# Patient Record
Sex: MALE | Race: WHITE | NOT HISPANIC OR LATINO | Employment: FULL TIME | URBAN - METROPOLITAN AREA
[De-identification: names, ages, dates, MRNs, and addresses within clinical notes are randomized per-mention and may not be internally consistent; named-entity substitution may affect disease eponyms.]

---

## 2019-11-11 PROCEDURE — 99285 EMERGENCY DEPT VISIT HI MDM: CPT

## 2019-11-12 ENCOUNTER — HOSPITAL ENCOUNTER (INPATIENT)
Facility: CLINIC | Age: 39
LOS: 1 days | Discharge: HOME | DRG: 390 | End: 2019-11-13
Attending: EMERGENCY MEDICINE | Admitting: INTERNAL MEDICINE
Payer: COMMERCIAL

## 2019-11-12 ENCOUNTER — APPOINTMENT (OUTPATIENT)
Dept: CT IMAGING | Facility: CLINIC | Age: 39
DRG: 390 | End: 2019-11-12
Attending: EMERGENCY MEDICINE
Payer: COMMERCIAL

## 2019-11-12 DIAGNOSIS — K56.609 SMALL BOWEL OBSTRUCTION (H): ICD-10-CM

## 2019-11-12 LAB
ALBUMIN SERPL-MCNC: 4.1 G/DL (ref 3.4–5)
ALP SERPL-CCNC: 60 U/L (ref 40–150)
ALT SERPL W P-5'-P-CCNC: 63 U/L (ref 0–70)
ANION GAP SERPL CALCULATED.3IONS-SCNC: 2 MMOL/L (ref 3–14)
AST SERPL W P-5'-P-CCNC: 29 U/L (ref 0–45)
BASOPHILS # BLD AUTO: 0.1 10E9/L (ref 0–0.2)
BASOPHILS NFR BLD AUTO: 0.6 %
BILIRUB SERPL-MCNC: 0.4 MG/DL (ref 0.2–1.3)
BUN SERPL-MCNC: 13 MG/DL (ref 7–30)
CALCIUM SERPL-MCNC: 9 MG/DL (ref 8.5–10.1)
CHLORIDE SERPL-SCNC: 105 MMOL/L (ref 94–109)
CO2 SERPL-SCNC: 31 MMOL/L (ref 20–32)
CREAT SERPL-MCNC: 1 MG/DL (ref 0.66–1.25)
DIFFERENTIAL METHOD BLD: ABNORMAL
EOSINOPHIL # BLD AUTO: 0.4 10E9/L (ref 0–0.7)
EOSINOPHIL NFR BLD AUTO: 3.3 %
ERYTHROCYTE [DISTWIDTH] IN BLOOD BY AUTOMATED COUNT: 13.3 % (ref 10–15)
GFR SERPL CREATININE-BSD FRML MDRD: >90 ML/MIN/{1.73_M2}
GLUCOSE SERPL-MCNC: 115 MG/DL (ref 70–99)
HCT VFR BLD AUTO: 43.2 % (ref 40–53)
HGB BLD-MCNC: 15.4 G/DL (ref 13.3–17.7)
IMM GRANULOCYTES # BLD: 0 10E9/L (ref 0–0.4)
IMM GRANULOCYTES NFR BLD: 0.2 %
LIPASE SERPL-CCNC: 135 U/L (ref 73–393)
LYMPHOCYTES # BLD AUTO: 3.2 10E9/L (ref 0.8–5.3)
LYMPHOCYTES NFR BLD AUTO: 26.5 %
MCH RBC QN AUTO: 29.4 PG (ref 26.5–33)
MCHC RBC AUTO-ENTMCNC: 35.6 G/DL (ref 31.5–36.5)
MCV RBC AUTO: 83 FL (ref 78–100)
MONOCYTES # BLD AUTO: 0.7 10E9/L (ref 0–1.3)
MONOCYTES NFR BLD AUTO: 6 %
NEUTROPHILS # BLD AUTO: 7.7 10E9/L (ref 1.6–8.3)
NEUTROPHILS NFR BLD AUTO: 63.4 %
NRBC # BLD AUTO: 0 10*3/UL
NRBC BLD AUTO-RTO: 0 /100
PLATELET # BLD AUTO: 234 10E9/L (ref 150–450)
POTASSIUM SERPL-SCNC: 3.7 MMOL/L (ref 3.4–5.3)
PROT SERPL-MCNC: 7.5 G/DL (ref 6.8–8.8)
RBC # BLD AUTO: 5.23 10E12/L (ref 4.4–5.9)
SODIUM SERPL-SCNC: 138 MMOL/L (ref 133–144)
WBC # BLD AUTO: 12.2 10E9/L (ref 4–11)

## 2019-11-12 PROCEDURE — 25000132 ZZH RX MED GY IP 250 OP 250 PS 637: Performed by: INTERNAL MEDICINE

## 2019-11-12 PROCEDURE — 83690 ASSAY OF LIPASE: CPT | Performed by: EMERGENCY MEDICINE

## 2019-11-12 PROCEDURE — 12000000 ZZH R&B MED SURG/OB

## 2019-11-12 PROCEDURE — 99207 ZZC CDG-MDM COMPONENT: MEETS MODERATE - UP CODED: CPT | Performed by: INTERNAL MEDICINE

## 2019-11-12 PROCEDURE — 25800030 ZZH RX IP 258 OP 636: Performed by: EMERGENCY MEDICINE

## 2019-11-12 PROCEDURE — 80053 COMPREHEN METABOLIC PANEL: CPT | Performed by: EMERGENCY MEDICINE

## 2019-11-12 PROCEDURE — 25000125 ZZHC RX 250: Performed by: EMERGENCY MEDICINE

## 2019-11-12 PROCEDURE — 96376 TX/PRO/DX INJ SAME DRUG ADON: CPT

## 2019-11-12 PROCEDURE — 99223 1ST HOSP IP/OBS HIGH 75: CPT | Mod: AI | Performed by: INTERNAL MEDICINE

## 2019-11-12 PROCEDURE — 25000128 H RX IP 250 OP 636: Performed by: EMERGENCY MEDICINE

## 2019-11-12 PROCEDURE — 25800025 ZZH RX 258: Performed by: INTERNAL MEDICINE

## 2019-11-12 PROCEDURE — 96375 TX/PRO/DX INJ NEW DRUG ADDON: CPT

## 2019-11-12 PROCEDURE — 96374 THER/PROPH/DIAG INJ IV PUSH: CPT | Mod: 59

## 2019-11-12 PROCEDURE — 25000128 H RX IP 250 OP 636: Performed by: INTERNAL MEDICINE

## 2019-11-12 PROCEDURE — 96361 HYDRATE IV INFUSION ADD-ON: CPT

## 2019-11-12 PROCEDURE — 74177 CT ABD & PELVIS W/CONTRAST: CPT

## 2019-11-12 PROCEDURE — 99204 OFFICE O/P NEW MOD 45 MIN: CPT | Performed by: SURGERY

## 2019-11-12 PROCEDURE — 85025 COMPLETE CBC W/AUTO DIFF WBC: CPT | Performed by: EMERGENCY MEDICINE

## 2019-11-12 RX ORDER — DIPHENHYDRAMINE HYDROCHLORIDE 50 MG/ML
25 INJECTION INTRAMUSCULAR; INTRAVENOUS EVERY 6 HOURS PRN
Status: DISCONTINUED | OUTPATIENT
Start: 2019-11-12 | End: 2019-11-13 | Stop reason: HOSPADM

## 2019-11-12 RX ORDER — DIPHENHYDRAMINE HYDROCHLORIDE 50 MG/ML
25 INJECTION INTRAMUSCULAR; INTRAVENOUS ONCE
Status: COMPLETED | OUTPATIENT
Start: 2019-11-12 | End: 2019-11-12

## 2019-11-12 RX ORDER — POTASSIUM CHLORIDE 1.5 G/1.58G
20-40 POWDER, FOR SOLUTION ORAL
Status: DISCONTINUED | OUTPATIENT
Start: 2019-11-12 | End: 2019-11-13 | Stop reason: HOSPADM

## 2019-11-12 RX ORDER — PROCHLORPERAZINE MALEATE 5 MG
10 TABLET ORAL EVERY 6 HOURS PRN
Status: DISCONTINUED | OUTPATIENT
Start: 2019-11-12 | End: 2019-11-13 | Stop reason: HOSPADM

## 2019-11-12 RX ORDER — ONDANSETRON 2 MG/ML
4 INJECTION INTRAMUSCULAR; INTRAVENOUS ONCE
Status: COMPLETED | OUTPATIENT
Start: 2019-11-12 | End: 2019-11-12

## 2019-11-12 RX ORDER — ACETAMINOPHEN 325 MG/1
650 TABLET ORAL EVERY 4 HOURS PRN
Status: DISCONTINUED | OUTPATIENT
Start: 2019-11-12 | End: 2019-11-13 | Stop reason: HOSPADM

## 2019-11-12 RX ORDER — DEXTROSE MONOHYDRATE, SODIUM CHLORIDE, AND POTASSIUM CHLORIDE 50; 1.49; 9 G/1000ML; G/1000ML; G/1000ML
INJECTION, SOLUTION INTRAVENOUS CONTINUOUS
Status: DISCONTINUED | OUTPATIENT
Start: 2019-11-12 | End: 2019-11-13 | Stop reason: HOSPADM

## 2019-11-12 RX ORDER — PROCHLORPERAZINE 25 MG
25 SUPPOSITORY, RECTAL RECTAL EVERY 12 HOURS PRN
Status: DISCONTINUED | OUTPATIENT
Start: 2019-11-12 | End: 2019-11-13 | Stop reason: HOSPADM

## 2019-11-12 RX ORDER — NALOXONE HYDROCHLORIDE 0.4 MG/ML
.1-.4 INJECTION, SOLUTION INTRAMUSCULAR; INTRAVENOUS; SUBCUTANEOUS
Status: DISCONTINUED | OUTPATIENT
Start: 2019-11-12 | End: 2019-11-13 | Stop reason: HOSPADM

## 2019-11-12 RX ORDER — IOPAMIDOL 755 MG/ML
121 INJECTION, SOLUTION INTRAVASCULAR ONCE
Status: COMPLETED | OUTPATIENT
Start: 2019-11-12 | End: 2019-11-12

## 2019-11-12 RX ORDER — HYDROMORPHONE HYDROCHLORIDE 1 MG/ML
0.5 INJECTION, SOLUTION INTRAMUSCULAR; INTRAVENOUS; SUBCUTANEOUS ONCE
Status: COMPLETED | OUTPATIENT
Start: 2019-11-12 | End: 2019-11-12

## 2019-11-12 RX ORDER — MAGNESIUM SULFATE HEPTAHYDRATE 40 MG/ML
4 INJECTION, SOLUTION INTRAVENOUS EVERY 4 HOURS PRN
Status: DISCONTINUED | OUTPATIENT
Start: 2019-11-12 | End: 2019-11-13 | Stop reason: HOSPADM

## 2019-11-12 RX ORDER — SODIUM CHLORIDE 9 MG/ML
1000 INJECTION, SOLUTION INTRAVENOUS CONTINUOUS
Status: DISCONTINUED | OUTPATIENT
Start: 2019-11-12 | End: 2019-11-12

## 2019-11-12 RX ORDER — HYDROXYZINE HYDROCHLORIDE 25 MG/1
25 TABLET, FILM COATED ORAL 3 TIMES DAILY PRN
Status: DISCONTINUED | OUTPATIENT
Start: 2019-11-12 | End: 2019-11-13 | Stop reason: HOSPADM

## 2019-11-12 RX ORDER — AZELASTINE 1 MG/ML
1-2 SPRAY, METERED NASAL 2 TIMES DAILY PRN
COMMUNITY

## 2019-11-12 RX ORDER — HYDROMORPHONE HYDROCHLORIDE 1 MG/ML
.3-.5 INJECTION, SOLUTION INTRAMUSCULAR; INTRAVENOUS; SUBCUTANEOUS
Status: DISCONTINUED | OUTPATIENT
Start: 2019-11-12 | End: 2019-11-13 | Stop reason: HOSPADM

## 2019-11-12 RX ORDER — HYDROMORPHONE HYDROCHLORIDE 1 MG/ML
1 INJECTION, SOLUTION INTRAMUSCULAR; INTRAVENOUS; SUBCUTANEOUS ONCE
Status: COMPLETED | OUTPATIENT
Start: 2019-11-12 | End: 2019-11-12

## 2019-11-12 RX ORDER — ONDANSETRON 4 MG/1
4 TABLET, ORALLY DISINTEGRATING ORAL EVERY 6 HOURS PRN
Status: DISCONTINUED | OUTPATIENT
Start: 2019-11-12 | End: 2019-11-13 | Stop reason: HOSPADM

## 2019-11-12 RX ORDER — ESZOPICLONE 3 MG/1
3 TABLET, FILM COATED ORAL AT BEDTIME
COMMUNITY
End: 2020-02-11

## 2019-11-12 RX ORDER — DIPHENHYDRAMINE HCL 25 MG
25 CAPSULE ORAL EVERY 6 HOURS PRN
Status: DISCONTINUED | OUTPATIENT
Start: 2019-11-12 | End: 2019-11-13 | Stop reason: HOSPADM

## 2019-11-12 RX ORDER — AMOXICILLIN 250 MG
1 CAPSULE ORAL 2 TIMES DAILY
Status: DISCONTINUED | OUTPATIENT
Start: 2019-11-12 | End: 2019-11-13 | Stop reason: HOSPADM

## 2019-11-12 RX ORDER — ALBUTEROL SULFATE 90 UG/1
2 AEROSOL, METERED RESPIRATORY (INHALATION) EVERY 6 HOURS PRN
COMMUNITY

## 2019-11-12 RX ORDER — ONDANSETRON 2 MG/ML
4 INJECTION INTRAMUSCULAR; INTRAVENOUS EVERY 6 HOURS PRN
Status: DISCONTINUED | OUTPATIENT
Start: 2019-11-12 | End: 2019-11-13 | Stop reason: HOSPADM

## 2019-11-12 RX ORDER — POTASSIUM CHLORIDE 29.8 MG/ML
20 INJECTION INTRAVENOUS
Status: DISCONTINUED | OUTPATIENT
Start: 2019-11-12 | End: 2019-11-13 | Stop reason: HOSPADM

## 2019-11-12 RX ORDER — POTASSIUM CHLORIDE 7.45 MG/ML
10 INJECTION INTRAVENOUS
Status: DISCONTINUED | OUTPATIENT
Start: 2019-11-12 | End: 2019-11-13 | Stop reason: HOSPADM

## 2019-11-12 RX ORDER — ESZOPICLONE 3 MG/1
3 TABLET, FILM COATED ORAL
Status: DISCONTINUED | OUTPATIENT
Start: 2019-11-12 | End: 2019-11-13 | Stop reason: HOSPADM

## 2019-11-12 RX ORDER — ROPINIROLE 1 MG/1
1 TABLET, FILM COATED ORAL AT BEDTIME
COMMUNITY

## 2019-11-12 RX ORDER — POTASSIUM CHLORIDE 1500 MG/1
20-40 TABLET, EXTENDED RELEASE ORAL
Status: DISCONTINUED | OUTPATIENT
Start: 2019-11-12 | End: 2019-11-13 | Stop reason: HOSPADM

## 2019-11-12 RX ORDER — POTASSIUM CL/LIDO/0.9 % NACL 10MEQ/0.1L
10 INTRAVENOUS SOLUTION, PIGGYBACK (ML) INTRAVENOUS
Status: DISCONTINUED | OUTPATIENT
Start: 2019-11-12 | End: 2019-11-13 | Stop reason: HOSPADM

## 2019-11-12 RX ORDER — LIDOCAINE 40 MG/G
CREAM TOPICAL
Status: DISCONTINUED | OUTPATIENT
Start: 2019-11-12 | End: 2019-11-13 | Stop reason: HOSPADM

## 2019-11-12 RX ADMIN — HYDROMORPHONE HYDROCHLORIDE 1 MG: 1 INJECTION, SOLUTION INTRAMUSCULAR; INTRAVENOUS; SUBCUTANEOUS at 00:21

## 2019-11-12 RX ADMIN — POTASSIUM CHLORIDE, DEXTROSE MONOHYDRATE AND SODIUM CHLORIDE: 150; 5; 900 INJECTION, SOLUTION INTRAVENOUS at 14:00

## 2019-11-12 RX ADMIN — SENNOSIDES AND DOCUSATE SODIUM 1 TABLET: 8.6; 5 TABLET ORAL at 20:59

## 2019-11-12 RX ADMIN — SENNOSIDES AND DOCUSATE SODIUM 1 TABLET: 8.6; 5 TABLET ORAL at 11:42

## 2019-11-12 RX ADMIN — ONDANSETRON 4 MG: 2 INJECTION INTRAMUSCULAR; INTRAVENOUS at 00:23

## 2019-11-12 RX ADMIN — HYDROMORPHONE HYDROCHLORIDE 0.5 MG: 1 INJECTION, SOLUTION INTRAMUSCULAR; INTRAVENOUS; SUBCUTANEOUS at 10:46

## 2019-11-12 RX ADMIN — SODIUM CHLORIDE 77 ML: 9 INJECTION, SOLUTION INTRAVENOUS at 01:23

## 2019-11-12 RX ADMIN — IOPAMIDOL 121 ML: 755 INJECTION, SOLUTION INTRAVENOUS at 01:23

## 2019-11-12 RX ADMIN — ONDANSETRON 4 MG: 2 INJECTION INTRAMUSCULAR; INTRAVENOUS at 10:52

## 2019-11-12 RX ADMIN — HYDROMORPHONE HYDROCHLORIDE 0.5 MG: 1 INJECTION, SOLUTION INTRAMUSCULAR; INTRAVENOUS; SUBCUTANEOUS at 02:30

## 2019-11-12 RX ADMIN — PROCHLORPERAZINE EDISYLATE 10 MG: 5 INJECTION INTRAMUSCULAR; INTRAVENOUS at 08:10

## 2019-11-12 RX ADMIN — DIPHENHYDRAMINE HYDROCHLORIDE 25 MG: 50 INJECTION, SOLUTION INTRAMUSCULAR; INTRAVENOUS at 01:12

## 2019-11-12 RX ADMIN — HYDROMORPHONE HYDROCHLORIDE 0.5 MG: 1 INJECTION, SOLUTION INTRAMUSCULAR; INTRAVENOUS; SUBCUTANEOUS at 04:35

## 2019-11-12 RX ADMIN — Medication: at 08:02

## 2019-11-12 RX ADMIN — HYDROMORPHONE HYDROCHLORIDE 0.5 MG: 1 INJECTION, SOLUTION INTRAMUSCULAR; INTRAVENOUS; SUBCUTANEOUS at 06:34

## 2019-11-12 RX ADMIN — DIPHENHYDRAMINE HYDROCHLORIDE 25 MG: 50 INJECTION, SOLUTION INTRAMUSCULAR; INTRAVENOUS at 02:31

## 2019-11-12 RX ADMIN — HYDROMORPHONE HYDROCHLORIDE 0.5 MG: 1 INJECTION, SOLUTION INTRAMUSCULAR; INTRAVENOUS; SUBCUTANEOUS at 08:35

## 2019-11-12 RX ADMIN — SODIUM CHLORIDE 1000 ML: 9 INJECTION, SOLUTION INTRAVENOUS at 00:21

## 2019-11-12 RX ADMIN — POTASSIUM CHLORIDE, DEXTROSE MONOHYDRATE AND SODIUM CHLORIDE: 150; 5; 900 INJECTION, SOLUTION INTRAVENOUS at 22:07

## 2019-11-12 RX ADMIN — ONDANSETRON 4 MG: 2 INJECTION INTRAMUSCULAR; INTRAVENOUS at 04:34

## 2019-11-12 RX ADMIN — POTASSIUM CHLORIDE, DEXTROSE MONOHYDRATE AND SODIUM CHLORIDE: 150; 5; 900 INJECTION, SOLUTION INTRAVENOUS at 04:43

## 2019-11-12 RX ADMIN — SODIUM CHLORIDE 1000 ML: 9 INJECTION, SOLUTION INTRAVENOUS at 01:14

## 2019-11-12 ASSESSMENT — ACTIVITIES OF DAILY LIVING (ADL)
RETIRED_COMMUNICATION: 0-->UNDERSTANDS/COMMUNICATES WITHOUT DIFFICULTY
TOILETING: 0-->INDEPENDENT
ADLS_ACUITY_SCORE: 12
COGNITION: 0 - NO COGNITION ISSUES REPORTED
FALL_HISTORY_WITHIN_LAST_SIX_MONTHS: NO
ADLS_ACUITY_SCORE: 12
ADLS_ACUITY_SCORE: 12
TRANSFERRING: 0-->INDEPENDENT
ADLS_ACUITY_SCORE: 12
DRESS: 0-->INDEPENDENT
RETIRED_EATING: 0-->INDEPENDENT
BATHING: 0-->INDEPENDENT
AMBULATION: 0-->INDEPENDENT
SWALLOWING: 0-->SWALLOWS FOODS/LIQUIDS WITHOUT DIFFICULTY
ADLS_ACUITY_SCORE: 13

## 2019-11-12 ASSESSMENT — ENCOUNTER SYMPTOMS: ABDOMINAL PAIN: 1

## 2019-11-12 ASSESSMENT — MIFFLIN-ST. JEOR: SCORE: 2040.76

## 2019-11-12 NOTE — PROGRESS NOTES
Admission    Patient arrives to room 635-1 via cart from ED.  Care plan note: Done    Inpatient nursing criteria listed below were met:    PCD's Documented: Yes  Skin issues/needs documented :Yes  Isolation education started/completed NA  Patient allergies verified with patient: Yes  Verified completion of [place] Risk Assessment Tool:  Yes  Verified completion of Guardianship screening tool: Yes  Fall Prevention: Care plan updated, Education given and documented Yes  Care Plan initiated: Yes  Home medications documented in belongings flowsheet: Yes  Patient belongings documented in belongings flowsheet: Yes  Reminder note (belongings/ medications) placed in discharge instructions:Yes  Admission profile/ required documentation complete: Yes  Bedside Report Letter given and explained to patient Yes

## 2019-11-12 NOTE — PROGRESS NOTES
RECEIVING UNIT ED HANDOFF REVIEW    ED Nurse Handoff Report was reviewed by: Kathryn Cobb on November 12, 2019 at 2:56 AM

## 2019-11-12 NOTE — CONSULTS
General Surgery Consultation    Cash Cunningham MRN#: [id number]   Age: 38 year old YOB: 1980     The surgical service was consulted by Dr. Ferrari to evaluate and/or treat this patient for SBO.    Date of Admission:          11/12/2019       Chief Complaint:     Chief Complaint   Patient presents with     Abdominal Pain          History of Present Illness:   This patient is a 38 year old male who presented to the [facility] ER with lower abdominal pain and bloating after dinner yesterday.  He denies fever, chills, nausea, vomiting, change in BM or urination.  He has had a  hx of SBO since his surgery, last one about 2 years ago.  The patient's abdominal surgeries include SB resection for Meckels.  The history is obtained from the patient and chart. The patient is NPO.     Of note, patient has a rectal fistula, awaiting surgery in January.         Past Medical History:   Depression/Anxiety  ADHD  Planta Fascitis  Hx of renal calculi  Atypical nevus  Dysfxn of pelvic floor  Hx of SBO  Restless leg syndrome  Rectal fistula         Past Surgical History:   Tonsillectomy   Arthroscopic knee operation   SB resection due to Meckels 2010 at [facility]  Liposuction         Medications:     Prior to Admission medications    Medication Sig Start Date End Date Taking? Authorizing Provider   albuterol (PROAIR HFA/PROVENTIL HFA/VENTOLIN HFA) 108 (90 Base) MCG/ACT inhaler Inhale 2 puffs into the lungs every 6 hours as needed for shortness of breath / dyspnea or wheezing   Yes Unknown, Entered By History   azelastine (ASTELIN) 0.1 % nasal spray Spray 1-2 sprays into both nostrils 2 times daily   Yes Unknown, Entered By History   eszopiclone (LUNESTA) 3 MG tablet Take 3 mg by mouth At Bedtime   Yes Unknown, Entered By History   ketotifen (ZADITOR/REFRESH ANTI-ITCH) 0.025 % ophthalmic solution Place 1 drop into both eyes 2 times daily   Yes Unknown, Entered By History   rOPINIRole (REQUIP) 1 MG tablet Take 1 mg  "by mouth At Bedtime   Yes Unknown, Entered By History          Current Medications:           senna-docusate  1 tablet Oral BID     sodium chloride (PF)  3 mL Intracatheter Q8H     acetaminophen, diphenhydrAMINE **OR** diphenhydrAMINE, eucerin, HYDROmorphone, hydrOXYzine, lidocaine 4%, lidocaine (buffered or not buffered), magnesium sulfate, melatonin, naloxone, ondansetron **OR** ondansetron, potassium chloride, potassium chloride with lidocaine, potassium chloride, potassium chloride, potassium chloride, prochlorperazine **OR** prochlorperazine **OR** prochlorperazine, sodium chloride (PF)       Allergies:     Allergies   Allergen Reactions     Cat Hair Extract Itching     eye lids may swell     Fructose GI Disturbance     Lactose GI Disturbance     Stomach cramps; bloating; and gas     Morphine Itching     Trazodone Other (See Comments)     Muscle spasms          Social History:     Social History     Tobacco Use     Smoking status: Not on file   Substance Use Topics     Alcohol use: Not on file          Family History:   Hyperlipidemia   Hypertension   Melanoma   Basal cell carcinoma   Squamous cell carcinoma   Stroke          Review of Systems:   All other systems negative other than noted in the HPI.         Physical Exam:   Blood pressure 131/74, pulse 59, temperature 98  F (36.7  C), temperature source Oral, resp. rate 18, height 1.803 m (5' 11\"), weight 108.9 kg (240 lb), SpO2 97 %.  No intake/output data recorded.  General - This is a well developed, well nourished male in no apparent distress.  HEENT - Normocephalic. Atraumatic. Moist mucous membranes. Pupils equal.  No scleral icterus.  Neck - Supple without masses or lymphadenopathy.  Lungs - Clear to ascultation bilaterally without crackles or wheezing.    Heart - Regular rate & rhythm without murmur.  Abdomen - Soft, mild ttp mid abdomen, non-distended, -bowel sounds, no masses or organomegaly.  Extremities - Moves all extremities. No " edema.  Neurologic - Nonfocal.  Skin - Warm and dry.          Data:   Labs:  Recent Labs   Lab Test 11/12/19 0014   WBC 12.2*   HGB 15.4   HCT 43.2        Recent Labs   Lab Test 11/12/19 0014   POTASSIUM 3.7   CHLORIDE 105   CO2 31   BUN 13   CR 1.00     Recent Labs   Lab Test 11/12/19 0014   BILITOTAL 0.4   ALT 63   AST 29   ALKPHOS 60   LIPASE 135   CT scan of the abdomen: There are a few mildly dilated mid small bowel loops with a transition  at an anastomosis in the upper pelvis at the level of the mid ileum. There is small bowel feces in this segment extending up to the anastomosis. Beyond this the small bowel is decompressed.     Small left inguinal hernia containing fat. Small hiatal hernia. (aware)         Assessment:   SBO         Plan:   - NPO, IVF, bowel rest  - Encourage ambulation and limit narcotics  - Hope for resolution with conservative tx  - Medical management per hospitalist, appreciate your help    Thank you very much for this consult.     I have discussed the history, physical, and plan with Dr. Santiago and who has independently interviewed and examined the patient and agrees with the plan as stated.     Naomie Freed PA-C  [facility]  [phone]

## 2019-11-12 NOTE — PLAN OF CARE
DATE & TIME: 11/12/19 3403-9907                    Cognitive Concerns/ Orientation : A&Ox4  BEHAVIOR & AGGRESSION TOOL COLOR: Green  CIWA SCORE: N/A   ABNL VS/O2: VSS on RA  MOBILITY: SBA  PAIN MANAGMENT: Abdominal pain. PRN Dilaudid given x2 for abdom pain.   DIET: NPO except meds/ice chips   BOWEL/BLADDER: Continent  ABNL LAB/BG: None  DRAIN/DEVICES: PIV infusing at 125 ml/hr; fluids come from pharmacy  TELEMETRY RHYTHM: N/A  SKIN: Intact  TESTS/PROCEDURES: None  D/C DAY/GOALS/PLACE: Pending progress  OTHER IMPORTANT INFO: Surgery consulted today, conservative tx. Pt started on senna one tab twice daily. Zofran and compazine given for intermittent nausea with relief.

## 2019-11-12 NOTE — ED NOTES
"[facility]  ED Nurse Handoff Report    ED Chief complaint: Abdominal Pain      ED Diagnosis:   Final diagnoses:   Small bowel obstruction (H)       Code Status: Full Code    Allergies:   Allergies   Allergen Reactions     Morphine        Activity level - Baseline/Home:  Independent  Activity Level - Current:   Independent    Patient's Preferred language: eng   Needed?: No    Isolation: No  Infection: Not Applicable  Bariatric?: No    Vital Signs:   Vitals:    11/12/19 0050 11/12/19 0100 11/12/19 0145 11/12/19 0215   BP: (!) 141/87 129/68  (!) 148/87   Pulse:  67 76    Resp:       Temp:       TempSrc:       SpO2: 96% 90%  97%   Weight:       Height:           Cardiac Rhythm: ,        Pain level: 0-10 Pain Scale: 2    Is this patient confused?: No   Does this patient have a guardian?  No         If yes, is there guardianship documents in the Epic \"Code/ACP\" activity?  N/A         Guardian Notified?  N/A  [place] - Suicide Severity Rating Scale Completed?  Yes  If yes, what color did the patient score?  White    Patient Report: Initial Complaint: abd pn  Focused Assessment: same  Tests Performed: labs, scan  Abnormal Results:   Labs Ordered and Resulted from Time of ED Arrival Up to the Time of Departure from the ED   CBC WITH PLATELETS DIFFERENTIAL - Abnormal; Notable for the following components:       Result Value    WBC 12.2 (*)     All other components within normal limits   COMPREHENSIVE METABOLIC PANEL - Abnormal; Notable for the following components:    Anion Gap 2 (*)     Glucose 115 (*)     All other components within normal limits   LIPASE   PULSE OXIMETRY NURSING   PERIPHERAL IV CATHETER       Treatments provided: below    Family Comments: na    OBS brochure/video discussed/provided to patient/family: Yes              Name of person given brochure if not patient: .              Relationship to patient: .    ED Medications:   Medications   0.9% sodium chloride BOLUS (0 mLs " Intravenous Stopped 11/12/19 0114)     Followed by   sodium chloride 0.9% infusion (has no administration in time range)   HYDROmorphone (PF) (DILAUDID) injection 1 mg (1 mg Intravenous Given 11/12/19 0021)   ondansetron (ZOFRAN) injection 4 mg (4 mg Intravenous Given 11/12/19 0023)   0.9% sodium chloride BOLUS (1,000 mLs Intravenous New Bag 11/12/19 0114)   diphenhydrAMINE (BENADRYL) injection 25 mg (25 mg Intravenous Given 11/12/19 0112)   iopamidol (ISOVUE-370) solution 121 mL (121 mLs Intravenous Given 11/12/19 0123)   Saline flush (77 mLs Intravenous Given 11/12/19 0123)       Drips infusing?:  Yes    For the majority of the shift this patient was Green.   Interventions performed were .    Severe Sepsis OR Septic Shock Diagnosis Present: No    To be done/followed up on inpatient unit:  na    ED NURSE PHONE NUMBER: [id number]

## 2019-11-12 NOTE — PLAN OF CARE
DATE & TIME: 11/12/19 2078-1503    Cognitive Concerns/ Orientation : A & O x4   BEHAVIOR & AGGRESSION TOOL COLOR: Green  CIWA SCORE: N/A   ABNL VS/O2: VSS on RA  MOBILITY: SBA; drowsiness and dizziness reported by pt, bed alarm on overnight.   PAIN MANAGMENT: Abdominal pain. PRN Dilaudid given x1  DIET: NPO  BOWEL/BLADDER: Continent  ABNL LAB/BG: None  DRAIN/DEVICES: PIV infusing at 100 ml/hr; fluids come from pharmacy  TELEMETRY RHYTHM: N/A  SKIN: Intact  TESTS/PROCEDURES: None  D/C DAY/GOALS/PLACE: Pending progress  OTHER IMPORTANT INFO: Surgery consult in place.

## 2019-11-12 NOTE — ED PROVIDER NOTES
History     Chief Complaint:  Abdominal pain     HPI  Cash Cunningham is a 38 year old male with a history of small bowell obstructions (per the patient) who presents with abdominal pain. The patient just moved here from [place], [place] one year ago and last had a small bowell obstruction roughly 3 years ago. After dinner tonight, the patient noticed abdominal pain just inferior to his umbilicus which he states reminds him of small bowell obstructions he has had in the past. Here, he states the pain doesn't reside and he is stuck in one position unable to moved due to discomfort. He reports feeling fine all day until this. Of note: the patient reports abdominal surgery in 2010.     Allergies:  Morphine  Trazodone     Medications:    Lunesta   Requip   Senokot   Naprosyn   Flonase   Astelin   Ventolin   Cymbalta     Past Medical History:    Depression   ADHD  Fasciitis plantar   Dysfunction pelvic floor  Kidney stone stone   Nevus atypical     Past Surgical History:    Tonsillectomy   Arthroscopic knee operation   Small intestine excision   Tonsillectomy   Colon surgery   Bariatric surgery     Family History:    Hyperlipidemia   Hypertension   Melanoma   Basal cell carcinoma   Squamous cell carcinoma   Stroke     Social History:  Marital Status:  Unknown   Smoker:   Former   Smokeless:   Never   Alcohol:   No   Drugs:   Unknown     Review of Systems   Gastrointestinal: Positive for abdominal pain.   All other systems reviewed and are negative.    Physical Exam     Patient Vitals for the past 24 hrs:   BP Temp Temp src Pulse Resp SpO2 Height Weight   11/12/19 0215 (!) 148/87 -- -- -- -- 97 % -- --   11/12/19 0145 -- -- -- 76 -- -- -- --   11/12/19 0100 129/68 -- -- 67 -- 90 % -- --   11/12/19 0050 (!) 141/87 -- -- -- -- 96 % -- --   11/12/19 0045 -- -- -- 84 -- 95 % -- --   11/12/19 0040 -- -- -- -- -- 92 % -- --   11/12/19 0030 138/85 -- -- 71 -- -- -- --   11/12/19 0020 (!) 149/90 -- -- -- -- 98 % -- --  "  11/12/19 0015 -- -- -- -- -- 98 % -- --   11/12/19 0010 (!) 154/122 -- -- -- -- 99 % -- --   11/12/19 0004 (!) 154/122 98.2  F (36.8  C) Oral 91 18 98 % 1.803 m (5' 11\") 108.9 kg (240 lb)     Physical Exam  Eye:  Pupils are equal, round, and reactive.  Extraocular movements intact.    ENT:  No rhinorrhea.  Moist mucus membranes.  Normal tongue and tonsil.    Cardiac:  Regular rate and rhythm.  No murmurs, gallops, or rubs.    Pulmonary:  Clear to auscultation bilaterally.  No wheezes, rales, or rhonchi.    Abdomen:  Generalized diffuse tenderness to palpation without rebound or guarding.     Musculoskeletal:  Normal movement of all extremities without evidence for deficit.    Skin:  Warm and dry without rashes.    Neurologic:  Non-focal exam without asymmetric weakness or numbness.     Psychiatric:  Normal affect with appropriate interaction with examiner.    Emergency Department Course   Imaging:  Radiographic findings were communicated with the patient who voiced understanding of the findings.    CT Abdomen/Pelvis with IV contrast:   1. Mild mechanical small bowel obstruction apparently related to an  anastomosis at the level of the mid ileum.  2. No other acute findings. as per radiology.    Laboratory:  CBC: WBC: 12.2, HGB: 15.4, PLT: 234  CMP: Glucose 115, anion gap 2, o/w WNL (Creatinine: 1.00)  Lipase: 135    Interventions:  0114: NS 1L IV Bolus   0021: NS 1L IV Bolus   0021: Dilaudid 1 mg IV  0023: Zofran 4 mg IV  0112: Benadryl 25 mg IV  0231: Benadryl 25 mg IV    Emergency Department Course:  0010 I performed an exam of the patient as documented above.   0145 I rechecked the patient and discussed the results of their workup thus far.   0231 I consulted with Dr. Ferrari of the hospitalist services. They are in agreement to accept the patient for admission.    Findings and plan explained to the Patient who consents to admission. Discussed the patient with Dr. Ferrari, who will admit the patient to a medical bed " for further monitoring, evaluation, and treatment.    Impression & Plan    Medical Decision Making:  Cash Cunningham is a 38 year old male who presents for concerns of having diffuse abdominal pain that is very similar to his prior small bowel obstructions.  This is confirmed on a CT.  The rest of his labs are reassuring and he feels improved after the above interventions.  The patient will be admitted under inpatient status to Dr. Ferrari.  There is no indication for an NG tube or antibiotics at this time.    Diagnosis:    ICD-10-CM    1. Small bowel obstruction (H) K56.609      Disposition:  Admitted to medical bed.  Scribe Disclosure: Samir DEL ROSARIO, am serving as a scribe on 11/12/2019 at 12:10 AM to personally document services performed by Trierweiler, Chad A, MD based on my observations and the provider's statements to me.      Trierweiler, Chad A, MD  11/12/19 5249

## 2019-11-12 NOTE — H&P
[facility]    History and Physical - Hospitalist Service       Date of Admission:  11/12/2019    Assessment & Plan   Cash Cunningham is a 38 year old male admitted on 11/12/2019. He presents with abdominal pain and bloating consistent with prior episodes of small bowel obstruction and is found to have CT findings consistent with small bowel obstruction at level of anastomosis from prior Meckel's diverticulum resection.    Anastomotic small bowel obstruction: Patient with a history of recurrent small bowel obstructions at site of anastomosis from prior Meckel's diverticulum resection  -N.p.o. for bowel rest  -General surgery consulted  -Dilaudid available PRN for pain control.  Minimize narcotics as able as patient has significant itching with both synthetic opiates and morphine.  -Benadryl, hydroxyzine available PRN for itching  -Eucerin as an emollient for itching  -Not currently with NG in place, though if patient develops nausea or vomiting, low threshold for placement.    Restless leg syndrome:  -When patient is able to advance diet and tolerate oral medications, resume prior to admission Requip.    Anxiety with depression:  -Prior to admission Cymbalta currently on hold given n.p.o. status       Diet: NPO for Medical/Clinical Reasons Except for: Meds    DVT Prophylaxis: Pneumatic Compression Devices  [place] Catheter: not present  Code Status: Full code.  Confirmed with patient on admission    Disposition Plan   Expected discharge: 2 - 3 days, recommended to prior living arrangement once Bowel function has returned and tolerating oral intake.  Entered: Miguel Ferrari MD 11/12/2019, 2:49 AM     The patient's care was discussed with the Patient and Dr. Trierweiler in the emergency department.    Miguel Ferrari MD  [facility]    ______________________________________________________________________    Chief Complaint   Abdominal pain    History is obtained from patient, chart  review, review of outside records including historical MR enterography through [facility] system, discussion with Dr. Trierweiler in the emergency department.    History of Present Illness   Cash Cunningham is a 38 year old male who presents to the emergency department with abdominal discomfort consistent with his prior small bowel obstructions.  Patient underwent Meckel's diverticulum resection with reanastomosis and mid bowel approximately 2010 through [facility] in [place].  Since that time, patient has essentially had somewhat regular episodes of small bowel obstruction from 2011 through 2017 where he will get typical abdominal discomfort and bloating sensation.  Episodes will usually be limited to 24 to 48 hours prior to resolving, and he has not been hospitalized or evaluated for all of these episodes.  Since 2017, he has not had significant symptoms or episodes up until tonight.  Note that patient also has a history of pelvic floor dysfunction and negative MR enterography in July 2017 following a hospitalization for small bowel obstruction at that time.  Patient reports that he typically does not have significant nausea or vomiting with these bowel obstruction episodes, and his primary complaint is abdominal discomfort.  He reports he has never required an NG in the past during bowel obstructions.    Reports that he ate dinner this evening, and after dinner, noted typical pain in his lower abdomen.  With this discomfort, he felt the need to defecate, and had a small bowel movement without relief of his symptoms.  Pain was associated with typical bloating, and patient presented to the emergency department for evaluation.  A CT was performed which demonstrate findings consistent with a small bowel obstruction at the mid small bowel anastomosis, consistent with prior imaging.  Patient received IV Dilaudid in the emergency department for pain control, and subsequent he had itching.  Patient has known itching  associated with morphine in the past, has never found anything to control this historically.  No rash, no anaphylaxis to narcotics.  Did receive Benadryl as well in the emergency department.    Admitted for pain control, IV fluids.  Consulted general surgery.  Patient has not established care with a surgical service since moving from [place] approximately 2 years ago.    Review of Systems    The 10 point Review of Systems is negative other than noted in the HPI or here.  Patient has a perianal fistula for which he has an appointment with colorectal surgery  No fevers or chills  No nausea or vomiting.    Past Medical History    I have reviewed this patient's medical history and updated it with pertinent information if needed.   Anxiety and depression  Meckel's diverticulum  ADHD  Pelvic floor dysfunction  History of renal stone  Plantar fasciitis    Past Surgical History   I have reviewed this patient's surgical history and updated it with pertinent information if needed.  Meckel's diverticulum resection in 2006  Tonsillectomy    Social History   I have reviewed this patient's social history and updated it with pertinent information if needed.  Social History     Tobacco Use     Smoking status: Nonsmoke   Substance Use Topics     Alcohol use: No     Drug use: No       Family History   I have reviewed this patient's family history and updated it with pertinent information if needed.   Father with hypertension, hyperlipidemia, superficial melanoma  Maternal grandmother with lung cancer and stroke  Mother with anxiety and depression, migraine headaches  Sister with migraine headaches    Prior to Admission Medications   Requip, Lunesta, Cymbalta     Allergies   Allergies   Allergen Reactions     Morphine        Physical Exam   Vital Signs: Temp: 98.2  F (36.8  C) Temp src: Oral BP: (!) 148/87 Pulse: 76   Resp: 18 SpO2: 97 % O2 Device: None (Room air)    Weight: 240 lbs 0 oz    General Appearance: Well-appearing obese  38-year-old male  Eyes: No scleral icterus or injection  HEENT: Normocephalic  Respiratory: Breath sounds are clear bilaterally with no wheezes or crackles.  Cardiovascular: Regular rate and, no murmur  GI: No palpable mass.  Abdomen is obese, slightly distended/bloated.  Bowel sounds are absent.  Patient is most tender in the right side of his abdomen, slightly more lower.  Mild tenderness elsewhere on palpation  Lymph/Hematologic: No lower extremity edema  Genitourinary: Not examined  Skin: No rashes.  Patient is itching at his arms and shoulders following Dilaudid administration in the emergency department, no lesions associated with this.  Musculoskeletal: muscular tone intact in all extremities  Neurologic: Alert, conversant.  Mental status grossly intact.  Psychiatric: Normal affect, very pleasant    Data   Data reviewed today: I reviewed all medications, new labs and imaging results over the last 24 hours. I personally reviewed the abdominal CT image(s) showing Proximal small bowel dilation.    Recent Labs   Lab 11/12/19  0014   WBC 12.2*   HGB 15.4   MCV 83         POTASSIUM 3.7   CHLORIDE 105   CO2 31   BUN 13   CR 1.00   ANIONGAP 2*   ANGELLA 9.0   *   ALBUMIN 4.1   PROTTOTAL 7.5   BILITOTAL 0.4   ALKPHOS 60   ALT 63   AST 29   LIPASE 135

## 2019-11-12 NOTE — PROGRESS NOTES
Update Note    Patient seen and examined.  Overall he is feeling improved.  Abdominal pain and N/V are already improving.  No fevers or chills    Plan:   - Awaiting surgery evaluation.  Will likely start diet once cleared with them  - Started scheduled stool softener/bowel stimulant   - Increased IVF rate     Adriel Sewell DO   Hospitalist

## 2019-11-12 NOTE — PHARMACY-ADMISSION MEDICATION HISTORY
Pharmacy Medication History  Admission medication history interview status for the 11/12/2019  admission is complete. See EPIC admission navigator for prior to admission medications     Medication history sources: Patient and Pharmacy (Radha)  Medication history source reliability: Good  Adherence assessment: Good    Significant changes made to the medication list:  none    Additional medication history information:   Patient reports using albuterol inhaler prn, per pharmacy records was picked up in 01/2019.    Medication reconciliation completed by provider prior to medication history? No    Time spent in this activity: 15 min       Prior to Admission medications    Medication Sig Last Dose Taking? Auth Provider   albuterol (PROAIR HFA/PROVENTIL HFA/VENTOLIN HFA) 108 (90 Base) MCG/ACT inhaler Inhale 2 puffs into the lungs every 6 hours as needed for shortness of breath / dyspnea or wheezing  at prn Yes Unknown, Entered By History   azelastine (ASTELIN) 0.1 % nasal spray Spray 1-2 sprays into both nostrils 2 times daily 11/11/2019 at prn Yes Unknown, Entered By History   eszopiclone (LUNESTA) 3 MG tablet Take 3 mg by mouth At Bedtime 11/11/2019 at Unknown time Yes Unknown, Entered By History   ketotifen (ZADITOR/REFRESH ANTI-ITCH) 0.025 % ophthalmic solution Place 1 drop into both eyes 2 times daily 11/11/2019 at Unknown time Yes Unknown, Entered By History   rOPINIRole (REQUIP) 1 MG tablet Take 1 mg by mouth At Bedtime 11/11/2019 at Unknown time Yes Unknown, Entered By History

## 2019-11-12 NOTE — ED TRIAGE NOTES
Patient states hx of intestinal blockages.  Has had surgery in 2010 to correct & had blockage since then also.  Pain started tonight.

## 2019-11-13 VITALS
TEMPERATURE: 98.1 F | WEIGHT: 240 LBS | BODY MASS INDEX: 33.6 KG/M2 | HEART RATE: 76 BPM | RESPIRATION RATE: 17 BRPM | DIASTOLIC BLOOD PRESSURE: 77 MMHG | OXYGEN SATURATION: 93 % | HEIGHT: 71 IN | SYSTOLIC BLOOD PRESSURE: 131 MMHG

## 2019-11-13 LAB
ALBUMIN SERPL-MCNC: 3.7 G/DL (ref 3.4–5)
ALP SERPL-CCNC: 61 U/L (ref 40–150)
ALT SERPL W P-5'-P-CCNC: 51 U/L (ref 0–70)
ANION GAP SERPL CALCULATED.3IONS-SCNC: 3 MMOL/L (ref 3–14)
AST SERPL W P-5'-P-CCNC: 23 U/L (ref 0–45)
BILIRUB SERPL-MCNC: 0.6 MG/DL (ref 0.2–1.3)
BUN SERPL-MCNC: 7 MG/DL (ref 7–30)
CALCIUM SERPL-MCNC: 8.4 MG/DL (ref 8.5–10.1)
CHLORIDE SERPL-SCNC: 109 MMOL/L (ref 94–109)
CO2 SERPL-SCNC: 26 MMOL/L (ref 20–32)
CREAT SERPL-MCNC: 0.91 MG/DL (ref 0.66–1.25)
GFR SERPL CREATININE-BSD FRML MDRD: >90 ML/MIN/{1.73_M2}
GLUCOSE SERPL-MCNC: 152 MG/DL (ref 70–99)
POTASSIUM SERPL-SCNC: 3.8 MMOL/L (ref 3.4–5.3)
PROT SERPL-MCNC: 6.8 G/DL (ref 6.8–8.8)
SODIUM SERPL-SCNC: 138 MMOL/L (ref 133–144)

## 2019-11-13 PROCEDURE — 36415 COLL VENOUS BLD VENIPUNCTURE: CPT | Performed by: INTERNAL MEDICINE

## 2019-11-13 PROCEDURE — 99231 SBSQ HOSP IP/OBS SF/LOW 25: CPT | Performed by: PHYSICIAN ASSISTANT

## 2019-11-13 PROCEDURE — 25000132 ZZH RX MED GY IP 250 OP 250 PS 637: Performed by: INTERNAL MEDICINE

## 2019-11-13 PROCEDURE — G0378 HOSPITAL OBSERVATION PER HR: HCPCS

## 2019-11-13 PROCEDURE — 80053 COMPREHEN METABOLIC PANEL: CPT | Performed by: INTERNAL MEDICINE

## 2019-11-13 PROCEDURE — 99217 ZZC OBSERVATION CARE DISCHARGE: CPT | Performed by: INTERNAL MEDICINE

## 2019-11-13 PROCEDURE — 25800025 ZZH RX 258: Performed by: INTERNAL MEDICINE

## 2019-11-13 RX ORDER — ONDANSETRON 4 MG/1
4 TABLET, FILM COATED ORAL EVERY 8 HOURS PRN
Qty: 30 TABLET | Refills: 0 | Status: SHIPPED | OUTPATIENT
Start: 2019-11-13 | End: 2020-02-11

## 2019-11-13 RX ORDER — ROPINIROLE 1 MG/1
1 TABLET, FILM COATED ORAL AT BEDTIME
Status: DISCONTINUED | OUTPATIENT
Start: 2019-11-13 | End: 2019-11-13 | Stop reason: HOSPADM

## 2019-11-13 RX ADMIN — POTASSIUM CHLORIDE, DEXTROSE MONOHYDRATE AND SODIUM CHLORIDE: 150; 5; 900 INJECTION, SOLUTION INTRAVENOUS at 06:02

## 2019-11-13 RX ADMIN — SENNOSIDES AND DOCUSATE SODIUM 1 TABLET: 8.6; 5 TABLET ORAL at 08:05

## 2019-11-13 ASSESSMENT — ACTIVITIES OF DAILY LIVING (ADL)
ADLS_ACUITY_SCORE: 12

## 2019-11-13 NOTE — PROGRESS NOTES
"General Surgery Progress Note    Assessment and Plan:  38 year old male with SBO, resolving  - Low fiber diet  - Small, frequent meals  - Care coordinator consult to establish PCP  - Home when ok with medicine, appreciate your help  - Call office if surgical needs arise in OP setting     Interval Hx:   Denies abdominal pain, n/v, tolerating diet, +Flatus/BM, UO adequate, ambulating.  Meds reviewed.  Discussed surgery in OP setting when he chooses for SB, left inguinal hernia. Discussed elective vs incarceration vs strangulation of inguinal hernia. Also discussed small hiatal hernia.     Exam:  Vitals: Blood pressure 130/77, pulse 69, temperature 97.5  F (36.4  C), temperature source Oral, resp. rate 18, height 1.803 m (5' 11\"), weight 108.9 kg (240 lb), SpO2 97 %.  Temperature Temp  Av.7  F (36.5  C)  Min: 97.5  F (36.4  C)  Max: 97.8  F (36.6  C)   I/O last 3 completed shifts:  In: 3368 [P.O.:560; I.V.:2808]  Out: -     Gen: Alert, awake and oriented  Abd: soft, nt, nd, +BS.     Data:    Recent Labs   Lab Test 19  0935 19  0014    138   POTASSIUM 3.8 3.7   CHLORIDE 109 105   CO2 26 31   BUN 7 13   CR 0.91 1.00     Naomie Freed PA-C  [facility]  [phone]  "

## 2019-11-13 NOTE — PLAN OF CARE
DATE & TIME: 11/12/19 pola                  Cognitive Concerns/ Orientation : A&Ox4  BEHAVIOR & AGGRESSION TOOL COLOR: Green, calm/coop  ABNL VS/O2: VSS on RA  MOBILITY: SBA  PAIN MANAGMENT: denies this shift  DIET: Cl liq, good po  BOWEL/BLADDER: Continent  ABNL LAB/BG: WBC-12.2  DRAIN/DEVICES: PIV infusing at 125 ml/hr  TELEMETRY RHYTHM: N/A  SKIN: Intact  TESTS/PROCEDURES: None  D/C DAY/GOALS/PLACE: Pending progress  OTHER IMPORTANT INFO: Surgery consulted today, conservative tx for now, IVF infusing, BS present/active, pt reports 2 BMs today, tolerated Cl lig well, no N/V, Surgery will f/u.

## 2019-11-13 NOTE — PLAN OF CARE
DATE & TIME: 11/13/19 3781-4611    Cognitive Concerns/ Orientation : A&O x4   BEHAVIOR & AGGRESSION TOOL COLOR: Green  CIWA SCORE: N/A   ABNL VS/O2: VSS on RA  MOBILITY: Independent  PAIN MANAGMENT: Denies pain this shift  DIET: Clear liquids  BOWEL/BLADDER: Continent  ABNL LAB/BG: None  DRAIN/DEVICES: PIV infusing; fluids come from pharmacy   TELEMETRY RHYTHM: N/A  SKIN: Intact  TESTS/PROCEDURES: None  D/C DAY/GOALS/PLACE: Pending progress, possibly today  OTHER IMPORTANT INFO: None

## 2019-11-13 NOTE — PLAN OF CARE
DATE & TIME: 11/13/19 7-3pm  Cognitive Concerns/ Orientation : A&O x4   BEHAVIOR & AGGRESSION TOOL COLOR: Green  CIWA SCORE: N/A   ABNL VS/O2: VSS on RA  MOBILITY: Independent  PAIN MANAGMENT: Denies pain this shift  DIET: Regular  BOWEL/BLADDER: Continent  ABNL LAB/BG: None  DRAIN/DEVICES: S.L.  TELEMETRY RHYTHM: N/A  SKIN: Intact  TESTS/PROCEDURES: None  D/C DAY/GOALS/PLACE: Pending progress, possibly today  OTHER IMPORTANT INFO: Patient tolerated full liquids, advanced to regular & tolerating.  Waiting for surgery to see for possible discharge.  Vss, on RA.  Denies pain

## 2019-11-13 NOTE — PROGRESS NOTES
MD Notification    Notified Person: MD    Notified Person Name:Dr Ferrari    Notification Date/Time:2230    Notification Interaction:paged    Purpose of Notification:Pt is no longer NPO, would like to take Lunesta at hs  Orders Received:awaitng for call back    Comments:order for Lunesta received

## 2019-11-13 NOTE — PROGRESS NOTES
Discharge    Patient discharged to home with wife.  Care plan note: patient alert/orient X4, up independently.  Tolerating regular diet, no complaints of pain.  Up ambulating hallways.  Discharging to home today.    Listed belongings gathered and returned to patient. Yes  Care Plan and Patient education resolved: Yes  Prescriptions if needed, hard copies sent with patient  Yes  Home and hospital acquired medications returned to patient: NA  Medication Bin checked and emptied on discharge Yes  Follow up appointment made for patient: No, patient to make appointment.

## 2019-11-13 NOTE — UTILIZATION REVIEW
"    Admission Status; Secondary Review Determination         Under the authority of the Utilization Management Committee, the utilization review process indicated a secondary review on the above patient.  The review outcome is based on review of the medical records, discussions with staff, and applying clinical experience noted on the date of the review.        ()      Inpatient Status Appropriate - This patient's medical care is consistent with medical management for inpatient care and reasonable inpatient medical practice.      (x) Observation Status Appropriate - This patient does not meet hospital inpatient criteria and is placed in observation status. If this patient's primary payer is Medicare and was admitted as an inpatient, Condition Code 44 should be used and patient status changed to \"observation\".   () Admission Status NOT Appropriate - This patient's medical care is not consistent with medical management for Inpatient or Observation Status.          RATIONALE FOR DETERMINATION     \"Cash Cunningham is a 38 year old male admitted on 11/12/2019. He presents with abdominal pain and bloating consistent with prior episodes of small bowel obstruction and is found to have CT findings consistent with small bowel obstruction at level of anastomosis from prior Meckel's diverticulum resection.\"    His SBO has resolved with conservative management and his diet has been advanced. I advise observation status as he may go home soon.    The severity of illness, intensity of service provided, expected LOS make it appropriate for hospital observation.        The information on this document is developed by the utilization review team in order for the business office to ensure compliance.  This only denotes the appropriateness of proper admission status and does not reflect the quality of care rendered.         The definitions of Inpatient Status and Observation Status used in making the determination above are those " provided in the CMS Coverage Manual, Chapter 1 and Chapter 6, section 70.4.      Sincerely,     MARLENE GARCIA MD    Physician Advisor  Utilization Review/ Case Management  [facility].

## 2019-11-13 NOTE — PROVIDER NOTIFICATION
Brief update:    Pt no longer NPO, requests home lunesta    Ordered    Miguel Ferrari MD  11:16 PM

## 2019-11-13 NOTE — DISCHARGE SUMMARY
[facility]  Hospitalist Discharge Summary       Date of Admission:  11/12/2019  Date of Discharge:  11/13/2019  4:03 PM  Discharging Provider: Adriel Sewell DO      Discharge Diagnoses   1. Anastomotic small bowel obstruction   2. H/o RLS  3. Anxiety with depression     Follow-ups Needed After Discharge   Follow-up Appointments     Follow-up and recommended labs and tests       Follow up with Dr. Santiago/[facility] as needed ( #   [phone]). We are located at [address] [place] [address].         Follow-up and recommended labs and tests       Follow up with primary care provider, [facility], within 2-4 weeks,   for hospital follow- up. No follow up labs or test are needed.  Follow up with general surgery as planned             Unresulted Labs Ordered in the Past 30 Days of this Admission     No orders found for last 31 day(s).        Discharge Disposition   Discharged to home  Condition at discharge: Stable    Hospital Course   Treated medically.  Seen by surgery with no surgical recommendations.  Had improvement of his symptoms and was able to advance to low fiber diet without difficulty.        Consultations This Hospital Stay   SURGERY GENERAL IP CONSULT  CARE COORDINATOR IP CONSULT    Code Status   Full Code    Time Spent on this Encounter   I, Adriel Sewell DO, personally saw the patient today and spent less than or equal to 30 minutes discharging this patient.       Adriel Sewell DO  [facility]  ______________________________________________________________________    Physical Exam   Vital Signs: Temp: 98.1  F (36.7  C) Temp src: Oral BP: 131/77 Pulse: 76   Resp: 17 SpO2: 93 % O2 Device: None (Room air)    Weight: 240 lbs 0 oz  General Appearance: Resting comfortably.  NAD  Respiratory: Clear to auscultation.  No respiratory distress  Cardiovascular: RRR.  No murmurs  GI: Bowel sounds present.  Non-tender  Skin: No  rashes.  No cyanosis  Other: No edema.  No calf tenderness         Primary Care Physician   [facility]    Discharge Orders      Follow-up and recommended labs and tests     Follow up with Dr. Santiago/[facility] as needed ( # [phone]). We are located at [address] [place] [address].     Reason for your hospital stay    Small bowel obstruction     Follow-up and recommended labs and tests     Follow up with primary care provider, [facility], within 2-4 weeks, for hospital follow- up. No follow up labs or test are needed.  Follow up with general surgery as planned     Activity    Your activity upon discharge: activity as tolerated     Diet    Follow this diet upon discharge: Orders Placed This Encounter      Regular Diet Adult       Significant Results and Procedures   Most Recent 3 CBC's:  Recent Labs   Lab Test 11/12/19  0014   WBC 12.2*   HGB 15.4   MCV 83        Most Recent 3 BMP's:  Recent Labs   Lab Test 11/13/19  0935 11/12/19  0014    138   POTASSIUM 3.8 3.7   CHLORIDE 109 105   CO2 26 31   BUN 7 13   CR 0.91 1.00   ANIONGAP 3 2*   ANGELLA 8.4* 9.0   * 115*   ,   Results for orders placed or performed during the hospital encounter of 11/12/19   CT Abdomen Pelvis w Contrast    Narrative    CT ABDOMEN PELVIS W CONTRAST  11/12/2019 1:24 AM     HISTORY: Lower abdominal pain - history of small bowel obstruction.    TECHNIQUE: Volumetric acquisition through abdomen and pelvis with IV  contrast. 121 mL Isovue-370. Radiation dose for this scan was reduced  using automated exposure control, adjustment of the mA and/or kV  according to patient size, or iterative reconstruction technique.    COMPARISON: None.    FINDINGS: Possible mild fatty infiltration of the liver. The liver,  gallbladder, spleen, pancreas, adrenal glands and kidneys demonstrate  no worrisome findings.    Visualized lung bases are clear. Small esophageal hiatal hernia. There  are a few small  nonspecific lymph nodes in the lower mediastinum  adjacent to the hiatal hernia. For example, one of these demonstrates  a short axis dimension of 0.8 cm.    There are a few mildly dilated mid small bowel loops with a transition  at an anastomosis in the upper pelvis at the level of the mid ileum.  There is small bowel feces in this segment extending up to the  anastomosis. Beyond this the small bowel is decompressed.    Small left inguinal hernia containing fat. Negative appendix. Pelvic  structures otherwise negative.      Impression    IMPRESSION:  1. Mild mechanical small bowel obstruction apparently related to an  anastomosis at the level of the mid ileum.    2. No other acute findings.    CHRISS AWAD MD       Discharge Medications   Current Discharge Medication List      START taking these medications    Details   ondansetron (ZOFRAN) 4 MG tablet Take 1 tablet (4 mg) by mouth every 8 hours as needed for nausea  Qty: 30 tablet, Refills: 0    Comments: Future refills by PCP Dr. Darren Baugh with phone number [phone].  Associated Diagnoses: Small bowel obstruction (H)         CONTINUE these medications which have NOT CHANGED    Details   albuterol (PROAIR HFA/PROVENTIL HFA/VENTOLIN HFA) 108 (90 Base) MCG/ACT inhaler Inhale 2 puffs into the lungs every 6 hours as needed for shortness of breath / dyspnea or wheezing    Comments: Pharmacy may dispense brand covered by insurance (Proair, or proventil or ventolin or generic albuterol inhaler)      azelastine (ASTELIN) 0.1 % nasal spray Spray 1-2 sprays into both nostrils 2 times daily      eszopiclone (LUNESTA) 3 MG tablet Take 3 mg by mouth At Bedtime      ketotifen (ZADITOR/REFRESH ANTI-ITCH) 0.025 % ophthalmic solution Place 1 drop into both eyes 2 times daily      rOPINIRole (REQUIP) 1 MG tablet Take 1 mg by mouth At Bedtime           Allergies   Allergies   Allergen Reactions     Cat Hair Extract Itching     eye lids may swell     Fructose GI  Disturbance     Lactose GI Disturbance     Stomach cramps; bloating; and gas     Morphine Itching     Trazodone Other (See Comments)     Muscle spasms

## 2019-12-18 ENCOUNTER — HOSPITAL ENCOUNTER (EMERGENCY)
Facility: CLINIC | Age: 39
Discharge: HOME | End: 2019-12-18
Attending: EMERGENCY MEDICINE | Admitting: EMERGENCY MEDICINE
Payer: COMMERCIAL

## 2019-12-18 ENCOUNTER — APPOINTMENT (OUTPATIENT)
Dept: CT IMAGING | Facility: CLINIC | Age: 39
End: 2019-12-18
Attending: EMERGENCY MEDICINE
Payer: COMMERCIAL

## 2019-12-18 ENCOUNTER — NURSE TRIAGE (OUTPATIENT)
Dept: NURSING | Facility: CLINIC | Age: 39
End: 2019-12-18

## 2019-12-18 VITALS
TEMPERATURE: 98.1 F | HEIGHT: 71 IN | HEART RATE: 74 BPM | OXYGEN SATURATION: 99 % | BODY MASS INDEX: 33.88 KG/M2 | DIASTOLIC BLOOD PRESSURE: 75 MMHG | RESPIRATION RATE: 18 BRPM | SYSTOLIC BLOOD PRESSURE: 112 MMHG | WEIGHT: 242 LBS

## 2019-12-18 DIAGNOSIS — K60.30 ANAL FISTULA: ICD-10-CM

## 2019-12-18 DIAGNOSIS — K21.00 GASTROESOPHAGEAL REFLUX DISEASE WITH ESOPHAGITIS: ICD-10-CM

## 2019-12-18 DIAGNOSIS — K61.0 PERIANAL ABSCESS: ICD-10-CM

## 2019-12-18 LAB
ALBUMIN UR-MCNC: NEGATIVE MG/DL
ANION GAP SERPL CALCULATED.3IONS-SCNC: 6 MMOL/L (ref 3–14)
APPEARANCE UR: CLEAR
BASOPHILS # BLD AUTO: 0.1 10E9/L (ref 0–0.2)
BASOPHILS NFR BLD AUTO: 0.6 %
BILIRUB UR QL STRIP: NEGATIVE
BUN SERPL-MCNC: 16 MG/DL (ref 7–30)
CALCIUM SERPL-MCNC: 8.4 MG/DL (ref 8.5–10.1)
CHLORIDE SERPL-SCNC: 111 MMOL/L (ref 94–109)
CO2 SERPL-SCNC: 25 MMOL/L (ref 20–32)
COLOR UR AUTO: YELLOW
CREAT SERPL-MCNC: 0.85 MG/DL (ref 0.66–1.25)
DIFFERENTIAL METHOD BLD: ABNORMAL
EOSINOPHIL # BLD AUTO: 0.3 10E9/L (ref 0–0.7)
EOSINOPHIL NFR BLD AUTO: 3.4 %
ERYTHROCYTE [DISTWIDTH] IN BLOOD BY AUTOMATED COUNT: 13.4 % (ref 10–15)
GFR SERPL CREATININE-BSD FRML MDRD: >90 ML/MIN/{1.73_M2}
GLUCOSE SERPL-MCNC: 89 MG/DL (ref 70–99)
GLUCOSE UR STRIP-MCNC: NEGATIVE MG/DL
HCT VFR BLD AUTO: 38 % (ref 40–53)
HGB BLD-MCNC: 13.4 G/DL (ref 13.3–17.7)
HGB UR QL STRIP: ABNORMAL
IMM GRANULOCYTES # BLD: 0 10E9/L (ref 0–0.4)
IMM GRANULOCYTES NFR BLD: 0.3 %
KETONES UR STRIP-MCNC: NEGATIVE MG/DL
LEUKOCYTE ESTERASE UR QL STRIP: NEGATIVE
LYMPHOCYTES # BLD AUTO: 2.5 10E9/L (ref 0.8–5.3)
LYMPHOCYTES NFR BLD AUTO: 32 %
MCH RBC QN AUTO: 29.1 PG (ref 26.5–33)
MCHC RBC AUTO-ENTMCNC: 35.3 G/DL (ref 31.5–36.5)
MCV RBC AUTO: 83 FL (ref 78–100)
MONOCYTES # BLD AUTO: 0.6 10E9/L (ref 0–1.3)
MONOCYTES NFR BLD AUTO: 7.9 %
MUCOUS THREADS #/AREA URNS LPF: PRESENT /LPF
NEUTROPHILS # BLD AUTO: 4.4 10E9/L (ref 1.6–8.3)
NEUTROPHILS NFR BLD AUTO: 55.8 %
NITRATE UR QL: NEGATIVE
NRBC # BLD AUTO: 0 10*3/UL
NRBC BLD AUTO-RTO: 0 /100
PH UR STRIP: 6.5 PH (ref 5–7)
PLATELET # BLD AUTO: 185 10E9/L (ref 150–450)
POTASSIUM SERPL-SCNC: 3.9 MMOL/L (ref 3.4–5.3)
RBC # BLD AUTO: 4.6 10E12/L (ref 4.4–5.9)
RBC #/AREA URNS AUTO: 2 /HPF (ref 0–2)
SODIUM SERPL-SCNC: 142 MMOL/L (ref 133–144)
SOURCE: ABNORMAL
SP GR UR STRIP: 1.02 (ref 1–1.03)
UROBILINOGEN UR STRIP-MCNC: NORMAL MG/DL (ref 0–2)
WBC # BLD AUTO: 7.9 10E9/L (ref 4–11)
WBC #/AREA URNS AUTO: <1 /HPF (ref 0–5)

## 2019-12-18 PROCEDURE — 96361 HYDRATE IV INFUSION ADD-ON: CPT

## 2019-12-18 PROCEDURE — 96375 TX/PRO/DX INJ NEW DRUG ADDON: CPT

## 2019-12-18 PROCEDURE — 25000128 H RX IP 250 OP 636: Performed by: EMERGENCY MEDICINE

## 2019-12-18 PROCEDURE — 81001 URINALYSIS AUTO W/SCOPE: CPT | Performed by: EMERGENCY MEDICINE

## 2019-12-18 PROCEDURE — 74177 CT ABD & PELVIS W/CONTRAST: CPT

## 2019-12-18 PROCEDURE — 25000128 H RX IP 250 OP 636

## 2019-12-18 PROCEDURE — 25000125 ZZHC RX 250: Performed by: EMERGENCY MEDICINE

## 2019-12-18 PROCEDURE — 96376 TX/PRO/DX INJ SAME DRUG ADON: CPT

## 2019-12-18 PROCEDURE — 25800030 ZZH RX IP 258 OP 636: Performed by: EMERGENCY MEDICINE

## 2019-12-18 PROCEDURE — 85025 COMPLETE CBC W/AUTO DIFF WBC: CPT | Performed by: EMERGENCY MEDICINE

## 2019-12-18 PROCEDURE — 96374 THER/PROPH/DIAG INJ IV PUSH: CPT | Mod: 59

## 2019-12-18 PROCEDURE — 99285 EMERGENCY DEPT VISIT HI MDM: CPT | Mod: 25

## 2019-12-18 PROCEDURE — 80048 BASIC METABOLIC PNL TOTAL CA: CPT | Performed by: EMERGENCY MEDICINE

## 2019-12-18 RX ORDER — HYDROCODONE BITARTRATE AND ACETAMINOPHEN 5; 325 MG/1; MG/1
1 TABLET ORAL EVERY 6 HOURS PRN
Qty: 10 TABLET | Refills: 0 | Status: SHIPPED | OUTPATIENT
Start: 2019-12-18 | End: 2020-02-11

## 2019-12-18 RX ORDER — ONDANSETRON 2 MG/ML
INJECTION INTRAMUSCULAR; INTRAVENOUS
Status: COMPLETED
Start: 2019-12-18 | End: 2019-12-18

## 2019-12-18 RX ORDER — HYDROMORPHONE HYDROCHLORIDE 1 MG/ML
0.5 INJECTION, SOLUTION INTRAMUSCULAR; INTRAVENOUS; SUBCUTANEOUS ONCE
Status: COMPLETED | OUTPATIENT
Start: 2019-12-18 | End: 2019-12-18

## 2019-12-18 RX ORDER — IOPAMIDOL 755 MG/ML
122 INJECTION, SOLUTION INTRAVASCULAR ONCE
Status: COMPLETED | OUTPATIENT
Start: 2019-12-18 | End: 2019-12-18

## 2019-12-18 RX ORDER — LIDOCAINE HYDROCHLORIDE 20 MG/ML
10 JELLY TOPICAL ONCE
Status: DISCONTINUED | OUTPATIENT
Start: 2019-12-18 | End: 2019-12-18 | Stop reason: HOSPADM

## 2019-12-18 RX ORDER — HYDROMORPHONE HYDROCHLORIDE 1 MG/ML
0.5 INJECTION, SOLUTION INTRAMUSCULAR; INTRAVENOUS; SUBCUTANEOUS
Status: COMPLETED | OUTPATIENT
Start: 2019-12-18 | End: 2019-12-18

## 2019-12-18 RX ORDER — SODIUM CHLORIDE 9 MG/ML
1000 INJECTION, SOLUTION INTRAVENOUS CONTINUOUS
Status: DISCONTINUED | OUTPATIENT
Start: 2019-12-18 | End: 2019-12-18 | Stop reason: CLARIF

## 2019-12-18 RX ORDER — ONDANSETRON 2 MG/ML
4 INJECTION INTRAMUSCULAR; INTRAVENOUS EVERY 30 MIN PRN
Status: DISCONTINUED | OUTPATIENT
Start: 2019-12-18 | End: 2019-12-18 | Stop reason: HOSPADM

## 2019-12-18 RX ORDER — HYDROMORPHONE HYDROCHLORIDE 1 MG/ML
INJECTION, SOLUTION INTRAMUSCULAR; INTRAVENOUS; SUBCUTANEOUS
Status: COMPLETED
Start: 2019-12-18 | End: 2019-12-18

## 2019-12-18 RX ORDER — HYDROMORPHONE HYDROCHLORIDE 1 MG/ML
1 INJECTION, SOLUTION INTRAMUSCULAR; INTRAVENOUS; SUBCUTANEOUS ONCE
Status: COMPLETED | OUTPATIENT
Start: 2019-12-18 | End: 2019-12-18

## 2019-12-18 RX ADMIN — ONDANSETRON 4 MG: 2 INJECTION INTRAMUSCULAR; INTRAVENOUS at 04:11

## 2019-12-18 RX ADMIN — SODIUM CHLORIDE 77 ML: 9 INJECTION, SOLUTION INTRAVENOUS at 05:41

## 2019-12-18 RX ADMIN — HYDROMORPHONE HYDROCHLORIDE 1 MG: 1 INJECTION, SOLUTION INTRAMUSCULAR; INTRAVENOUS; SUBCUTANEOUS at 05:29

## 2019-12-18 RX ADMIN — HYDROMORPHONE HYDROCHLORIDE 0.5 MG: 1 INJECTION, SOLUTION INTRAMUSCULAR; INTRAVENOUS; SUBCUTANEOUS at 07:09

## 2019-12-18 RX ADMIN — IOPAMIDOL 122 ML: 755 INJECTION, SOLUTION INTRAVENOUS at 05:38

## 2019-12-18 RX ADMIN — HYDROMORPHONE HYDROCHLORIDE 0.5 MG: 1 INJECTION, SOLUTION INTRAMUSCULAR; INTRAVENOUS; SUBCUTANEOUS at 04:11

## 2019-12-18 RX ADMIN — HYDROMORPHONE HYDROCHLORIDE 0.5 MG: 1 INJECTION, SOLUTION INTRAMUSCULAR; INTRAVENOUS; SUBCUTANEOUS at 04:45

## 2019-12-18 RX ADMIN — SODIUM CHLORIDE 1000 ML: 9 INJECTION, SOLUTION INTRAVENOUS at 04:16

## 2019-12-18 ASSESSMENT — MIFFLIN-ST. JEOR: SCORE: 2044.83

## 2019-12-18 NOTE — ED PROVIDER NOTES
"  History     Chief Complaint:  Rectal Problem      HPI   Cash Cunningham is a 39 year old male who presents to the emergency department for evaluation of a rectal problem.  Patient reports extreme rectal pain starting three days ago that has progressively worsened. The pain is exacerbated with bowel movements, coughing, and palpation. He also has complaints of nausea, diaphoresis, and white drainage from his fistula starting yesterday. Patient reports he has a fistula but feels he has more than one. He is scheduled to have his known fistula operating on at [facility]. He denies urinary symptoms. Of note the patient was seen here last month for small bowel obstruction.     Allergies:  Morphine  Trazodone    Medications:    albuterol   eszopiclone   ondansetron   Ropinirole    Past Medical History:    Depression  Anxiety  ADD  Kidney stones    Past Surgical History:    Tonsillectomy  Arthroscopic knee operation  Small intestine excision  Colon surgery - removal of diverticulitum  Bariatric surgery    Family History:    Hyperlipidemia - father  HTN - father  Melanoma - father  Anxiety - mother  Arthritis - mother  Depression - mother  Migraines - mother, sister    Social History:  Smoking status: never smoker  Alcohol use: yes  Drug use: no  The patient presents to the emergency department by himself.  PCP: [facility]  Marital Status:       Review of Systems   All other systems reviewed and are negative.    Physical Exam     Patient Vitals for the past 24 hrs:   BP Temp Temp src Pulse Heart Rate Resp SpO2 Height Weight   12/18/19 0600 -- -- -- -- -- -- 98 % -- --   12/18/19 0551 116/57 -- -- 74 -- -- 98 % -- --   12/18/19 0416 116/73 -- -- 66 66 23 97 % -- --   12/18/19 0315 (!) 142/96 98.1  F (36.7  C) Oral 83 -- 25 97 % 1.803 m (5' 11\") 109.8 kg (242 lb)       Physical Exam  General: Resting on the bed.  laying on his side.  Appears uncomfortable   Head: No obvious trauma to head.  Ears, Nose, Throat:  " External ears normal.  Nose normal.    Eyes:  Conjunctivae clear.  Pupils are equal, round, and reactive.   Neck: Normal range of motion.  Neck supple.   CV: Regular rate and rhythm.  No murmurs.      Respiratory: Effort normal and breath sounds normal.  No wheezing or crackles.   Gastrointestinal: Soft.  No distension. There is no tenderness.  There is no rigidity, no rebound and no guarding.   Skin: Skin is warm and dry.  No rash noted.   Rectal: Area of fluctuance and tenderness appreciable along the anterior anus.  Tenderness with rectal examination.  Unable to identify the fistula tract.  Emergency Department Course   Imaging:  Radiographic findings were communicated with the patient who voiced understanding of the findings.  CT Abdomen pelvis with contrast:   IMPRESSION:   1. No acute cause of abdominal pain identified.    2. A few small lymph nodes adjacent to a small esophageal hiatal hernia. Esophagus above this could be slightly thick-walled. This may relate to reflux. Consider endoscopy for further evaluation.    3. Slight fatty liver. as per radiology.    ADDENDUM: There is a tiny rim-enhancing fluid collection along the anterior aspect of the anus compatible with a perianal abscess. This measures approximately 1.2 x 0.7 x 1.4 cm.    Laboratory:  CBC: WBC: 7.9, HGB: 13.4, PLT: 185  BMP: Glucose 89, chloride 111 (H), calcium 8.4 (L), o/w WNL (Creatinine: 0.85)  UA: blood trace, mucous present, o/w Negative    Interventions:  0411 Dilaudid 0.5 mg IV  0411 Zofran 4 mg IV  0416 NS 1000 mL IV  0445 Dilaudid 0.5 mg IV  0529 Dilaudid 1 mg IV  0709 Dilaudid 0.5 mg IV    Emergency Department Course:  Nursing notes and vitals reviewed. (262) I performed an exam of the patient as documented above.     IV inserted. Medicine administered as documented above. Blood drawn. This was sent to the lab for further testing, results above.     The patient was sent for an abdomen/pelvis CT scan while in the emergency  department. Results described above.    624 I consulted with Dr. Teixeira of radiology and discussed the patient.    629 I rechecked the patient and discussed the results of his workup thus far.     638 I consulted with Dr. Montanez of colorectal surgery and discussed the patient.    7:08 AM the patient was signed out to my colleague Dr. Anthony who will follow up with the patient. Pending colorectal exam.       Impression & Plan    Medical Decision Makin-year-old male with a known fistula presents with anal pain.  Vital signs are reassuring.  Broad differential was pursued including but not limited to hemorrhoid, perianal abscess, perirectal abscess, fistula, obstruction, colitis, diverticulitis, etc.  Overall patient appears quite uncomfortable.  It is very difficult external examination of the rectum.  CBC shows no leukocytosis or anemia.  BMP shows no acute electrolyte, metabolic, renal dysfunction.  UA is unremarkable without evidence of infection or blood to suggest nephrolithiasis.  CT scan was obtained showing evidence of a small perianal abscess.  I am concerned that patient also has a known fistula and is due to have this repaired.  I discussed the case with colorectal surgery given the fistula and the new perianal abscess.  They recommend colorectal assessment and likely operative intervention.  Patient was given pain medications in the ER.  I went over patient's results and CT scans with him.  He is agreeable to this plan.  He was also made aware that he needs to have his hiatal hernia and esophagitis examined with GI.  He was given a copy of his results in order to help facilitate outpatient management.  Patient will be signed out to my partner at 7 AM Dr. Anthony pending colorectal plan assessment.  Patient remained stable under my care.    Diagnosis:    ICD-10-CM    1. Perianal abscess K61.0    2. Anal fistula K60.3    3. Gastroesophageal reflux disease with esophagitis K21.0  GASTROENTEROLOGY ADULT REF PROCEDURE ONLY       Disposition:  Pending  Juan Jimenez  12/18/2019    EMERGENCY DEPARTMENT  Scribe Disclosure:  I, Juan Jimenez, am serving as a scribe at 4:42 AM on 12/18/2019 to document services personally performed by Brittany Diane MD based on my observations and the provider's statements to me.        Brittany Diane MD  12/18/19 0718

## 2019-12-18 NOTE — ED NOTES
"[facility]  ED Nurse Handoff Report    ED Chief complaint: Rectal Problem      ED Diagnosis:   Final diagnoses:   None       Code Status: Full Code    Allergies:   Allergies   Allergen Reactions     Cat Hair Extract Itching     eye lids may swell     Fructose GI Disturbance     Lactose GI Disturbance     Stomach cramps; bloating; and gas     Morphine Itching     Trazodone Other (See Comments)     Muscle spasms       Activity level - Baseline/Home:  Independent  Activity Level - Current:   Independent    Patient's Preferred language: english   Needed?: No    Isolation: No  Infection: Not Applicable  Bariatric?: No    Vital Signs:   Vitals:    12/18/19 0315 12/18/19 0416 12/18/19 0551 12/18/19 0600   BP: (!) 142/96 116/73 116/57    Pulse: 83 66 74    Resp: 25 23     Temp: 98.1  F (36.7  C)      TempSrc: Oral      SpO2: 97% 97% 98% 98%   Weight: 109.8 kg (242 lb)      Height: 1.803 m (5' 11\")          Cardiac Rhythm: ,        Pain level: 0-10 Pain Scale: 10    Is this patient confused?: No   Does this patient have a guardian?  No         If yes, is there guardianship documents in the Epic \"Code/ACP\" activity?  N/A         Guardian Notified?  N/A  [place] - Suicide Severity Rating Scale Completed?  Yes  If yes, what color did the patient score?  White    Patient Report: Initial Complaint: rectal pain  Focused Assessment: severe rectal pain- purulent and bloody drainage from painful rectal site. 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   Tests Performed:   Results for orders placed or performed during the hospital encounter of 12/18/19   Basic metabolic panel     Status: Abnormal   Result Value Ref Range    Sodium 142 133 - 144 mmol/L    Potassium 3.9 3.4 - 5.3 mmol/L    Chloride 111 (H) 94 - 109 mmol/L    Carbon Dioxide 25 20 - 32 mmol/L    Anion Gap 6 3 - 14 mmol/L    Glucose 89 70 - 99 mg/dL    Urea Nitrogen 16 7 - 30 mg/dL    Creatinine " 0.85 0.66 - 1.25 mg/dL    GFR Estimate >90 >60 mL/min/[1.73_m2]    GFR Estimate If Black >90 >60 mL/min/[1.73_m2]    Calcium 8.4 (L) 8.5 - 10.1 mg/dL   CBC with platelets differential     Status: Abnormal   Result Value Ref Range    WBC 7.9 4.0 - 11.0 10e9/L    RBC Count 4.60 4.4 - 5.9 10e12/L    Hemoglobin 13.4 13.3 - 17.7 g/dL    Hematocrit 38.0 (L) 40.0 - 53.0 %    MCV 83 78 - 100 fl    MCH 29.1 26.5 - 33.0 pg    MCHC 35.3 31.5 - 36.5 g/dL    RDW 13.4 10.0 - 15.0 %    Platelet Count 185 150 - 450 10e9/L    Diff Method Automated Method     % Neutrophils 55.8 %    % Lymphocytes 32.0 %    % Monocytes 7.9 %    % Eosinophils 3.4 %    % Basophils 0.6 %    % Immature Granulocytes 0.3 %    Nucleated RBCs 0 0 /100    Absolute Neutrophil 4.4 1.6 - 8.3 10e9/L    Absolute Lymphocytes 2.5 0.8 - 5.3 10e9/L    Absolute Monocytes 0.6 0.0 - 1.3 10e9/L    Absolute Eosinophils 0.3 0.0 - 0.7 10e9/L    Absolute Basophils 0.1 0.0 - 0.2 10e9/L    Abs Immature Granulocytes 0.0 0 - 0.4 10e9/L    Absolute Nucleated RBC 0.0    UA with Microscopic     Status: Abnormal   Result Value Ref Range    Color Urine Yellow     Appearance Urine Clear     Glucose Urine Negative NEG^Negative mg/dL    Bilirubin Urine Negative NEG^Negative    Ketones Urine Negative NEG^Negative mg/dL    Specific Gravity Urine 1.017 1.003 - 1.035    Blood Urine Trace (A) NEG^Negative    pH Urine 6.5 5.0 - 7.0 pH    Protein Albumin Urine Negative NEG^Negative mg/dL    Urobilinogen mg/dL Normal 0.0 - 2.0 mg/dL    Nitrite Urine Negative NEG^Negative    Leukocyte Esterase Urine Negative NEG^Negative    Source Midstream Urine     WBC Urine <1 0 - 5 /HPF    RBC Urine 2 0 - 2 /HPF    Mucous Urine Present (A) NEG^Negative /LPF     Abnormal Results: rectal abscess   Treatments provided: see MAR    Family Comments:     OBS brochure/video discussed/provided to patient/family: N/A              Name of person given brochure if not patient:               Relationship to patient:     ED  Medications:   Medications   0.9% sodium chloride BOLUS (0 mLs Intravenous Stopped 12/18/19 0531)     Followed by   sodium chloride 0.9% infusion (has no administration in time range)   ondansetron (ZOFRAN) injection 4 mg (4 mg Intravenous Given 12/18/19 0411)   lidocaine (XYLOCAINE) 2 % external gel 10 mL (has no administration in time range)   HYDROmorphone (PF) (DILAUDID) injection 0.5 mg (0.5 mg Intravenous Given 12/18/19 0411)   HYDROmorphone (PF) (DILAUDID) injection 0.5 mg (0.5 mg Intravenous Given 12/18/19 0445)   iopamidol (ISOVUE-370) solution 122 mL (122 mLs Intravenous Given 12/18/19 0538)   Saline Flush (77 mLs Intravenous Given 12/18/19 0541)   HYDROmorphone (PF) (DILAUDID) injection 1 mg (1 mg Intravenous Given 12/18/19 0529)       Drips infusing?:  No    For the majority of the shift this patient was Green.   Interventions performed were .    Severe Sepsis OR Septic Shock Diagnosis Present: No    To be done/followed up on inpatient unit:  none at this time     ED NURSE PHONE NUMBER: [phone]

## 2019-12-18 NOTE — ED AVS SNAPSHOT
Emergency Department  [address] [place] [address]  Phone:  [phone]  Fax:  [phone]                                    Cash Cunningham   MRN: [id number]    Department:   Emergency Department   Date of Visit:  12/18/2019           After Visit Summary Signature Page    I have received my discharge instructions, and my questions have been answered. I have discussed any challenges I see with this plan with the nurse or doctor.    ..........................................................................................................................................  Patient/Patient Representative Signature      ..........................................................................................................................................  Patient Representative Print Name and Relationship to Patient    ..................................................               ................................................  Date                                   Time    ..........................................................................................................................................  Reviewed by Signature/Title    ...................................................              ..............................................  Date                                               Time          22EPIC Rev 08/18

## 2019-12-18 NOTE — ED TRIAGE NOTES
Patient reports extreme rectal pain X 2 days. Pt has fistula- reports he feels he has more than one.pt reports chills and a general ill feeling. Patient seen here last month for small bowel obstruction- unsure if this is related.

## 2019-12-19 NOTE — TELEPHONE ENCOUNTER
S: Calling about fever.  B: Cash gave writer permission to speak with his wife about his health care. Went to the ED around 0300 today. CT scan was obtained showing evidence of a small perianal abscess. While in the ED had perianal abscess lanced, drained, and packed.  On 12/19 having out patient surgery at the [facility] to get the anal fistula removed. Calling tonight with fever, nausea, and migraine.  Fever started around 7 pm, 102.1 taken with forehead thermometer. Red drainage coming though packing on rectal abscess area.  Taking fluids no solids does not want to have a BM due to the pain it would cause.    A: Per care guidelines can care for fever at home.  R: Reviewed care advice and when to come to ED if fever goes to high.  Encouraged to call back any time.  Alexsandra Rodriguez RN, [facility]      Additional Information    Negative: Shock suspected (e.g., cold/pale/clammy skin, too weak to stand, low BP, rapid pulse)    Negative: Difficult to awaken or acting confused (e.g., disoriented, slurred speech)    Negative: [1] Difficulty breathing AND [2] bluish lips, tongue or face    Negative: New onset rash with multiple purple (or blood-colored) spots or dots    Negative: Sounds like a life-threatening emergency to the triager    Negative: [1] Headache AND [2] stiff neck (can't touch chin to chest)    Negative: Difficulty breathing    Negative: IV drug abuse    Negative: [1] Drinking very little AND [2] dehydration suspected (e.g., no urine > 12 hours, very dry mouth, very lightheaded)    Negative: Patient sounds very sick or weak to the triager  (Exception: mild weakness and hasn't taken fever medicine)    Negative: Fever > 104 F (40 C)    Negative: [1] Fever > 101 F (38.3 C) AND [2] age > 60    Negative: [1] Fever > 100.0 F (37.8 C) AND [2] bedridden (e.g., nursing home patient, CVA, chronic illness, recovering from surgery)    Negative: [1] Fever > 100.0 F (37.8 C) AND [2] indwelling urinary  catheter (e.g., [place], [place])    Negative: [1] Fever > 100.0 F (37.8 C) AND [2] has port (portacath), central line, or PICC line    Negative: [1] Fever > 100.0 F (37.8 C) AND [2] diabetes mellitus or weak immune system (e.g., HIV positive, cancer chemo, splenectomy, chronic steroids)    Negative: [1] Fever > 100.0 F (37.8 C) AND [2] surgery in the last month    Negative: Transplant patient (e.g., kidney, liver, lung, heart)    Negative: Fever present > 3 days (72 hours)    Negative: [1] Fever > 100.0 F (37.8 C) AND [2] foreign travel to a developing country in the last month    Negative: [1] Intermittent fever > 100.0 F (37.8 C) AND [2] lasts > 3 weeks    [1] Fever AND [2] no signs of serious infection or localizing symptoms (all other triage questions negative)    Protocols used: FEVER-A-AH

## 2019-12-19 NOTE — CONSULTS
[facility]  Colon and Rectal Surgery Consult Note  Name: Cash Cunningham    MRN: [id number]  YOB: 1980    Age: 39 year old  Date of admission: 12/18/2019  Primary care provider: Lupis Muir     Requesting Physician:  Dr. Diane   Reason for consult:  Abscess and fistula           History of Present Illness:   Cash Cunningham is a 39 year old male, seen at the request of Dr. Diane, who presents with worsening rectal pain for the past 3 days. He states he has had rectal drainage intermittently for about a year and recently saw a surgeon who diagnosed him with a fistula. He is scheduled for fistula surgery 1/6/20 in [place], [place]. He has never had an abscess before. 3 days ago he was on vacation and began having rectal pain that has only gotten worse. He notices some drainage, but not more than his typical drainage. The drainage is thick and white. He denies any bleeding. Bowel movements have become increasingly difficult. Typically he has 4-5 soft and formed bowel movements daily, but over the past three days this number has decreased and they have become extremely painful. He denies fevers but reports chills.            Past Medical History:   History reviewed. No pertinent past medical history.          Past Surgical History:   History reviewed. No pertinent surgical history.            Social History:     Social History     Tobacco Use     Smoking status: Never Smoker     Smokeless tobacco: Never Used   Substance Use Topics     Alcohol use: Yes             Family History:   History reviewed. No pertinent family history.          Allergies:     Allergies   Allergen Reactions     Cat Hair Extract Itching     eye lids may swell     Fructose GI Disturbance     Lactose GI Disturbance     Stomach cramps; bloating; and gas     Morphine Itching     Trazodone Other (See Comments)     Muscle spasms             Medications:             Review of Systems:   A comprehensive greater than 10  "system review of systems was carried out.  Pertinent positives and negatives are noted above.  Otherwise negative for contributory info.            Physical Exam:     Blood pressure 112/75, pulse 74, temperature 98.1  F (36.7  C), temperature source Oral, resp. rate 18, height 1.803 m (5' 11\"), weight 109.8 kg (242 lb), SpO2 99 %.  No intake or output data in the 24 hours ending 12/18/19 1923  Exam:  General - Awake alert and oriented, appears stated age  Pulm - Non-labored breathing with normal respiratory effort  CVS - reg rate and rhythm, no peripheral edema  Abd - soft, non tender non distended..  No guarding, rigidity or peritoneal signs.   Rectal exam was performed. External anal skin exam revealed a very small, superficial area of fluctuance and tenderness in the right anterior position. DREW with normal resting sphincter tone and pain with palpation anteriorly. Otherwise normal rectal mucosa, no palpable masses.   Neuro - CN II-XII grossly intact  Musculoskeletal - extremities with no clubbing, cyanosis or edema; able to ambulate  Psych - responsive, alert, cooperative; oriented x3; appropriate mood and affect  External/skin - inspection reveals no rashes, lesions or ulcers, normal coloring    Procedure:     The are was cleaned and 5 ml 1% lidocaine with epinephrine was injected into the surrounding tissue. Once anesthetic was achieved an 11 blade scalpel was used to make a cruciate incision. Approximately 5 ml of purulent drainage was expelled from the incision. The skin flaps were then trimmed. The wound was probed with a scissors and did seem to track to the rectum. Wound was packed with a corner of gauze and covered with gauze. Hemostasis was achieved with pressure. The patient tolerated the procedure well.          Data Reviewed:     Recent Results (from the past 24 hour(s))   CT Abdomen Pelvis w Contrast    Addendum: 12/18/2019    ADDENDUM: There is a tiny rim-enhancing fluid collection along the " anterior aspect of the anus compatible with a perianal abscess. This measures approximately 1.2 x 0.7 x 1.4 cm.    This additional finding was discussed with ER physician at 6:20 AM.      Narrative    EXAM: CT ABDOMEN PELVIS W CONTRAST  LOCATION: [facility]  DATE/TIME: 12/18/2019 5:26 AM    INDICATION: Abdominal pain, acute, generalized.    COMPARISON: 11/12/2019.    TECHNIQUE: CT scan of the abdomen and pelvis was performed following injection of IV contrast. Multiplanar reformats were obtained. Dose reduction techniques were used.    CONTRAST: 122 mL Isovue-370.    FINDINGS:   LOWER CHEST: Normal.    HEPATOBILIARY: Slight fatty infiltration of the liver. Otherwise negative.    PANCREAS: Normal.    SPLEEN: Normal.    ADRENAL GLANDS: Normal.    KIDNEYS/BLADDER: Normal.    BOWEL: Postoperative changes with anastomosis in the mid small bowel. No bowel obstruction or wall thickening. Small esophageal hiatal hernia. There are again a few small lymph nodes in the lower mediastinum adjacent to the hiatal hernia, stable.   Esophagus just above this could be slightly thick-walled.    LYMPH NODES: No pathologic abdominal or pelvic lymph nodes.    VASCULATURE: Unremarkable.    PELVIC ORGANS: Normal.    OTHER: Postoperative changes anterior abdominal wall. No ascites or free air. Small fat-containing left inguinal hernia.    MUSCULOSKELETAL: Normal.      Impression    IMPRESSION:   1. No acute cause of abdominal pain identified.    2. A few small lymph nodes adjacent to a small esophageal hiatal hernia. Esophagus above this could be slightly thick-walled. This may relate to reflux. Consider endoscopy for further evaluation.    3. Slight fatty liver.           Recent Labs   Lab 12/18/19  0416   WBC 7.9   HGB 13.4   HCT 38.0*   MCV 83             Lab Results   Component Value Date     12/18/2019     11/13/2019     11/12/2019    Lab Results   Component Value Date    CHLORIDE 111 12/18/2019     CHLORIDE 109 11/13/2019    CHLORIDE 105 11/12/2019    Lab Results   Component Value Date    BUN 16 12/18/2019    BUN 7 11/13/2019    BUN 13 11/12/2019      Lab Results   Component Value Date    POTASSIUM 3.9 12/18/2019    POTASSIUM 3.8 11/13/2019    POTASSIUM 3.7 11/12/2019    Lab Results   Component Value Date    CO2 25 12/18/2019    CO2 26 11/13/2019    CO2 31 11/12/2019    Lab Results   Component Value Date    CR 0.85 12/18/2019    CR 0.91 11/13/2019    CR 1.00 11/12/2019            Assessment and Plan:   Cash Cunningham is a 39 year old male who presented with worsening rectal pain. Patient reports known fistula. I performed an I&D of small superficial perianal abscess in the ED. AVSS. No leukocytosis. Patient should follow up with prior surgeon in [place] for definitive treatment of his fistula.     Plan:  1. I&D performed by myself in the ED  2. Recommend small Rx for Oxycodone as well as alternating Tylenol and Ibuprofen  3. No need for antibiotics  4. Discussed wound cares with the patient  5. Warm baths will help with pain and comfort as well as heal wound by promoting any further drainage.   6. Recommend a stool softener to prevent constipation, especially when taking narcotic pain medication  7. Labs:   - Reviewed: Yes  - Ordered: none   8. Imaging:   - Dr. Goldman and myself have personally viewed: CT abd/pelvis  - Ordered:  none  9. This plan has been discussed with Dr. Goldman    Patient specific identified risk factors considered as part of today s evaluation include: BMI>30 (Blood thinners, BMI>30, Smoker, Diabetic etc. etc.)      Additional history obtained from patient and chart notes.  Time spent on consultation: 35 minutes, greater than 50% spent on counseling and/or coordination of care.    Yelitza Chapman PA-C  [facility]  [phone]

## 2019-12-30 ENCOUNTER — RECORDS - HEALTHEAST (OUTPATIENT)
Dept: ADMINISTRATIVE | Facility: OTHER | Age: 39
End: 2019-12-30

## 2019-12-30 ENCOUNTER — HOSPITAL ENCOUNTER (EMERGENCY)
Facility: CLINIC | Age: 39
Discharge: HOME | End: 2019-12-31
Attending: EMERGENCY MEDICINE | Admitting: EMERGENCY MEDICINE
Payer: COMMERCIAL

## 2019-12-30 DIAGNOSIS — S06.0X9A CONCUSSION WITH LOSS OF CONSCIOUSNESS, INITIAL ENCOUNTER: ICD-10-CM

## 2019-12-30 PROCEDURE — 99284 EMERGENCY DEPT VISIT MOD MDM: CPT | Mod: 25

## 2019-12-30 ASSESSMENT — MIFFLIN-ST. JEOR: SCORE: 2051.63

## 2019-12-30 NOTE — ED AVS SNAPSHOT
Emergency Department  [address] [place] [address]  Phone:  [phone]  Fax:  [phone]                                    Cash Cunningham   MRN: [id number]    Department:   Emergency Department   Date of Visit:  12/30/2019           After Visit Summary Signature Page    I have received my discharge instructions, and my questions have been answered. I have discussed any challenges I see with this plan with the nurse or doctor.    ..........................................................................................................................................  Patient/Patient Representative Signature      ..........................................................................................................................................  Patient Representative Print Name and Relationship to Patient    ..................................................               ................................................  Date                                   Time    ..........................................................................................................................................  Reviewed by Signature/Title    ...................................................              ..............................................  Date                                               Time          22EPIC Rev 08/18

## 2019-12-31 ENCOUNTER — APPOINTMENT (OUTPATIENT)
Dept: CT IMAGING | Facility: CLINIC | Age: 39
End: 2019-12-31
Attending: EMERGENCY MEDICINE
Payer: COMMERCIAL

## 2019-12-31 ENCOUNTER — RECORDS - HEALTHEAST (OUTPATIENT)
Dept: ADMINISTRATIVE | Facility: OTHER | Age: 39
End: 2019-12-31

## 2019-12-31 ENCOUNTER — HOSPITAL ENCOUNTER (OUTPATIENT)
Facility: CLINIC | Age: 39
Discharge: HOME | End: 2019-12-31
Attending: SPECIALIST | Admitting: SPECIALIST
Payer: COMMERCIAL

## 2019-12-31 VITALS
HEIGHT: 72 IN | DIASTOLIC BLOOD PRESSURE: 74 MMHG | SYSTOLIC BLOOD PRESSURE: 106 MMHG | HEART RATE: 61 BPM | WEIGHT: 240 LBS | OXYGEN SATURATION: 96 % | RESPIRATION RATE: 11 BRPM | BODY MASS INDEX: 32.51 KG/M2

## 2019-12-31 VITALS
DIASTOLIC BLOOD PRESSURE: 67 MMHG | WEIGHT: 240 LBS | HEART RATE: 60 BPM | RESPIRATION RATE: 18 BRPM | OXYGEN SATURATION: 99 % | HEIGHT: 72 IN | TEMPERATURE: 97.7 F | SYSTOLIC BLOOD PRESSURE: 132 MMHG | BODY MASS INDEX: 32.51 KG/M2

## 2019-12-31 DIAGNOSIS — K21.00 GASTROESOPHAGEAL REFLUX DISEASE WITH ESOPHAGITIS: Primary | ICD-10-CM

## 2019-12-31 LAB — UPPER GI ENDOSCOPY: NORMAL

## 2019-12-31 PROCEDURE — 72125 CT NECK SPINE W/O DYE: CPT

## 2019-12-31 PROCEDURE — G0500 MOD SEDAT ENDO SERVICE >5YRS: HCPCS | Performed by: SPECIALIST

## 2019-12-31 PROCEDURE — 70450 CT HEAD/BRAIN W/O DYE: CPT

## 2019-12-31 PROCEDURE — 88305 TISSUE EXAM BY PATHOLOGIST: CPT | Performed by: SPECIALIST

## 2019-12-31 PROCEDURE — 88305 TISSUE EXAM BY PATHOLOGIST: CPT | Mod: 26 | Performed by: SPECIALIST

## 2019-12-31 PROCEDURE — 25000125 ZZHC RX 250: Performed by: SPECIALIST

## 2019-12-31 PROCEDURE — 88312 SPECIAL STAINS GROUP 1: CPT | Mod: 26 | Performed by: SPECIALIST

## 2019-12-31 PROCEDURE — 25000128 H RX IP 250 OP 636: Performed by: SPECIALIST

## 2019-12-31 PROCEDURE — 88312 SPECIAL STAINS GROUP 1: CPT | Performed by: SPECIALIST

## 2019-12-31 PROCEDURE — 43239 EGD BIOPSY SINGLE/MULTIPLE: CPT | Performed by: SPECIALIST

## 2019-12-31 RX ORDER — NALOXONE HYDROCHLORIDE 0.4 MG/ML
.1-.4 INJECTION, SOLUTION INTRAMUSCULAR; INTRAVENOUS; SUBCUTANEOUS
Status: DISCONTINUED | OUTPATIENT
Start: 2019-12-31 | End: 2019-12-31 | Stop reason: HOSPADM

## 2019-12-31 RX ORDER — FLUMAZENIL 0.1 MG/ML
0.2 INJECTION, SOLUTION INTRAVENOUS
Status: DISCONTINUED | OUTPATIENT
Start: 2019-12-31 | End: 2019-12-31 | Stop reason: HOSPADM

## 2019-12-31 RX ORDER — LIDOCAINE 40 MG/G
CREAM TOPICAL
Status: DISCONTINUED | OUTPATIENT
Start: 2019-12-31 | End: 2019-12-31 | Stop reason: HOSPADM

## 2019-12-31 RX ORDER — OMEPRAZOLE 40 MG/1
40 CAPSULE, DELAYED RELEASE ORAL DAILY
Qty: 60 CAPSULE | Refills: 0 | Status: SHIPPED | OUTPATIENT
Start: 2019-12-31 | End: 2020-07-18

## 2019-12-31 RX ORDER — ONDANSETRON 2 MG/ML
4 INJECTION INTRAMUSCULAR; INTRAVENOUS EVERY 6 HOURS PRN
Status: DISCONTINUED | OUTPATIENT
Start: 2019-12-31 | End: 2019-12-31 | Stop reason: HOSPADM

## 2019-12-31 RX ORDER — ONDANSETRON 4 MG/1
4 TABLET, ORALLY DISINTEGRATING ORAL EVERY 6 HOURS PRN
Status: DISCONTINUED | OUTPATIENT
Start: 2019-12-31 | End: 2019-12-31 | Stop reason: HOSPADM

## 2019-12-31 RX ORDER — FENTANYL CITRATE 50 UG/ML
INJECTION, SOLUTION INTRAMUSCULAR; INTRAVENOUS PRN
Status: DISCONTINUED | OUTPATIENT
Start: 2019-12-31 | End: 2019-12-31 | Stop reason: HOSPADM

## 2019-12-31 RX ORDER — ONDANSETRON 2 MG/ML
4 INJECTION INTRAMUSCULAR; INTRAVENOUS
Status: DISCONTINUED | OUTPATIENT
Start: 2019-12-31 | End: 2019-12-31 | Stop reason: HOSPADM

## 2019-12-31 ASSESSMENT — ENCOUNTER SYMPTOMS
AGITATION: 1
DIZZINESS: 1
NECK PAIN: 1

## 2019-12-31 ASSESSMENT — MIFFLIN-ST. JEOR: SCORE: 2051.76

## 2019-12-31 NOTE — H&P
Pre-Endoscopy History and Physical     Cash Cunningham MRN# [id number]   YOB: 1980 Age: 39 year old     Date of Procedure: 12/31/2019  Primary care provider: Lupis [place]  Type of Endoscopy: Gastroscopy with possible biopsy, possible dilation  Reason for Procedure: dysphagia, heartburn, abnormal CT scan  Type of Anesthesia Anticipated: Conscious Sedation    HPI:    Cash is a 39 year old male who will be undergoing the above procedure.      A history and physical has been performed. The patient's medications and allergies have been reviewed. The risks and benefits of the procedure and the sedation options and risks were discussed with the patient.  All questions were answered and informed consent was obtained.      He denies a personal or family history of anesthesia complications or bleeding disorders.     Patient Active Problem List   Diagnosis     Small bowel obstruction (H)        Past Medical History:   Diagnosis Date     Depressive disorder     early 20's     Hiatal hernia      Left groin hernia      Restless leg syndrome     uses Requip     Small bowel obstruction (H) 2019        Past Surgical History:   Procedure Laterality Date     COLONOSCOPY       REMOVE FISTULA ANAL      x2     SMALL BOWEL RESECTION  2010     TONSILLECTOMY & ADENOIDECTOMY         Social History     Tobacco Use     Smoking status: Never Smoker     Smokeless tobacco: Never Used   Substance Use Topics     Alcohol use: Yes     Comment: 1 drink a month       No family history on file.    Prior to Admission medications    Medication Sig Start Date End Date Taking? Authorizing Provider   eszopiclone (LUNESTA) 3 MG tablet Take 3 mg by mouth At Bedtime   Yes Unknown, Entered By History   ketotifen (ZADITOR/REFRESH ANTI-ITCH) 0.025 % ophthalmic solution Place 1 drop into both eyes 2 times daily   Yes Unknown, Entered By History   ondansetron (ZOFRAN) 4 MG tablet Take 1 tablet (4 mg) by mouth every 8 hours as needed for nausea  "11/13/19  Yes Adriel Sewell,    rOPINIRole (REQUIP) 1 MG tablet Take 1 mg by mouth At Bedtime   Yes Unknown, Entered By History   albuterol (PROAIR HFA/PROVENTIL HFA/VENTOLIN HFA) 108 (90 Base) MCG/ACT inhaler Inhale 2 puffs into the lungs every 6 hours as needed for shortness of breath / dyspnea or wheezing    Unknown, Entered By History   azelastine (ASTELIN) 0.1 % nasal spray Spray 1-2 sprays into both nostrils 2 times daily    Unknown, Entered By History   HYDROcodone-acetaminophen (NORCO) 5-325 MG tablet Take 1 tablet by mouth every 6 hours as needed 12/18/19 12/21/19  Cali Anthony MD       Allergies   Allergen Reactions     Cat Hair Extract Itching     eye lids may swell     Fructose GI Disturbance     Lactose GI Disturbance     Stomach cramps; bloating; and gas     Morphine Itching     Trazodone Other (See Comments)     Muscle spasms        REVIEW OF SYSTEMS:   5 point ROS negative except as noted above in HPI, including Gen., Resp., CV, GI &  system review.    PHYSICAL EXAM:   /81   Pulse 72   Resp 14   Ht 1.829 m (6' 0.01\")   Wt 108.9 kg (240 lb)   SpO2 99%   BMI 32.54 kg/m   Estimated body mass index is 32.54 kg/m  as calculated from the following:    Height as of this encounter: 1.829 m (6' 0.01\").    Weight as of this encounter: 108.9 kg (240 lb).   GENERAL APPEARANCE: alert, and oriented  MENTAL STATUS: alert  AIRWAY EXAM: Mallampatti Class II (visualization of the soft palate, fauces, and uvula)  RESP: lungs clear to auscultation - no rales, rhonchi or wheezes  CV: regular rates and rhythm  DIAGNOSTICS:    Not indicated    IMPRESSION   ASA Class 1 - Healthy patient, no medical problems    PLAN:   Plan for Gastroscopy with possible biopsy, possible dilation. We discussed the risks, benefits and alternatives and the patient wished to proceed.    The above has been forwarded to the consulting provider.      Signed Electronically by: Jeremy Villareal, " MD  December 31, 2019
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
## 2020-01-03 ENCOUNTER — AMBULATORY - HEALTHEAST (OUTPATIENT)
Dept: NEUROLOGY | Facility: CLINIC | Age: 40
End: 2020-01-03

## 2020-01-03 DIAGNOSIS — S06.0XAA CONCUSSION: ICD-10-CM

## 2020-01-03 LAB — COPATH REPORT: NORMAL

## 2020-02-10 ENCOUNTER — HEALTH MAINTENANCE LETTER (OUTPATIENT)
Age: 40
End: 2020-02-10

## 2020-02-11 ENCOUNTER — APPOINTMENT (OUTPATIENT)
Dept: CT IMAGING | Facility: CLINIC | Age: 40
DRG: 378 | End: 2020-02-11
Attending: EMERGENCY MEDICINE
Payer: COMMERCIAL

## 2020-02-11 ENCOUNTER — HOSPITAL ENCOUNTER (INPATIENT)
Facility: CLINIC | Age: 40
LOS: 1 days | Discharge: HOME | DRG: 378 | End: 2020-02-12
Attending: EMERGENCY MEDICINE | Admitting: HOSPITALIST
Payer: COMMERCIAL

## 2020-02-11 DIAGNOSIS — K56.609 SMALL BOWEL OBSTRUCTION (H): ICD-10-CM

## 2020-02-11 LAB
ABO + RH BLD: NORMAL
ABO + RH BLD: NORMAL
ALBUMIN SERPL-MCNC: 4.2 G/DL (ref 3.4–5)
ALBUMIN UR-MCNC: NEGATIVE MG/DL
ALP SERPL-CCNC: 72 U/L (ref 40–150)
ALT SERPL W P-5'-P-CCNC: 63 U/L (ref 0–70)
ANION GAP SERPL CALCULATED.3IONS-SCNC: 7 MMOL/L (ref 3–14)
APPEARANCE UR: CLEAR
AST SERPL W P-5'-P-CCNC: 29 U/L (ref 0–45)
BASOPHILS # BLD AUTO: 0.1 10E9/L (ref 0–0.2)
BASOPHILS NFR BLD AUTO: 0.6 %
BILIRUB SERPL-MCNC: 0.6 MG/DL (ref 0.2–1.3)
BILIRUB UR QL STRIP: NEGATIVE
BLD GP AB SCN SERPL QL: NORMAL
BLOOD BANK CMNT PATIENT-IMP: NORMAL
BUN SERPL-MCNC: 17 MG/DL (ref 7–30)
CALCIUM SERPL-MCNC: 9.1 MG/DL (ref 8.5–10.1)
CHLORIDE SERPL-SCNC: 114 MMOL/L (ref 94–109)
CO2 SERPL-SCNC: 20 MMOL/L (ref 20–32)
COLOR UR AUTO: NORMAL
CREAT SERPL-MCNC: 1.05 MG/DL (ref 0.66–1.25)
DIFFERENTIAL METHOD BLD: NORMAL
EOSINOPHIL # BLD AUTO: 0.4 10E9/L (ref 0–0.7)
EOSINOPHIL NFR BLD AUTO: 4.8 %
ERYTHROCYTE [DISTWIDTH] IN BLOOD BY AUTOMATED COUNT: 13.6 % (ref 10–15)
GFR SERPL CREATININE-BSD FRML MDRD: >90 ML/MIN/{1.73_M2}
GLUCOSE SERPL-MCNC: 119 MG/DL (ref 70–99)
GLUCOSE UR STRIP-MCNC: NEGATIVE MG/DL
HCT VFR BLD AUTO: 43.3 % (ref 40–53)
HGB BLD-MCNC: 14.3 G/DL (ref 13.3–17.7)
HGB BLD-MCNC: 15.4 G/DL (ref 13.3–17.7)
HGB UR QL STRIP: NEGATIVE
IMM GRANULOCYTES # BLD: 0 10E9/L (ref 0–0.4)
IMM GRANULOCYTES NFR BLD: 0.2 %
KETONES UR STRIP-MCNC: NEGATIVE MG/DL
LACTATE BLD-SCNC: 1.4 MMOL/L (ref 0.7–2)
LEUKOCYTE ESTERASE UR QL STRIP: NEGATIVE
LIPASE SERPL-CCNC: 180 U/L (ref 73–393)
LYMPHOCYTES # BLD AUTO: 3.1 10E9/L (ref 0.8–5.3)
LYMPHOCYTES NFR BLD AUTO: 34.5 %
MCH RBC QN AUTO: 29.1 PG (ref 26.5–33)
MCHC RBC AUTO-ENTMCNC: 35.6 G/DL (ref 31.5–36.5)
MCV RBC AUTO: 82 FL (ref 78–100)
MONOCYTES # BLD AUTO: 0.6 10E9/L (ref 0–1.3)
MONOCYTES NFR BLD AUTO: 6.3 %
NEUTROPHILS # BLD AUTO: 4.8 10E9/L (ref 1.6–8.3)
NEUTROPHILS NFR BLD AUTO: 53.6 %
NITRATE UR QL: NEGATIVE
NRBC # BLD AUTO: 0 10*3/UL
NRBC BLD AUTO-RTO: 0 /100
PH UR STRIP: 6.5 PH (ref 5–7)
PLATELET # BLD AUTO: 218 10E9/L (ref 150–450)
POTASSIUM SERPL-SCNC: 3.8 MMOL/L (ref 3.4–5.3)
PROT SERPL-MCNC: 7.4 G/DL (ref 6.8–8.8)
RBC # BLD AUTO: 5.29 10E12/L (ref 4.4–5.9)
RBC #/AREA URNS AUTO: 1 /HPF (ref 0–2)
SODIUM SERPL-SCNC: 141 MMOL/L (ref 133–144)
SOURCE: NORMAL
SP GR UR STRIP: 1.03 (ref 1–1.03)
SPECIMEN EXP DATE BLD: NORMAL
UROBILINOGEN UR STRIP-MCNC: NORMAL MG/DL (ref 0–2)
WBC # BLD AUTO: 9 10E9/L (ref 4–11)
WBC #/AREA URNS AUTO: 1 /HPF (ref 0–5)

## 2020-02-11 PROCEDURE — 96361 HYDRATE IV INFUSION ADD-ON: CPT

## 2020-02-11 PROCEDURE — 96365 THER/PROPH/DIAG IV INF INIT: CPT | Mod: 59

## 2020-02-11 PROCEDURE — 25000128 H RX IP 250 OP 636: Performed by: PHYSICIAN ASSISTANT

## 2020-02-11 PROCEDURE — 96366 THER/PROPH/DIAG IV INF ADDON: CPT

## 2020-02-11 PROCEDURE — C9113 INJ PANTOPRAZOLE SODIUM, VIA: HCPCS | Performed by: HOSPITALIST

## 2020-02-11 PROCEDURE — 96375 TX/PRO/DX INJ NEW DRUG ADDON: CPT

## 2020-02-11 PROCEDURE — 80053 COMPREHEN METABOLIC PANEL: CPT | Performed by: EMERGENCY MEDICINE

## 2020-02-11 PROCEDURE — 85025 COMPLETE CBC W/AUTO DIFF WBC: CPT | Performed by: EMERGENCY MEDICINE

## 2020-02-11 PROCEDURE — 25000128 H RX IP 250 OP 636: Performed by: EMERGENCY MEDICINE

## 2020-02-11 PROCEDURE — 86901 BLOOD TYPING SEROLOGIC RH(D): CPT | Performed by: HOSPITALIST

## 2020-02-11 PROCEDURE — C9113 INJ PANTOPRAZOLE SODIUM, VIA: HCPCS | Performed by: EMERGENCY MEDICINE

## 2020-02-11 PROCEDURE — 83605 ASSAY OF LACTIC ACID: CPT | Performed by: EMERGENCY MEDICINE

## 2020-02-11 PROCEDURE — 12000000 ZZH R&B MED SURG/OB

## 2020-02-11 PROCEDURE — 36415 COLL VENOUS BLD VENIPUNCTURE: CPT | Performed by: HOSPITALIST

## 2020-02-11 PROCEDURE — 99222 1ST HOSP IP/OBS MODERATE 55: CPT | Performed by: SURGERY

## 2020-02-11 PROCEDURE — 25000128 H RX IP 250 OP 636: Performed by: HOSPITALIST

## 2020-02-11 PROCEDURE — 86850 RBC ANTIBODY SCREEN: CPT | Performed by: HOSPITALIST

## 2020-02-11 PROCEDURE — 99285 EMERGENCY DEPT VISIT HI MDM: CPT | Mod: 25

## 2020-02-11 PROCEDURE — 86900 BLOOD TYPING SEROLOGIC ABO: CPT | Performed by: HOSPITALIST

## 2020-02-11 PROCEDURE — 96376 TX/PRO/DX INJ SAME DRUG ADON: CPT

## 2020-02-11 PROCEDURE — 25800030 ZZH RX IP 258 OP 636: Performed by: HOSPITALIST

## 2020-02-11 PROCEDURE — 99223 1ST HOSP IP/OBS HIGH 75: CPT | Mod: AI | Performed by: HOSPITALIST

## 2020-02-11 PROCEDURE — 81001 URINALYSIS AUTO W/SCOPE: CPT | Performed by: EMERGENCY MEDICINE

## 2020-02-11 PROCEDURE — 25800030 ZZH RX IP 258 OP 636: Performed by: EMERGENCY MEDICINE

## 2020-02-11 PROCEDURE — 25000125 ZZHC RX 250: Performed by: EMERGENCY MEDICINE

## 2020-02-11 PROCEDURE — 85018 HEMOGLOBIN: CPT | Performed by: HOSPITALIST

## 2020-02-11 PROCEDURE — 74177 CT ABD & PELVIS W/CONTRAST: CPT

## 2020-02-11 PROCEDURE — 83690 ASSAY OF LIPASE: CPT | Performed by: EMERGENCY MEDICINE

## 2020-02-11 RX ORDER — HYDROMORPHONE HYDROCHLORIDE 1 MG/ML
.5-1 INJECTION, SOLUTION INTRAMUSCULAR; INTRAVENOUS; SUBCUTANEOUS
Status: DISCONTINUED | OUTPATIENT
Start: 2020-02-11 | End: 2020-02-12 | Stop reason: HOSPADM

## 2020-02-11 RX ORDER — HYDROMORPHONE HYDROCHLORIDE 1 MG/ML
.3-.5 INJECTION, SOLUTION INTRAMUSCULAR; INTRAVENOUS; SUBCUTANEOUS
Status: DISCONTINUED | OUTPATIENT
Start: 2020-02-11 | End: 2020-02-11

## 2020-02-11 RX ORDER — ONDANSETRON 2 MG/ML
4 INJECTION INTRAMUSCULAR; INTRAVENOUS EVERY 30 MIN PRN
Status: DISCONTINUED | OUTPATIENT
Start: 2020-02-11 | End: 2020-02-11

## 2020-02-11 RX ORDER — ONDANSETRON 4 MG/1
4 TABLET, ORALLY DISINTEGRATING ORAL EVERY 6 HOURS PRN
Status: DISCONTINUED | OUTPATIENT
Start: 2020-02-11 | End: 2020-02-12 | Stop reason: HOSPADM

## 2020-02-11 RX ORDER — PROCHLORPERAZINE MALEATE 5 MG
5 TABLET ORAL EVERY 6 HOURS PRN
Status: DISCONTINUED | OUTPATIENT
Start: 2020-02-11 | End: 2020-02-12 | Stop reason: HOSPADM

## 2020-02-11 RX ORDER — PROCHLORPERAZINE 25 MG
25 SUPPOSITORY, RECTAL RECTAL EVERY 12 HOURS PRN
Status: DISCONTINUED | OUTPATIENT
Start: 2020-02-11 | End: 2020-02-12 | Stop reason: HOSPADM

## 2020-02-11 RX ORDER — IOPAMIDOL 755 MG/ML
121 INJECTION, SOLUTION INTRAVASCULAR ONCE
Status: COMPLETED | OUTPATIENT
Start: 2020-02-11 | End: 2020-02-11

## 2020-02-11 RX ORDER — ONDANSETRON 2 MG/ML
4 INJECTION INTRAMUSCULAR; INTRAVENOUS EVERY 6 HOURS PRN
Status: DISCONTINUED | OUTPATIENT
Start: 2020-02-11 | End: 2020-02-12 | Stop reason: HOSPADM

## 2020-02-11 RX ORDER — NALOXONE HYDROCHLORIDE 0.4 MG/ML
.1-.4 INJECTION, SOLUTION INTRAMUSCULAR; INTRAVENOUS; SUBCUTANEOUS
Status: DISCONTINUED | OUTPATIENT
Start: 2020-02-11 | End: 2020-02-12 | Stop reason: HOSPADM

## 2020-02-11 RX ORDER — SODIUM CHLORIDE 9 MG/ML
INJECTION, SOLUTION INTRAVENOUS CONTINUOUS
Status: DISCONTINUED | OUTPATIENT
Start: 2020-02-11 | End: 2020-02-12

## 2020-02-11 RX ORDER — SODIUM CHLORIDE 9 MG/ML
1000 INJECTION, SOLUTION INTRAVENOUS CONTINUOUS
Status: DISCONTINUED | OUTPATIENT
Start: 2020-02-11 | End: 2020-02-11

## 2020-02-11 RX ORDER — HYDROMORPHONE HYDROCHLORIDE 1 MG/ML
0.5 INJECTION, SOLUTION INTRAMUSCULAR; INTRAVENOUS; SUBCUTANEOUS
Status: COMPLETED | OUTPATIENT
Start: 2020-02-11 | End: 2020-02-11

## 2020-02-11 RX ORDER — TOPIRAMATE 25 MG/1
50 TABLET, FILM COATED ORAL AT BEDTIME
COMMUNITY
End: 2020-07-18

## 2020-02-11 RX ORDER — LIDOCAINE 40 MG/G
CREAM TOPICAL
Status: DISCONTINUED | OUTPATIENT
Start: 2020-02-11 | End: 2020-02-12 | Stop reason: HOSPADM

## 2020-02-11 RX ORDER — ALBUTEROL SULFATE 90 UG/1
2 AEROSOL, METERED RESPIRATORY (INHALATION) EVERY 6 HOURS PRN
Status: DISCONTINUED | OUTPATIENT
Start: 2020-02-11 | End: 2020-02-12 | Stop reason: HOSPADM

## 2020-02-11 RX ORDER — DIPHENHYDRAMINE HCL 25 MG
25 CAPSULE ORAL EVERY 6 HOURS PRN
Status: DISCONTINUED | OUTPATIENT
Start: 2020-02-11 | End: 2020-02-12 | Stop reason: HOSPADM

## 2020-02-11 RX ORDER — DIPHENHYDRAMINE HYDROCHLORIDE 50 MG/ML
25 INJECTION INTRAMUSCULAR; INTRAVENOUS EVERY 6 HOURS PRN
Status: DISCONTINUED | OUTPATIENT
Start: 2020-02-11 | End: 2020-02-12 | Stop reason: HOSPADM

## 2020-02-11 RX ORDER — HYDROMORPHONE HYDROCHLORIDE 1 MG/ML
.5-1 INJECTION, SOLUTION INTRAMUSCULAR; INTRAVENOUS; SUBCUTANEOUS
Status: DISCONTINUED | OUTPATIENT
Start: 2020-02-11 | End: 2020-02-11

## 2020-02-11 RX ORDER — AZELASTINE 1 MG/ML
1-2 SPRAY, METERED NASAL 2 TIMES DAILY PRN
Status: DISCONTINUED | OUTPATIENT
Start: 2020-02-11 | End: 2020-02-12 | Stop reason: HOSPADM

## 2020-02-11 RX ADMIN — HYDROMORPHONE HYDROCHLORIDE 1 MG: 1 INJECTION, SOLUTION INTRAMUSCULAR; INTRAVENOUS; SUBCUTANEOUS at 10:26

## 2020-02-11 RX ADMIN — HYDROMORPHONE HYDROCHLORIDE 0.5 MG: 1 INJECTION, SOLUTION INTRAMUSCULAR; INTRAVENOUS; SUBCUTANEOUS at 14:00

## 2020-02-11 RX ADMIN — SODIUM CHLORIDE 77 ML: 9 INJECTION, SOLUTION INTRAVENOUS at 08:56

## 2020-02-11 RX ADMIN — PANTOPRAZOLE SODIUM 40 MG: 40 INJECTION, POWDER, FOR SOLUTION INTRAVENOUS at 20:27

## 2020-02-11 RX ADMIN — HYDROMORPHONE HYDROCHLORIDE 1 MG: 1 INJECTION, SOLUTION INTRAMUSCULAR; INTRAVENOUS; SUBCUTANEOUS at 12:18

## 2020-02-11 RX ADMIN — SODIUM CHLORIDE 80 MG: 9 INJECTION, SOLUTION INTRAVENOUS at 08:13

## 2020-02-11 RX ADMIN — DIPHENHYDRAMINE HYDROCHLORIDE 25 MG: 50 INJECTION, SOLUTION INTRAMUSCULAR; INTRAVENOUS at 16:31

## 2020-02-11 RX ADMIN — ONDANSETRON 4 MG: 2 INJECTION INTRAMUSCULAR; INTRAVENOUS at 17:08

## 2020-02-11 RX ADMIN — SODIUM CHLORIDE: 9 INJECTION, SOLUTION INTRAVENOUS at 13:57

## 2020-02-11 RX ADMIN — IOPAMIDOL 121 ML: 755 INJECTION, SOLUTION INTRAVENOUS at 08:55

## 2020-02-11 RX ADMIN — HYDROMORPHONE HYDROCHLORIDE 0.5 MG: 1 INJECTION, SOLUTION INTRAMUSCULAR; INTRAVENOUS; SUBCUTANEOUS at 17:28

## 2020-02-11 RX ADMIN — HYDROMORPHONE HYDROCHLORIDE 0.5 MG: 1 INJECTION, SOLUTION INTRAMUSCULAR; INTRAVENOUS; SUBCUTANEOUS at 07:21

## 2020-02-11 RX ADMIN — HYDROMORPHONE HYDROCHLORIDE 0.5 MG: 1 INJECTION, SOLUTION INTRAMUSCULAR; INTRAVENOUS; SUBCUTANEOUS at 07:47

## 2020-02-11 RX ADMIN — SODIUM CHLORIDE 8 MG/HR: 9 INJECTION, SOLUTION INTRAVENOUS at 09:06

## 2020-02-11 RX ADMIN — SODIUM CHLORIDE: 9 INJECTION, SOLUTION INTRAVENOUS at 21:14

## 2020-02-11 RX ADMIN — ONDANSETRON 4 MG: 2 INJECTION INTRAMUSCULAR; INTRAVENOUS at 07:21

## 2020-02-11 RX ADMIN — HYDROMORPHONE HYDROCHLORIDE 0.5 MG: 1 INJECTION, SOLUTION INTRAMUSCULAR; INTRAVENOUS; SUBCUTANEOUS at 16:00

## 2020-02-11 RX ADMIN — HYDROMORPHONE HYDROCHLORIDE 0.5 MG: 1 INJECTION, SOLUTION INTRAMUSCULAR; INTRAVENOUS; SUBCUTANEOUS at 08:18

## 2020-02-11 RX ADMIN — HYDROMORPHONE HYDROCHLORIDE 1 MG: 1 INJECTION, SOLUTION INTRAMUSCULAR; INTRAVENOUS; SUBCUTANEOUS at 20:21

## 2020-02-11 RX ADMIN — SODIUM CHLORIDE 1000 ML: 9 INJECTION, SOLUTION INTRAVENOUS at 12:18

## 2020-02-11 RX ADMIN — SODIUM CHLORIDE 1000 ML: 9 INJECTION, SOLUTION INTRAVENOUS at 07:22

## 2020-02-11 RX ADMIN — PROCHLORPERAZINE EDISYLATE 5 MG: 5 INJECTION INTRAMUSCULAR; INTRAVENOUS at 21:11

## 2020-02-11 ASSESSMENT — ENCOUNTER SYMPTOMS
BLOOD IN STOOL: 1
DIFFICULTY URINATING: 0
NAUSEA: 1
DYSURIA: 0
FEVER: 0
FREQUENCY: 0
VOMITING: 0
SHORTNESS OF BREATH: 1
CHILLS: 0
ABDOMINAL PAIN: 1

## 2020-02-11 ASSESSMENT — ACTIVITIES OF DAILY LIVING (ADL)
NUMBER_OF_TIMES_PATIENT_HAS_FALLEN_WITHIN_LAST_SIX_MONTHS: 2
DRESS: 0-->INDEPENDENT
RETIRED_EATING: 0-->INDEPENDENT
ADLS_ACUITY_SCORE: 12
FALL_HISTORY_WITHIN_LAST_SIX_MONTHS: YES
WHICH_OF_THE_ABOVE_FUNCTIONAL_RISKS_HAD_A_RECENT_ONSET_OR_CHANGE?: AMBULATION;TRANSFERRING
ADLS_ACUITY_SCORE: 12
COGNITION: 0 - NO COGNITION ISSUES REPORTED
TOILETING: 0-->INDEPENDENT
AMBULATION: 0-->INDEPENDENT
SWALLOWING: 0-->SWALLOWS FOODS/LIQUIDS WITHOUT DIFFICULTY
RETIRED_COMMUNICATION: 0-->UNDERSTANDS/COMMUNICATES WITHOUT DIFFICULTY
BATHING: 0-->INDEPENDENT
TRANSFERRING: 0-->INDEPENDENT

## 2020-02-11 ASSESSMENT — MIFFLIN-ST. JEOR: SCORE: 2035.76

## 2020-02-11 NOTE — ED NOTES
[facility]  ED Nurse Handoff Report    ED Chief complaint: Abdominal Pain      ED Diagnosis:   Final diagnoses:   None       Code Status: Full Code    Allergies:   Allergies   Allergen Reactions     Cat Hair Extract Itching     eye lids may swell     Fructose GI Disturbance     Lactose GI Disturbance     Stomach cramps; bloating; and gas     Morphine Itching     Trazodone Other (See Comments)     Muscle spasms       Patient Story: Pt presents to the ER with c/o mid abd pain with black stools that started 1 hr prior to arrival. Pt has a hx of SBO and state this is what it felt like.    Focused Assessment: Dilaudid given for abd pain, Zofran for nausea, IVF started and Protonix bolus and gtt started. Pt Is A&Ox4 and up independently.   Results for orders placed or performed during the hospital encounter of 02/11/20   CT Abdomen Pelvis w Contrast     Status: None (Preliminary result)    Narrative    CT ABDOMEN AND PELVIS WITH CONTRAST   2/11/2020 8:56 AM     HISTORY: Abdominal pain.    TECHNIQUE: 121 mL Isovue-370 IV were administered. After contrast  administration, volumetric helical sections were acquired from the  lung bases to the ischial tuberosities. Coronal images were also  reconstructed. Radiation dose for this scan was reduced using  automated exposure control, adjustment of the mA and/or kV according  to patient size, or iterative reconstruction technique.    COMPARISON: CT of the abdomen and pelvis performed 12/18/2019.     FINDINGS: There are several dilated loops of mid small bowel in the  lower abdomen anteriorly, with a transition point noted at small bowel  postoperative changes in the left lower quadrant (series 5 image 23;  series 3 image 164). Findings are consistent with a mid small bowel  obstruction. There is mild associated mesenteric fluid and edema.  Multiple decompressed loops of distal small bowel are noted in the  right lower quadrant. The colon is largely decompressed.      Trace amount of free fluid in the pelvis is likely related to the  bowel obstruction. No intra-abdominal fluid collections. No free  intraperitoneal air. There is diffuse fatty infiltration of the liver.  The liver, gallbladder, spleen, adrenal glands, pancreas, and kidneys  are otherwise unremarkable. No hydronephrosis. Small hiatal hernia.  The visualized lung bases are clear.      Impression    IMPRESSION:   1. There is a mid small bowel obstruction, with a transition point at  small bowel postoperative changes in the left lower quadrant.  2.  Diffuse fatty infiltration of the liver.    3. Trace amount of free fluid in the pelvis is nonspecific, but likely  related to the small bowel obstruction.   CBC with platelets differential     Status: None   Result Value Ref Range    WBC 9.0 4.0 - 11.0 10e9/L    RBC Count 5.29 4.4 - 5.9 10e12/L    Hemoglobin 15.4 13.3 - 17.7 g/dL    Hematocrit 43.3 40.0 - 53.0 %    MCV 82 78 - 100 fl    MCH 29.1 26.5 - 33.0 pg    MCHC 35.6 31.5 - 36.5 g/dL    RDW 13.6 10.0 - 15.0 %    Platelet Count 218 150 - 450 10e9/L    Diff Method Automated Method     % Neutrophils 53.6 %    % Lymphocytes 34.5 %    % Monocytes 6.3 %    % Eosinophils 4.8 %    % Basophils 0.6 %    % Immature Granulocytes 0.2 %    Nucleated RBCs 0 0 /100    Absolute Neutrophil 4.8 1.6 - 8.3 10e9/L    Absolute Lymphocytes 3.1 0.8 - 5.3 10e9/L    Absolute Monocytes 0.6 0.0 - 1.3 10e9/L    Absolute Eosinophils 0.4 0.0 - 0.7 10e9/L    Absolute Basophils 0.1 0.0 - 0.2 10e9/L    Abs Immature Granulocytes 0.0 0 - 0.4 10e9/L    Absolute Nucleated RBC 0.0    Lipase     Status: None   Result Value Ref Range    Lipase 180 73 - 393 U/L   Comprehensive metabolic panel     Status: Abnormal   Result Value Ref Range    Sodium 141 133 - 144 mmol/L    Potassium 3.8 3.4 - 5.3 mmol/L    Chloride 114 (H) 94 - 109 mmol/L    Carbon Dioxide 20 20 - 32 mmol/L    Anion Gap 7 3 - 14 mmol/L    Glucose 119 (H) 70 - 99 mg/dL    Urea Nitrogen 17 7  - 30 mg/dL    Creatinine 1.05 0.66 - 1.25 mg/dL    GFR Estimate >90 >60 mL/min/[1.73_m2]    GFR Estimate If Black >90 >60 mL/min/[1.73_m2]    Calcium 9.1 8.5 - 10.1 mg/dL    Bilirubin Total 0.6 0.2 - 1.3 mg/dL    Albumin 4.2 3.4 - 5.0 g/dL    Protein Total 7.4 6.8 - 8.8 g/dL    Alkaline Phosphatase 72 40 - 150 U/L    ALT 63 0 - 70 U/L    AST 29 0 - 45 U/L   UA with Microscopic     Status: None   Result Value Ref Range    Color Urine Light Yellow     Appearance Urine Clear     Glucose Urine Negative NEG^Negative mg/dL    Bilirubin Urine Negative NEG^Negative    Ketones Urine Negative NEG^Negative mg/dL    Specific Gravity Urine 1.033 1.003 - 1.035    Blood Urine Negative NEG^Negative    pH Urine 6.5 5.0 - 7.0 pH    Protein Albumin Urine Negative NEG^Negative mg/dL    Urobilinogen mg/dL Normal 0.0 - 2.0 mg/dL    Nitrite Urine Negative NEG^Negative    Leukocyte Esterase Urine Negative NEG^Negative    Source Midstream Urine     WBC Urine 1 0 - 5 /HPF    RBC Urine 1 0 - 2 /HPF   Lactic acid whole blood     Status: None   Result Value Ref Range    Lactic Acid 1.4 0.7 - 2.0 mmol/L         Treatments and/or interventions provided: Pain management with dilaudid.    Patient's response to treatments and/or interventions: Pain constant but increases with movement.     To be done/followed up on inpatient unit:  Monitor and GI consult     Does this patient have any cognitive concerns?: none    Activity level - Baseline/Home:  Independent  Activity Level - Current:   Independent    Patient's Preferred language: English   Needed?: No    Isolation: None  Infection: Not Applicable  Bariatric?: No    Vital Signs:   Vitals:    02/11/20 0748 02/11/20 0800 02/11/20 0815 02/11/20 0906   BP: 125/77 133/65  123/84   Pulse: 68 64  63   Resp:       Temp:       TempSrc:       SpO2: 98% 97% 97% 97%   Weight:       Height:           Cardiac Rhythm:     Was the PSS-3 completed:   Yes  What interventions are required if any?                Family Comments: alone  OBS brochure/video discussed/provided to patient/family: N/A              Name of person given brochure if not patient:               Relationship to patient:     For the majority of the shift this patient's behavior was Green.   Behavioral interventions performed were .    ED NURSE PHONE NUMBER: [id number]

## 2020-02-11 NOTE — ED PROVIDER NOTES
History     Chief Complaint:  Abdominal Pain    HPI   Cash Cunningham is a 39 year old male with a history of small bowel obstruction with associated surgeries who presents for evaluation of abdominal pain. This morning, the patient awoke to centralized abdominal pain, just above his umbilicus. He reports this is the same location as prior bowel obstructions and it feels similar to these pains. Given this similarity, the patient decided to present to the ED. While at home, he does report having a bowel movement at home which was black and pasty in character; he has no history of black stools and is not currently taking Pepto Bismol or iron supplements. Currently, the pain is at a 9-10/10 and does not radiate away from his central abdomen. He also reports increased dyspnea and nausea, but denies any vomiting, fevers, chills, or urinary symptoms. He also denies any chest pain or lower extremity swelling. He was last seen for a small bowel obstruction in November 2019, and reports undergoing an EGD in December 2019 on which three esophageal ulcers were found. Also in December, he reports having two anal fistula removed.     Allergies:  Morphine  Trazodone    Medications:    Albuterol inhaler  Prilosec  Requip     Past Medical History:    Hiatal hernia  Anxiety & Depression  Restless leg syndrome  Small bowel obstruction   Kidney stone     Past Surgical History:    EGD  Colonoscopy  Anal fistula removal x2  Small bowel resection  T&A  Small bowel obstruction surgery, diverticulum removal   Knee arthroscopy   Liposuction     Family History:    Hyperlipidemia   Hypertension  Anxiety   Arthritis  Migraines     Social History:  Marital Status:   [2]  Presents to the ED unaccompanied.   Former smoker. Quit 2018.   Positive for alcohol use. Comment: 1 drink/month.   Negative for drug use.      Review of Systems   Constitutional: Negative for chills and fever.   Respiratory: Positive for shortness of breath.   "  Cardiovascular: Negative for chest pain and leg swelling.   Gastrointestinal: Positive for abdominal pain, blood in stool (black stool) and nausea. Negative for vomiting.   Genitourinary: Negative for difficulty urinating, dysuria and frequency.   All other systems reviewed and are negative.    Physical Exam     Patient Vitals for the past 24 hrs:   BP Temp Temp src Pulse Heart Rate Resp SpO2 Height Weight   02/11/20 0906 123/84 -- -- 63 -- -- 97 % -- --   02/11/20 0815 -- -- -- -- -- -- 97 % -- --   02/11/20 0800 133/65 -- -- 64 -- -- 97 % -- --   02/11/20 0748 125/77 -- -- 68 -- -- 98 % -- --   02/11/20 0730 (!) 138/93 -- -- 84 -- -- 97 % -- --   02/11/20 0726 (!) 126/93 -- -- 82 -- -- 97 % -- --   02/11/20 0721 (!) 129/112 -- -- 79 -- -- -- -- --   02/11/20 0702 (!) 171/124 98.6  F (37  C) Temporal -- 96 18 100 % 1.803 m (5' 11\") 108.9 kg (240 lb)      Physical Exam  Constitutional: Well developed, nontox appearance  Head: Atraumatic.   Mouth/Throat: Oropharynx is clear and moist.   Neck:  no stridor  Eyes: no scleral icterus  Cardiovascular: RRR, 2+ bilat radial, DP pulses  Pulmonary/Chest: nml resp effort, Clear BS bilat  Abdominal: ND, +BS, soft, diffuse tenderness, no rebound or involuntary guarding   Recta: DREW w/ no stool in rectal vault  Ext: Warm, well perfused, no edema  Neurological: A&O, symmetric facies, moves ext x4  Skin: Skin is warm and dry.   Psychiatric: Behavior is normal. Thought content normal.   Nursing note and vitals reviewed.    Emergency Department Course   Imaging:  Radiographic findings were communicated with the patient who voiced understanding of the findings.  CT Abdomen/Pelvis with IV contrast:   1. There is a mid small bowel obstruction, with a transition point at  small bowel postoperative changes in the left lower quadrant.  2.  Diffuse fatty infiltration of the liver.    3. Trace amount of free fluid in the pelvis is nonspecific, but likely  related to the small bowel " obstruction, as per radiology.    Laboratory:  CBC: WBC: 9.0, HGB: 15.4, PLT: 218  CMP: Glucose 119 (H), Cl: 114 (H), o/w WNL (Creatinine: 1.05)  0721 Lactic acid: 1.4  Lipase: 180    UA with Microscopic: All WNL     Procedures:  None    Interventions:  0721 Zofran, 4 mg, IV injection    Dilaudid, 0.5 mg, IV injection   0722 NS 1L IV  0747 Dilaudid, 0.5 mg, IV injection   0813 Protonix, 80 mg, IV infusion   0818 Dilaudid, 0.5 mg, IV injection   0906 Protonix, 8 mg/hr, IV infusion   1026 Dilaudid, 0.5 mg, IV injection     Emergency Department Course:  Nursing notes and vitals reviewed.   0728: I performed an exam of the patient as documented above. I also performed a chaperoned rectal exam, as documented above however there was no stool in the rectal vault.      IV was inserted and blood was drawn for laboratory testing, results above. Medicine administered as documented above.   The patient was sent for a CT abdomen/pelvis while in the emergency department, results above.    The patient provided a urine sample here in the emergency department. This was sent for laboratory testing, findings above.     0949: I rechecked the patient and discussed the results of his workup thus far.     1005: I consulted with Dr. Chamorro of the hospitalist services. He is in agreement to accept the patient for admission.    Findings and plan explained to the Patient who consents to admission. Discussed the patient with Dr. Chamorro, who will admit the patient to a medical bed for further monitoring, evaluation, and treatment.    I personally reviewed the laboratory and imaging results with the Patient and answered all related questions prior to admission.    Impression & Plan      Medical Decision Makin year old male presenting w/ abdominal pain, black stool     DDx includes abdominal pain NOS, upper GI bleed, gastritis, gastric ulcer, esophageal ulcer, early obstruction, hepatitis, pancreatitis.  Doubt acute vascular catastrophe  given history of previous episodes, risk factors.  Labs significant for normal hemoglobin level, normal lactate, nml Cr.  Imaging sig for evidence of small bowel obstruction.  Interventions as noted above with moderate improvement in symptoms. Given bowel obstruction but no evidence of ischemia, pt admitted to hospitalist service.  I do not think emergent surgical consultation is indicated at this time.  Pt counseled on all results, disposition and diagnosis.  They are understanding and agreeable to plan. Patient admitted in stable condition.      Diagnosis:    ICD-10-CM    1. Small bowel obstruction (H) K56.609        Disposition:  Admitted to medicine under the care of Dr. Pedro Pablo Moreno Disclosure:  I, Chelsi Joanna, am serving as a scribe on 2/11/2020 at 7:28 AM to personally document services performed by Gume Fisher MD based on my observations and the provider's statements to me.      2/11/2020    EMERGENCY DEPARTMENT       Gume Fisher MD  02/12/20 0023

## 2020-02-11 NOTE — PHARMACY-ADMISSION MEDICATION HISTORY
Pharmacy Medication History  Admission medication history interview status for the 2/11/2020  admission is complete. See EPIC admission navigator for prior to admission medications     Medication history sources: Patient, Surescripts and Pharmacy ([facility] in [place])  Medication history source reliability: Good  Adherence assessment: Good      Medication reconciliation completed by provider prior to medication history? No    Time spent in this activity: 15 minutes      Prior to Admission medications    Medication Sig Last Dose Taking? Auth Provider   albuterol (PROAIR HFA/PROVENTIL HFA/VENTOLIN HFA) 108 (90 Base) MCG/ACT inhaler Inhale 2 puffs into the lungs every 6 hours as needed for shortness of breath / dyspnea or wheezing prn Yes Unknown, Entered By History   amitriptyline (ELAVIL) 25 MG tablet Take 25 mg by mouth At Bedtime 2/10/2020 at Unknown time Yes Unknown, Entered By History   azelastine (ASTELIN) 0.1 % nasal spray Spray 1-2 sprays into both nostrils 2 times daily as needed  prn Yes Unknown, Entered By History   ketotifen (ZADITOR/REFRESH ANTI-ITCH) 0.025 % ophthalmic solution Place 1 drop into both eyes 2 times daily as needed  prn Yes Unknown, Entered By History   MAGNESIUM PO Take 1 tablet by mouth daily  Yes Unknown, Entered By History   omeprazole (PRILOSEC) 40 MG DR capsule Take 1 capsule (40 mg) by mouth daily Take one hour before breakfast 2/10/2020 at Unknown time Yes Jeremy Villareal MD   rOPINIRole (REQUIP) 1 MG tablet Take 1 mg by mouth At Bedtime 2/10/2020 at Unknown time Yes Unknown, Entered By History   topiramate (TOPAMAX) 25 MG tablet Take 50 mg by mouth At Bedtime 2/10/2020 at Unknown time Yes Unknown, Entered By History   VITAMIN D PO Take 2 tablets by mouth daily 2/10/2020 at Unknown time Yes Unknown, Entered By History

## 2020-02-11 NOTE — CONSULTS
"[facility] General Surgery Consultation    Cash Cunningham MRN# [id number]   YOB: 1980 Age: 39 year old      Date of Admission:  2/11/2020  Date of Consult: 2/11/2020         Assessment and Plan:   Patient is a 39 year old male with small bowel obstruction    PLAN:  - Medical management per primary team  - NPO and IVF's  - Pain control and anti-emetics prn  - No indication for emergent surgical intervention  - Surgery to follow         Requesting Physician:              Chief Complaint:     Chief Complaint   Patient presents with     Abdominal Pain          History of Present Illness:   Cash Cunningham is a 39 year old male who was seen in consultation at the request of  who presented with abdominal pain.  The patient reports acute onset of abdominal pain starting this morning.  The pain is located in the periumbilical area and is sharp in nature.  He has had associated nausea but no vomiting.  No fevers or chills.  Patient's last bowel movement was this morning.  He reports he is no longer passing any flatus.  Patient does report eating cooked cabbage last night for dinner.  Past abdominal surgical history is significant for exploratory laparotomy with small bowel resection for Meckel's diverticulum with associated small bowel obstruction in 2010.  Since this time, the patient has had more than a dozen episodes of small bowel obstruction.  Possible repeat surgery for lysis of adhesions has been discussed and the patient continues to contemplate this.  The patient reports that his pain associated with bowel obstructions is in the same spot every time.  Based on CT scan, the pain/area of obstruction is located at his previous small bowel anastomosis.  The patient states his last bowel obstruction was in November 2019.          Physical Exam:   Blood pressure 116/69, pulse 62, temperature 98.6  F (37  C), temperature source Temporal, resp. rate 16, height 1.803 m (5' 11\"), " weight 108.9 kg (240 lb), SpO2 96 %.  240 lbs 0 oz  General: Vital signs reviewed, in no apparent distress  Eyes: Anicteric  HENT: Normocephalic, atraumatic, trachea midline   Respiratory: Breathing nonlabored  Cardiovascular: Regular rate and rhythm  GI: Abdomen soft, slightly distended, mildly tender with palpation in the periumbilical area  Musculoskeletal: No gross deformities  Neurologic: Grossly nonfocal exam  Psychiatric: Normal mood, affect and insight  Integumentary: Warm and dry         Past Medical History:     Past Medical History:   Diagnosis Date     Depressive disorder     early 20's     Hiatal hernia      Left groin hernia      Restless leg syndrome     uses Requip     Small bowel obstruction (H) 2019            Past Surgical History:     Past Surgical History:   Procedure Laterality Date     COLONOSCOPY       ESOPHAGOSCOPY, GASTROSCOPY, DUODENOSCOPY (EGD), COMBINED N/A 12/31/2019    Procedure: ESOPHAGOGASTRODUODENOSCOPY, WITH BIOPSY;  Surgeon: Jeremy Villareal MD;  Location: [facility]     REMOVE FISTULA ANAL      x2     SMALL BOWEL RESECTION  2010     TONSILLECTOMY & ADENOIDECTOMY              Current Medications:           sodium chloride (PF)  3 mL Intracatheter Q8H       albuterol, azelastine, HYDROmorphone, HYDROmorphone, ketotifen, lidocaine 4%, lidocaine (buffered or not buffered), melatonin, naloxone, ondansetron, ondansetron **OR** ondansetron, sodium chloride (PF)         Home Medications:     Prior to Admission medications    Medication Sig Last Dose Taking? Auth Provider   albuterol (PROAIR HFA/PROVENTIL HFA/VENTOLIN HFA) 108 (90 Base) MCG/ACT inhaler Inhale 2 puffs into the lungs every 6 hours as needed for shortness of breath / dyspnea or wheezing prn Yes Unknown, Entered By History   amitriptyline (ELAVIL) 25 MG tablet Take 25 mg by mouth At Bedtime 2/10/2020 at Unknown time Yes Unknown, Entered By History   azelastine (ASTELIN) 0.1 % nasal spray Spray 1-2 sprays into both nostrils 2 times  daily as needed  prn Yes Unknown, Entered By History   ketotifen (ZADITOR/REFRESH ANTI-ITCH) 0.025 % ophthalmic solution Place 1 drop into both eyes 2 times daily as needed  prn Yes Unknown, Entered By History   MAGNESIUM PO Take 1 tablet by mouth daily  Yes Unknown, Entered By History   omeprazole (PRILOSEC) 40 MG DR capsule Take 1 capsule (40 mg) by mouth daily Take one hour before breakfast 2/10/2020 at Unknown time Yes Jeremy Villareal MD   rOPINIRole (REQUIP) 1 MG tablet Take 1 mg by mouth At Bedtime 2/10/2020 at Unknown time Yes Unknown, Entered By History   topiramate (TOPAMAX) 25 MG tablet Take 50 mg by mouth At Bedtime 2/10/2020 at Unknown time Yes Unknown, Entered By History   VITAMIN D PO Take 2 tablets by mouth daily 2/10/2020 at Unknown time Yes Unknown, Entered By History            Allergies:     Allergies   Allergen Reactions     Cat Hair Extract Itching     eye lids may swell     Fructose GI Disturbance     Lactose GI Disturbance     Stomach cramps; bloating; and gas     Morphine Itching     Trazodone Other (See Comments)     Muscle spasms            Family History:   History reviewed. No pertinent family history.        Social History:   Cash Cunningham  reports that he has never smoked. He has never used smokeless tobacco. He reports current alcohol use. He reports that he does not use drugs.          Review of Systems:   Constitutional: No fevers or chills  Eyes: Blurred vision  HENT: Chronic headaches, reports rhinorrhea, No sore throat  Respiratory: Dry cough  Cardiovascular: Denies chest pain or palpitations  Gastrointestinal: Abdominal pain and nausea  Genitourinary: No hematuria or dysuria  Musculoskeletal: Denies arthralgias or myalgias  Neurologic: Numbness/tingling in hands and feet  Integumentary: No skin rashes         Labs/Imaging   All new lab and imaging data was reviewed.   Recent Labs   Lab 02/11/20  0709   WBC 9.0   HGB 15.4   HCT 43.3   MCV 82        Recent Labs   Lab  02/11/20  0709      POTASSIUM 3.8   CHLORIDE 114*   CO2 20   ANIONGAP 7   *   BUN 17   CR 1.05   GFRESTIMATED >90   GFRESTBLACK >90   ANGELLA 9.1   PROTTOTAL 7.4   ALBUMIN 4.2   BILITOTAL 0.6   ALKPHOS 72   AST 29   ALT 63     Recent Labs   Lab 02/11/20  0709   LACT 1.4       I have personally reviewed the imaging studies-   CT ABDOMEN AND PELVIS WITH CONTRAST   2/11/2020 8:56 AM      HISTORY: Abdominal pain.     TECHNIQUE: 121 mL Isovue-370 IV were administered. After contrast  administration, volumetric helical sections were acquired from the  lung bases to the ischial tuberosities. Coronal images were also  reconstructed. Radiation dose for this scan was reduced using  automated exposure control, adjustment of the mA and/or kV according  to patient size, or iterative reconstruction technique.     COMPARISON: CT of the abdomen and pelvis performed 12/18/2019.      FINDINGS: There are several dilated loops of mid small bowel in the  lower abdomen anteriorly, with a transition point noted at small bowel  postoperative changes in the left lower quadrant (series 5 image 23;  series 3 image 164). Findings are consistent with a mid small bowel  obstruction. There is mild associated mesenteric fluid and edema.  Multiple decompressed loops of distal small bowel are noted in the  right lower quadrant. The colon is largely decompressed.      Trace amount of free fluid in the pelvis is likely related to the  bowel obstruction. No intra-abdominal fluid collections. No free  intraperitoneal air. There is diffuse fatty infiltration of the liver.  The liver, gallbladder, spleen, adrenal glands, pancreas, and kidneys  are otherwise unremarkable. No hydronephrosis. Small hiatal hernia.  The visualized lung bases are clear.                                                                      IMPRESSION:   1. There is a mid small bowel obstruction, with a transition point at  small bowel postoperative changes in the left  lower quadrant.  2.  Diffuse fatty infiltration of the liver.    3. Trace amount of free fluid in the pelvis is nonspecific, but likely  related to the small bowel obstruction.         Rashmi Enriquez MD

## 2020-02-11 NOTE — PLAN OF CARE
RECEIVING UNIT ED HANDOFF REVIEW    ED Nurse Handoff Report was reviewed by: Jasmyn Perez RN on February 11, 2020 at 12:26 PM

## 2020-02-11 NOTE — CONSULTS
Consult received.  Chart reviewed.  H/O PUD with melena and partial SBO  NPO  I/V protonix.  Will see patient today.    [facility]

## 2020-02-11 NOTE — ED TRIAGE NOTES
Abdominal pain for 1 hour. Sharp pain with sudden onset, nausea  With no vomiting yet. Hx of obstruction
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
## 2020-02-11 NOTE — PROVIDER NOTIFICATION
Prescriber Notification Note    The pharmacist has communicated with this patient's provider regarding a concern or therapy recommendation.    Notified Person: MALDONADO Chamorro  Date/Time of Notification: 2/11/20 8765  Interaction: text page  Concern/Recommendation:protonix drip active from ED. Need it authorized by admitting provider.     Comments/Additional Details:changed to protonix IVP.

## 2020-02-12 VITALS
DIASTOLIC BLOOD PRESSURE: 63 MMHG | WEIGHT: 240 LBS | HEART RATE: 80 BPM | OXYGEN SATURATION: 95 % | RESPIRATION RATE: 16 BRPM | TEMPERATURE: 97.4 F | HEIGHT: 71 IN | BODY MASS INDEX: 33.6 KG/M2 | SYSTOLIC BLOOD PRESSURE: 112 MMHG

## 2020-02-12 LAB
ANION GAP SERPL CALCULATED.3IONS-SCNC: 6 MMOL/L (ref 3–14)
BUN SERPL-MCNC: 13 MG/DL (ref 7–30)
CALCIUM SERPL-MCNC: 8.1 MG/DL (ref 8.5–10.1)
CHLORIDE SERPL-SCNC: 113 MMOL/L (ref 94–109)
CO2 SERPL-SCNC: 20 MMOL/L (ref 20–32)
CREAT SERPL-MCNC: 1.11 MG/DL (ref 0.66–1.25)
ERYTHROCYTE [DISTWIDTH] IN BLOOD BY AUTOMATED COUNT: 13.4 % (ref 10–15)
GFR SERPL CREATININE-BSD FRML MDRD: 84 ML/MIN/{1.73_M2}
GLUCOSE SERPL-MCNC: 91 MG/DL (ref 70–99)
HCT VFR BLD AUTO: 38.8 % (ref 40–53)
HGB BLD-MCNC: 13.8 G/DL (ref 13.3–17.7)
MCH RBC QN AUTO: 29.2 PG (ref 26.5–33)
MCHC RBC AUTO-ENTMCNC: 35.6 G/DL (ref 31.5–36.5)
MCV RBC AUTO: 82 FL (ref 78–100)
PLATELET # BLD AUTO: 182 10E9/L (ref 150–450)
POTASSIUM SERPL-SCNC: 3.6 MMOL/L (ref 3.4–5.3)
RBC # BLD AUTO: 4.72 10E12/L (ref 4.4–5.9)
SODIUM SERPL-SCNC: 139 MMOL/L (ref 133–144)
UPPER GI ENDOSCOPY: NORMAL
WBC # BLD AUTO: 10.2 10E9/L (ref 4–11)

## 2020-02-12 PROCEDURE — G0500 MOD SEDAT ENDO SERVICE >5YRS: HCPCS | Performed by: INTERNAL MEDICINE

## 2020-02-12 PROCEDURE — 0DJ08ZZ INSPECTION OF UPPER INTESTINAL TRACT, VIA NATURAL OR ARTIFICIAL OPENING ENDOSCOPIC: ICD-10-PCS | Performed by: INTERNAL MEDICINE

## 2020-02-12 PROCEDURE — 80048 BASIC METABOLIC PNL TOTAL CA: CPT | Performed by: HOSPITALIST

## 2020-02-12 PROCEDURE — 25000125 ZZHC RX 250: Performed by: INTERNAL MEDICINE

## 2020-02-12 PROCEDURE — 25000128 H RX IP 250 OP 636: Performed by: HOSPITALIST

## 2020-02-12 PROCEDURE — 25800030 ZZH RX IP 258 OP 636: Performed by: HOSPITALIST

## 2020-02-12 PROCEDURE — 43235 EGD DIAGNOSTIC BRUSH WASH: CPT | Performed by: INTERNAL MEDICINE

## 2020-02-12 PROCEDURE — 85027 COMPLETE CBC AUTOMATED: CPT | Performed by: HOSPITALIST

## 2020-02-12 PROCEDURE — 25000128 H RX IP 250 OP 636: Performed by: INTERNAL MEDICINE

## 2020-02-12 PROCEDURE — C9113 INJ PANTOPRAZOLE SODIUM, VIA: HCPCS | Performed by: HOSPITALIST

## 2020-02-12 PROCEDURE — 99231 SBSQ HOSP IP/OBS SF/LOW 25: CPT | Performed by: SURGERY

## 2020-02-12 PROCEDURE — 25000128 H RX IP 250 OP 636: Performed by: PHYSICIAN ASSISTANT

## 2020-02-12 PROCEDURE — 99239 HOSP IP/OBS DSCHRG MGMT >30: CPT | Performed by: HOSPITALIST

## 2020-02-12 PROCEDURE — 36415 COLL VENOUS BLD VENIPUNCTURE: CPT | Performed by: HOSPITALIST

## 2020-02-12 PROCEDURE — 25000132 ZZH RX MED GY IP 250 OP 250 PS 637: Performed by: HOSPITALIST

## 2020-02-12 RX ORDER — TOPIRAMATE 50 MG/1
50 TABLET, FILM COATED ORAL AT BEDTIME
Status: DISCONTINUED | OUTPATIENT
Start: 2020-02-12 | End: 2020-02-12 | Stop reason: HOSPADM

## 2020-02-12 RX ORDER — ROPINIROLE 1 MG/1
1 TABLET, FILM COATED ORAL AT BEDTIME
Status: DISCONTINUED | OUTPATIENT
Start: 2020-02-12 | End: 2020-02-12 | Stop reason: HOSPADM

## 2020-02-12 RX ORDER — PANTOPRAZOLE SODIUM 40 MG/1
40 TABLET, DELAYED RELEASE ORAL
Status: DISCONTINUED | OUTPATIENT
Start: 2020-02-13 | End: 2020-02-12 | Stop reason: HOSPADM

## 2020-02-12 RX ORDER — ACETAMINOPHEN 500 MG
1000 TABLET ORAL
Status: COMPLETED | OUTPATIENT
Start: 2020-02-12 | End: 2020-02-12

## 2020-02-12 RX ORDER — FENTANYL CITRATE 50 UG/ML
INJECTION, SOLUTION INTRAMUSCULAR; INTRAVENOUS PRN
Status: DISCONTINUED | OUTPATIENT
Start: 2020-02-12 | End: 2020-02-12 | Stop reason: HOSPADM

## 2020-02-12 RX ADMIN — PROCHLORPERAZINE EDISYLATE 5 MG: 5 INJECTION INTRAMUSCULAR; INTRAVENOUS at 05:48

## 2020-02-12 RX ADMIN — SODIUM CHLORIDE: 9 INJECTION, SOLUTION INTRAVENOUS at 05:53

## 2020-02-12 RX ADMIN — HYDROMORPHONE HYDROCHLORIDE 1 MG: 1 INJECTION, SOLUTION INTRAMUSCULAR; INTRAVENOUS; SUBCUTANEOUS at 01:45

## 2020-02-12 RX ADMIN — HYDROMORPHONE HYDROCHLORIDE 1 MG: 1 INJECTION, SOLUTION INTRAMUSCULAR; INTRAVENOUS; SUBCUTANEOUS at 05:48

## 2020-02-12 RX ADMIN — ONDANSETRON 4 MG: 2 INJECTION INTRAMUSCULAR; INTRAVENOUS at 01:45

## 2020-02-12 RX ADMIN — ACETAMINOPHEN 1000 MG: 500 TABLET, FILM COATED ORAL at 09:31

## 2020-02-12 RX ADMIN — PANTOPRAZOLE SODIUM 40 MG: 40 INJECTION, POWDER, FOR SOLUTION INTRAVENOUS at 09:19

## 2020-02-12 RX ADMIN — SODIUM CHLORIDE: 9 INJECTION, SOLUTION INTRAVENOUS at 14:05

## 2020-02-12 ASSESSMENT — ACTIVITIES OF DAILY LIVING (ADL)
ADLS_ACUITY_SCORE: 12
ADLS_ACUITY_SCORE: 14

## 2020-02-12 NOTE — PROGRESS NOTES
"SURGERY ACUTE CARE PROGRESS NOTE    Subjective:  No acute overnight events, VSS, afebrile, satting at 95% on RA. Patient reports normal-appearing BM overnight, denies melena or hematochezia. Passing a lot of gas, feels much less distended. Free of pain today, no nausea. Voiding well. Has been up walking some. Would like to be off tele to make movement easier, anticipating another BM.     Objective:    Intake/Output Summary (Last 24 hours) at 2/12/2020 0818  Last data filed at 2/12/2020 0612  Gross per 24 hour   Intake 1261 ml   Output --   Net 1261 ml     Labs:  ROUTINE IP LABS (Last four results)  BMP  Recent Labs   Lab 02/11/20  0709      POTASSIUM 3.8   CHLORIDE 114*   ANGELLA 9.1   CO2 20   BUN 17   CR 1.05   *     CBC  Recent Labs   Lab 02/11/20  1504 02/11/20  0709   WBC  --  9.0   RBC  --  5.29   HGB 14.3 15.4   HCT  --  43.3   MCV  --  82   MCH  --  29.1   MCHC  --  35.6   RDW  --  13.6   PLT  --  218     PHYSICAL EXAM:  /53 (BP Location: Right arm)   Pulse 64   Temp 98  F (36.7  C) (Oral)   Resp 16   Ht 1.803 m (5' 11\")   Wt 108.9 kg (240 lb)   SpO2 95%   BMI 33.47 kg/m    General: The patient is alert and oriented. Appropriate. No acute distress  Psych: pleasant affect, answers questions appropriately   Skin: Color appropriate for race, warm, dry.  Respiratory: The patient does not require supplemental oxygen. Breathing unlabored  GI:  Abdomen soft, nontender, minimally distended. No rigidity or guarding  Extremities: No edema noted.        ASSESSMENT:  Cash Cunningham is a 39 year old male with history of multiple SBO admitted on 2/11 with recurrent SBO and melena. SBO seems to be resolving, had nonbloody bowel movement and is passing gas. Currently free of pain or nausea.       PLAN:  - No indication for urgent surgical intervention. Patient interested in potential elective surgery in the future if he continues to have recurrent SBOs. Contact information provided, he will follow-up with " our office as needed after discharge  - EGD planned for today 2/12. NPO until then, IVFs  - Pending EGD findings and GI recommendations, ADAT after EGD  - Pain meds and anti-emetics available PRN  - From surgical perspective, no further indication for tele, OK to discontinue to allow patient to ambulate more easily  - Encourage ambulation    Jean Caldwell, MS3 with Marjorie Brumfield PA-C  [facility]  [phone]

## 2020-02-12 NOTE — PROGRESS NOTES
[facility]    Medicine Progress Note - Hospitalist Service       Date of Admission:  2/11/2020  Assessment & Plan   Cash Cunningham is a very pleasant 39 year old male with history of Meckel's diverticulum and small bowel obstruction who presented to the ED with abdominal pain and melena.  CT imaging shows small bowel obstruction.     Small bowel obstruction - improving  History of SBO and prior abdominal surgeries (Feb 2010) including Meckel's diverticulum removal  Sudden onset abdominal pain that woke him up this morning at 6 AM.  States that is the same as it has been in the past and in the periumbilical region.  No nausea or vomiting.  Melena as below.   - PRN analgesia and antiemetics  - No need for NG tube at this time  - General surgery consultation appreciated   - improving, +flatus, nonbloody BM last night     Melena  History of GERD and LA Grade D reflux esophagitis  Followed with GI through [facility].  Reports being on omeprazole since EGD identified ulcers or something a few months ago.  Noticed black stool this morning.  Otherwise asymptomatic in this regard.  - Gastroenterology - s/p EGD 2/12 - no bleeding, esoph ulcer healed  - PPI now PO daily  - starting clears, advance as tolerated  - possible discharge 2/13    Restless leg syndrome  Stable  - PTA regimen continued      Diet: Advance Diet as Tolerated: Clear Liquid Diet; Low Fiber  Diet    DVT Prophylaxis: Pneumatic Compression Devices and Ambulate every shift  [place] Catheter: not present  Code Status: Full Code      Disposition Plan   Expected discharge: possibly home tmrw if diet tolerated and surg and GI concur  Entered: Stas Chamorro MD 02/12/2020, 3:21 PM       The patient's care was discussed with the Bedside Nurse, Patient and Gastroenterologist.    Stas Chamorro MD  Hospitalist Service  [facility]    ______________________________________________________________________    Interval History   Seen  and examined.  Doing much better today.  Passing gas and had a nonbloody BM last night.  EGD now completed showed no active bleeding and a healed esophageal ulcer.  Will start clears and resume his home medications this evening.  Possible discharge home tomorrow.    Data reviewed today: I reviewed all medications, new labs and imaging results over the last 24 hours. I personally reviewed no images or EKG's today.    Physical Exam   Vital Signs: Temp: 97.4  F (36.3  C) Temp src: Oral BP: 112/63 Pulse: 80 Heart Rate: 74 Resp: 16 SpO2: 95 % O2 Device: None (Room air) Oxygen Delivery: 2 LPM  Weight: 240 lbs 0 oz    Gen: NAD, pleasant  HEENT: Normocephalic, EOMI, MMM  Resp: no crackles,  no wheezes, no increased work of resp  CV: S1S2 heard, reg rhythm, reg rate, no pedal edema  Abdo: soft, nontender, slightly distended, no rebound, bowel sounds present  Ext: calves nontender, well perfused  Neuro: AAOx3, CN grossly intact, no facial asymmetry      Data   Recent Labs   Lab 02/12/20  0912 02/11/20  1504 02/11/20  0709   WBC 10.2  --  9.0   HGB 13.8 14.3 15.4   MCV 82  --  82     --  218     --  141   POTASSIUM 3.6  --  3.8   CHLORIDE 113*  --  114*   CO2 20  --  20   BUN 13  --  17   CR 1.11  --  1.05   ANIONGAP 6  --  7   ANGELLA 8.1*  --  9.1   GLC 91  --  119*   ALBUMIN  --   --  4.2   PROTTOTAL  --   --  7.4   BILITOTAL  --   --  0.6   ALKPHOS  --   --  72   ALT  --   --  63   AST  --   --  29   LIPASE  --   --  180     No results found for this or any previous visit (from the past 24 hour(s)).

## 2020-02-12 NOTE — PROGRESS NOTES
[facility] General Surgery:    The patient was seen and examined today. I agree with the note written today by Marjorie Brumfield PA-C and Jean Caldwell, including the findings and plan as written.  Pertinent aspects from my exam or visit today in regards to medical decision making: Patient feeling better today.  Less abdominal pain.  Passing flatus and had nonbloody bowel movement this morning.  Following upper endoscopy today, okay from general surgery standpoint to start on clear liquids and advance as tolerated.  Abdomen is soft, minimally distended, mildly tender with palpation in the periumbilical area.    Rashmi Enriquez MD

## 2020-02-12 NOTE — DISCHARGE SUMMARY
[facility]  Hospitalist Discharge Summary       Date of Admission:  2/11/2020  Date of Discharge:  2/12/2020  5:59 PM  Discharging Provider: Stas Chamorro MD      Discharge Diagnoses   1. Melena with history of esophageal ulcers (healed)  2. SBO, recurrence  3. See below for other medical problems    Follow-ups Needed After Discharge   Follow-up Appointments     Follow-up and recommended labs and tests       You were seen by the General Surgery team at [facility] during   your hospital stay. Dr. Rashmi Enriquez was the surgeon you met. If you would   like to follow-up with a surgeon at some point to discuss options for   taking down adhesions in your abdomen, call our office at [phone] to   schedule an appointment with Dr. Enriquez or one of her partners. Our   clinic's name is [facility]. The address is [address] [place] [address],   [facility] [address], [place], [address]         Follow-up and recommended labs and tests       Gastroenterology in 2 weeks  PCP as needed             Unresulted Labs Ordered in the Past 30 Days of this Admission     No orders found for last 31 day(s).      These results will be followed up by NA    Discharge Disposition   Discharged to home  Condition at discharge: Stable    Hospital Course   Cash Cunningham is a very pleasant 39 year old male with history of Meckel's diverticulum and small bowel obstruction who presented to the ED with abdominal pain and melena.  CT imaging shows small bowel obstruction.     Small bowel obstruction - improving/resolved  History of SBO and prior abdominal surgeries (Feb 2010) including Meckel's diverticulum removal  Sudden onset abdominal pain that woke him up this morning at 6 AM.  States that is the same as it has been in the past and in the periumbilical region.  No nausea or vomiting.  Melena as below.   - PRN analgesia and antiemetics  - No need for NG tube at this time  - General surgery consultation appreciated - will contact  their office if/when interested in possible operative interventions  - improving, +flatus, nonbloody BM last night  - on day of discharge, patient tolerated PO intake and had 2 large brown BM per RN - he was emphatic to discharge home to his children and wife.   GI was in agreement that it was reasonable to get home especially as he has had many episodes previously, unfortunately.     Melena  History of GERD and LA Grade D reflux esophagitis  Followed with GI through [facility].  Reports being on omeprazole since EGD identified ulcers or something a few months ago.  Noticed black stool this morning.  Otherwise asymptomatic in this regard.  - Gastroenterology - s/p EGD 2/12 - no bleeding, esoph ulcer healed  - PPI now PO daily  - starting clears, advance as tolerated  - EGD showed no active bleeding and an already healed esophageal ulcer/erosion  - GI follow up in 2 weeks - will discuss further evaluation/endoscopies at that time     Restless leg syndrome  Stable  - PTA regimen continued    Consultations This Hospital Stay   SURGERY GENERAL IP CONSULT  GASTROENTEROLOGY IP CONSULT    Code Status   Prior    Time Spent on this Encounter   I, Stas Chamorro MD, personally saw the patient today and spent greater than 30 minutes discharging this patient.       Stas Chamorro MD  [facility]  ______________________________________________________________________    Physical Exam   Vital Signs: Temp: 97.4  F (36.3  C) Temp src: Oral BP: 112/63 Pulse: 80 Heart Rate: 74 Resp: 16 SpO2: 95 % O2 Device: None (Room air) Oxygen Delivery: 2 LPM  Weight: 240 lbs 0 oz    Gen: NAD, pleasant  HEENT: Normocephalic, EOMI, MMM  Resp: no crackles,  no wheezes, no increased work of resp  CV: S1S2 heard, reg rhythm, reg rate, no pedal edema  Abdo: soft, nontender, nondistended, bowel sounds present  Ext: calves nontender, well perfused  Neuro: AAOx3, CN grossly intact, no facial asymmetry         Primary Care Physician    [facility]    Discharge Orders      Follow-up and recommended labs and tests     You were seen by the General Surgery team at [facility] during your hospital stay. Dr. Rashmi Enriquez was the surgeon you met. If you would like to follow-up with a surgeon at some point to discuss options for taking down adhesions in your abdomen, call our office at [phone] to schedule an appointment with Dr. Enriquez or one of her partners. Our clinic's name is [facility]. The address is [address] [place] [address] [address], [place], [address]     Activity    Walk several times per day. Resume your regular activity as tolerated.     Reason for your hospital stay    Abdominal pain and melena     Follow-up and recommended labs and tests     Gastroenterology in 2 weeks  PCP as needed     Activity    Your activity upon discharge: activity as tolerated     Discharge Instructions    Advance diet as tolerated and continue your daily PPI (omeprazole).   Return to care if symptoms return.  GI follow up in 2 weeks  Surgery follow up as needed in the future     Diet    Follow a LOW FIBER diet for 2 weeks, then gradually resume a regular diet as tolerated. With your history of bowel obstruction, we recommend avoiding very hard to digest foods such as large amounts of leafy greens, raw vegetables, apples with peels, etc.     Diet    Follow this diet upon discharge: Orders Placed This Encounter      Diet      Low Fiber Diet       Significant Results and Procedures   Most Recent 3 CBC's:  Recent Labs   Lab Test 02/12/20  0912 02/11/20  1504 02/11/20  0709 12/18/19  0416   WBC 10.2  --  9.0 7.9   HGB 13.8 14.3 15.4 13.4   MCV 82  --  82 83     --  218 185     Most Recent 3 BMP's:  Recent Labs   Lab Test 02/12/20  0912 02/11/20  0709 12/18/19  0416    141 142   POTASSIUM 3.6 3.8 3.9   CHLORIDE 113* 114* 111*   CO2 20 20 25   BUN 13 17 16   CR 1.11 1.05 0.85   ANIONGAP 6 7 6   ANGELLA 8.1* 9.1 8.4*   GLC 91 119* 89   ,   Results  for orders placed or performed during the hospital encounter of 02/11/20   CT Abdomen Pelvis w Contrast    Narrative    CT ABDOMEN AND PELVIS WITH CONTRAST   2/11/2020 8:56 AM     HISTORY: Abdominal pain.    TECHNIQUE: 121 mL Isovue-370 IV were administered. After contrast  administration, volumetric helical sections were acquired from the  lung bases to the ischial tuberosities. Coronal images were also  reconstructed. Radiation dose for this scan was reduced using  automated exposure control, adjustment of the mA and/or kV according  to patient size, or iterative reconstruction technique.    COMPARISON: CT of the abdomen and pelvis performed 12/18/2019.     FINDINGS: There are several dilated loops of mid small bowel in the  lower abdomen anteriorly, with a transition point noted at small bowel  postoperative changes in the left lower quadrant (series 5 image 23;  series 3 image 164). Findings are consistent with a mid small bowel  obstruction. There is mild associated mesenteric fluid and edema.  Multiple decompressed loops of distal small bowel are noted in the  right lower quadrant. The colon is largely decompressed.     Trace amount of free fluid in the pelvis is likely related to the  bowel obstruction. No intra-abdominal fluid collections. No free  intraperitoneal air. There is diffuse fatty infiltration of the liver.  The liver, gallbladder, spleen, adrenal glands, pancreas, and kidneys  are otherwise unremarkable. No hydronephrosis. Small hiatal hernia.  The visualized lung bases are clear.      Impression    IMPRESSION:   1. There is a mid small bowel obstruction, with a transition point at  small bowel postoperative changes in the left lower quadrant.  2.  Diffuse fatty infiltration of the liver.    3. Trace amount of free fluid in the pelvis is nonspecific, but likely  related to the small bowel obstruction.    ARPAN BIRMINGHAM MD       Discharge Medications   Discharge Medication List as of 2/12/2020   5:30 PM      CONTINUE these medications which have NOT CHANGED    Details   albuterol (PROAIR HFA/PROVENTIL HFA/VENTOLIN HFA) 108 (90 Base) MCG/ACT inhaler Inhale 2 puffs into the lungs every 6 hours as needed for shortness of breath / dyspnea or wheezing, HistoricalPharmacy may dispense brand covered by insurance (Proair, or proventil or ventolin or generic albuterol inhaler)      amitriptyline (ELAVIL) 25 MG tablet Take 25 mg by mouth At Bedtime, Historical      azelastine (ASTELIN) 0.1 % nasal spray Spray 1-2 sprays into both nostrils 2 times daily as needed , Historical      ketotifen (ZADITOR/REFRESH ANTI-ITCH) 0.025 % ophthalmic solution Place 1 drop into both eyes 2 times daily as needed , Historical      MAGNESIUM PO Take 1 tablet by mouth daily, Historical      omeprazole (PRILOSEC) 40 MG DR capsule Take 1 capsule (40 mg) by mouth daily Take one hour before breakfast, Disp-60 capsule, R-0, Local Print      rOPINIRole (REQUIP) 1 MG tablet Take 1 mg by mouth At Bedtime, Historical      topiramate (TOPAMAX) 25 MG tablet Take 50 mg by mouth At Bedtime, Historical      VITAMIN D PO Take 2 tablets by mouth daily, Historical           Allergies   Allergies   Allergen Reactions     Cat Hair Extract Itching     eye lids may swell     Morphine Itching     Trazodone Other (See Comments)     Muscle spasms

## 2020-02-12 NOTE — PLAN OF CARE
VSS on room air. AO X4. Denies pain or nausea. Tolerating low fiber diet and fluids. Independent up in room. Voiding adequately. Had 2 brown BMs today. Discharge instructions discussed with patient and wife. No meds will be sent home with patient. All belongings with patient. All questions answered.

## 2020-02-12 NOTE — PLAN OF CARE
Small bowel obstruction.  CMS - denying any decreased sensation, no shortness of breath, no dizziness, no nausea. VSS.  NPO for procedure/ice chips provided/tolerating clear liquids after procedure/takes meds with water. Up with standby assist/gait belt to bathroom. Continent of bowel and bladder/2 bowel meds today - medium & large/brown formed (md aware). Headache this am/tylenol provided/helpful.  Pt scoring green on the Aggression Stop Light Tool.  Off unit for procedure.  Possible discharge this evening.

## 2020-02-12 NOTE — PROGRESS NOTES
[facility]  Gastroenterology Progress Note     Cash Cunningham MRN# [id number]   YOB: 1980 Age: 39 year old          Assessment and Plan:   Update: Patient has had improvement in abdominal pain with bowel movement overnight. Passing flatus. Has been NPO overnight for anticipated EGD today.    Melena  Cash Cunningham is a pleasant 39 year old male admitted on 2/11/2020 with recurrent SBO and reporting melenotic stools. Had prior EGD ( 12/2019) noting esophageal ulcer. There is a concern for melena related to esophageal ulcer vs small bowel bleed. Hemoglobin 15.6 -> 13.8  - Keep patient NPO  - Plan EGD today  - Continue with IV pantoprazole    Small Bowel obstruction  Patient passing stools. Pain improved. Surgery following. Can plan outpatient colonoscopy. Patient will consider further elective treatment with surgery due to multiple recurrent SBOs.         Small bowel obstruction (H)      Interval History:   no new complaints, denies chest pain, denies shortness of breath, denies abdominal pain, alert, oriented to person, place and time, has had a bowel movement in the last 24 hours and doing well; no cp, sob, n/v/d, or abd pain.              Review of Systems:   C: NEGATIVE for fever, chills, change in weight  E/M: NEGATIVE for ear, mouth and throat problems  R: NEGATIVE for significant cough or SOB  CV: NEGATIVE for chest pain, palpitations or peripheral edema             Medications:   I have reviewed this patient's current medications    pantoprazole (PROTONIX) IV  40 mg Intravenous BID     sodium chloride (PF)  3 mL Intracatheter Q8H                  Physical Exam:   Vitals were reviewed  Vital Signs with Ranges  Temp:  [97.5  F (36.4  C)-98.1  F (36.7  C)] 98  F (36.7  C)  Pulse:  [60-80] 64  Heart Rate:  [58-82] 67  Resp:  [14-18] 16  BP: ()/(45-84) 102/53  SpO2:  [90 %-96 %] 95 %  I/O last 3 completed shifts:  In: 1261 [P.O.:100; I.V.:1161]  Out: -   Constitutional: healthy,  alert and no distress   Cardiovascular: negative, PMI normal. No lifts, heaves, or thrills. RRR. No murmurs, clicks gallops or rub  Respiratory: negative, Percussion normal. Good diaphragmatic excursion. Lungs clear  Head: Normocephalic. No masses, lesions, tenderness or abnormalities  Neck: Neck supple. No adenopathy. Thyroid symmetric, normal size,, Carotids without bruits.  Abdomen: Abdomen soft, non-tender. BS normal. No masses, organomegaly           Data:   I reviewed the patient's new clinical lab test results.   Recent Labs   Lab Test 02/12/20  0912 02/11/20  1504 02/11/20  0709 12/18/19  0416   WBC 10.2  --  9.0 7.9   HGB 13.8 14.3 15.4 13.4   MCV 82  --  82 83     --  218 185     Recent Labs   Lab Test 02/12/20  0912 02/11/20  0709 12/18/19  0416   POTASSIUM 3.6 3.8 3.9   CHLORIDE 113* 114* 111*   CO2 20 20 25   BUN 13 17 16   ANIONGAP 6 7 6     Recent Labs   Lab Test 02/11/20  0907 02/11/20  0709 12/18/19  0519 11/13/19  0935 11/12/19  0014   ALBUMIN  --  4.2  --  3.7 4.1   BILITOTAL  --  0.6  --  0.6 0.4   ALT  --  63  --  51 63   AST  --  29  --  23 29   PROTEIN Negative  --  Negative  --   --    LIPASE  --  180  --   --  135       I reviewed the patient's new imaging results.    All laboratory data reviewed  All imaging studies reviewed by me.    Maribel Kennedy PA-C,  2/12/2020  [facility]  Office : [phone]  Cell: [phone] (Dr. Han)  Cell: [phone] (Maribel Kenneyd PA-C)

## 2020-02-12 NOTE — PLAN OF CARE
Pt here w/ SBO, possible esophageal ulcer bleeding. A&Ox4. ABD distended and tender, BS and gas +, no BM overnight. Up SBA, calls appropriately. NPO w/ ice chips. Tele NSR. IVF infusing. Complaints of mod pain, IV dilaudid effective, zofran and compazine given w/ dilaudid. SBPs soft, VSS otherwise. Page sent to hospitalist earlier in shift to inquire about tele and home meds, call back received to maintain tele and NPO (no home meds) until the AM rounds. Pt agreeable. Plan for possible EGD today, continue bowel rest and pain management, nrsng cont to monitor. Discharge pending.

## 2020-02-12 NOTE — PROVIDER NOTIFICATION
MD Notification    Notified Person: MD    Notified Person Name: Dr. Chamorro    Notification Date/Time: 02/12/2020 1539    Notification Interaction: Web based paging    Purpose of Notification: Pt tolerating clears and had 2 BMs today wondering if possible to discharge today    Orders Received: try eating something first and page back later to see progress    Comments:

## 2020-02-12 NOTE — PROGRESS NOTES
Report received from Endoscopy dept re: upper GI endoscopy. Patient received sedation, throat numbing approximately 1305 - should keep patient npo until 1400.  Will await any additional orders from GI.

## 2020-02-12 NOTE — PLAN OF CARE
Problem: Adult Inpatient Plan of Care  Goal: Plan of Care Review  Outcome: Improving     Problem: Pain (Intestinal Obstruction)  Goal: Acceptable Pain Control  Outcome: Improving   Needing IV dilaudid for pain control, effective. Fluids running, no BM, abdomen distended. Alert x 4.

## 2020-02-12 NOTE — PROVIDER NOTIFICATION
MD Notification    Notified Person: MD    Notified Person Name: Dr. Chamorro    Notification Date/Time: 02/12/2020 1722    Notification Interaction: Web based paging     Purpose of Notification: Pt tolerating solids ok, no nausea/pain. Ok to discharge? Do you need to add any additional info?    Orders Received: ok to discharge    Comments:

## 2020-03-09 ENCOUNTER — OFFICE VISIT (OUTPATIENT)
Dept: URGENT CARE | Facility: URGENT CARE | Age: 40
End: 2020-03-09
Payer: COMMERCIAL

## 2020-03-09 VITALS
TEMPERATURE: 97.4 F | HEART RATE: 85 BPM | SYSTOLIC BLOOD PRESSURE: 135 MMHG | DIASTOLIC BLOOD PRESSURE: 71 MMHG | OXYGEN SATURATION: 96 %

## 2020-03-09 DIAGNOSIS — L60.0 INGROWN NAIL OF GREAT TOE OF LEFT FOOT: Primary | ICD-10-CM

## 2020-03-09 PROCEDURE — 99213 OFFICE O/P EST LOW 20 MIN: CPT | Mod: 25 | Performed by: HOSPITALIST

## 2020-03-09 PROCEDURE — 11730 AVULSION NAIL PLATE SIMPLE 1: CPT | Performed by: HOSPITALIST

## 2020-03-09 RX ORDER — CEPHALEXIN 500 MG/1
500 CAPSULE ORAL 3 TIMES DAILY
Qty: 30 CAPSULE | Refills: 0 | Status: SHIPPED | OUTPATIENT
Start: 2020-03-09 | End: 2020-07-18

## 2020-03-09 NOTE — PROGRESS NOTES
Pt came here for left toe ingrown toe nail.no fever or  Chill.     Allergies   Allergen Reactions     Cat Hair Extract Itching     eye lids may swell     Morphine Itching     Trazodone Other (See Comments)     Muscle spasms       Past Medical History:   Diagnosis Date     Depressive disorder     early      Hiatal hernia      Left groin hernia      Restless leg syndrome     uses Requip     Small bowel obstruction (H) 2019       albuterol (PROAIR HFA/PROVENTIL HFA/VENTOLIN HFA) 108 (90 Base) MCG/ACT inhaler, Inhale 2 puffs into the lungs every 6 hours as needed for shortness of breath / dyspnea or wheezing  amitriptyline (ELAVIL) 25 MG tablet, Take 25 mg by mouth At Bedtime  azelastine (ASTELIN) 0.1 % nasal spray, Spray 1-2 sprays into both nostrils 2 times daily as needed   ketotifen (ZADITOR/REFRESH ANTI-ITCH) 0.025 % ophthalmic solution, Place 1 drop into both eyes 2 times daily as needed   MAGNESIUM PO, Take 1 tablet by mouth daily  [] omeprazole (PRILOSEC) 40 MG DR capsule, Take 1 capsule (40 mg) by mouth daily Take one hour before breakfast  rOPINIRole (REQUIP) 1 MG tablet, Take 1 mg by mouth At Bedtime  topiramate (TOPAMAX) 25 MG tablet, Take 50 mg by mouth At Bedtime  VITAMIN D PO, Take 2 tablets by mouth daily    No current facility-administered medications on file prior to visit.       Social History     Tobacco Use     Smoking status: Never Smoker     Smokeless tobacco: Never Used   Substance Use Topics     Alcohol use: Not Currently       ROS:  12 point ROS is done and aside that mention above all other review of system is negative    OBJECTIVE:  /71   Pulse 85   Temp 97.4  F (36.3  C) (Oral)   SpO2 96%   GENERAL APPEARANCE: ill, alert and mild to moderate  distress  EYES: conjunctiva clear  Ingrown toe nail noted on the large big toe on the lateral side of the toe    No results found for this or any previous visit (from the past 168 hour(s)).     ASSESSMENT:     ICD-10-CM    1.  Ingrown nail of great toe of left foot  L60.0 cephALEXin (KEFLEX) 500 MG capsule     acetaminophen-codeine (TYLENOL #3) 300-30 MG tablet         PLAN:    I do offer conservative measure vs partial toe nail removal. Pt opted for partial to nail removal. I did numb the toe with 2% lidocaine about 4cc each on the base lateral and medial of the great toe. With sterile measure I place a straingth clamp underneath the nail and roll it about 1/3 of the nail and cut the nail proximally, pt tolerate procedure well. Minimal blood noted, will sent him with keflex 500mg tid for 10 days, also recommend to apply bacitracin ointment daily with dressing change for the nest 3-4 days, keep the wound clean and dry for the next 3-4 days and then can leave open to air. Patient understand and agreeable with this plan, all question answered.    Tylenol and ibuprofen prn for  Pain   Follow up as needed only   Austin Cai MD MD

## 2020-07-18 ENCOUNTER — APPOINTMENT (OUTPATIENT)
Dept: CT IMAGING | Facility: CLINIC | Age: 40
DRG: 331 | End: 2020-07-18
Attending: EMERGENCY MEDICINE
Payer: COMMERCIAL

## 2020-07-18 ENCOUNTER — HOSPITAL ENCOUNTER (INPATIENT)
Facility: CLINIC | Age: 40
LOS: 6 days | Discharge: HOME | DRG: 331 | End: 2020-07-24
Attending: EMERGENCY MEDICINE | Admitting: INTERNAL MEDICINE
Payer: COMMERCIAL

## 2020-07-18 DIAGNOSIS — K59.03 DRUG-INDUCED CONSTIPATION: ICD-10-CM

## 2020-07-18 DIAGNOSIS — K56.609 SMALL BOWEL OBSTRUCTION (H): Primary | ICD-10-CM

## 2020-07-18 DIAGNOSIS — K56.609 SBO (SMALL BOWEL OBSTRUCTION) (H): ICD-10-CM

## 2020-07-18 LAB
ALBUMIN SERPL-MCNC: 4.2 G/DL (ref 3.4–5)
ALP SERPL-CCNC: 68 U/L (ref 40–150)
ALT SERPL W P-5'-P-CCNC: 52 U/L (ref 0–70)
ANION GAP SERPL CALCULATED.3IONS-SCNC: 6 MMOL/L (ref 3–14)
AST SERPL W P-5'-P-CCNC: 22 U/L (ref 0–45)
BASOPHILS # BLD AUTO: 0 10E9/L (ref 0–0.2)
BASOPHILS NFR BLD AUTO: 0.4 %
BILIRUB SERPL-MCNC: 0.5 MG/DL (ref 0.2–1.3)
BUN SERPL-MCNC: 11 MG/DL (ref 7–30)
CALCIUM SERPL-MCNC: 9.3 MG/DL (ref 8.5–10.1)
CHLORIDE SERPL-SCNC: 108 MMOL/L (ref 94–109)
CO2 SERPL-SCNC: 26 MMOL/L (ref 20–32)
CREAT SERPL-MCNC: 1.03 MG/DL (ref 0.66–1.25)
DIFFERENTIAL METHOD BLD: NORMAL
EOSINOPHIL # BLD AUTO: 0.2 10E9/L (ref 0–0.7)
EOSINOPHIL NFR BLD AUTO: 2.1 %
ERYTHROCYTE [DISTWIDTH] IN BLOOD BY AUTOMATED COUNT: 13.6 % (ref 10–15)
GFR SERPL CREATININE-BSD FRML MDRD: >90 ML/MIN/{1.73_M2}
GLUCOSE SERPL-MCNC: 119 MG/DL (ref 70–99)
HCT VFR BLD AUTO: 45.6 % (ref 40–53)
HGB BLD-MCNC: 15.7 G/DL (ref 13.3–17.7)
IMM GRANULOCYTES # BLD: 0 10E9/L (ref 0–0.4)
IMM GRANULOCYTES NFR BLD: 0.1 %
LABORATORY COMMENT REPORT: NORMAL
LIPASE SERPL-CCNC: 163 U/L (ref 73–393)
LYMPHOCYTES # BLD AUTO: 2.3 10E9/L (ref 0.8–5.3)
LYMPHOCYTES NFR BLD AUTO: 27.5 %
MCH RBC QN AUTO: 28.8 PG (ref 26.5–33)
MCHC RBC AUTO-ENTMCNC: 34.4 G/DL (ref 31.5–36.5)
MCV RBC AUTO: 84 FL (ref 78–100)
MONOCYTES # BLD AUTO: 0.5 10E9/L (ref 0–1.3)
MONOCYTES NFR BLD AUTO: 5.7 %
NEUTROPHILS # BLD AUTO: 5.3 10E9/L (ref 1.6–8.3)
NEUTROPHILS NFR BLD AUTO: 64.2 %
NRBC # BLD AUTO: 0 10*3/UL
NRBC BLD AUTO-RTO: 0 /100
PLATELET # BLD AUTO: 212 10E9/L (ref 150–450)
POTASSIUM SERPL-SCNC: 3.7 MMOL/L (ref 3.4–5.3)
PROT SERPL-MCNC: 7.3 G/DL (ref 6.8–8.8)
RBC # BLD AUTO: 5.45 10E12/L (ref 4.4–5.9)
SARS-COV-2 RNA SPEC QL NAA+PROBE: NEGATIVE
SARS-COV-2 RNA SPEC QL NAA+PROBE: NORMAL
SODIUM SERPL-SCNC: 140 MMOL/L (ref 133–144)
SPECIMEN SOURCE: NORMAL
SPECIMEN SOURCE: NORMAL
WBC # BLD AUTO: 8.2 10E9/L (ref 4–11)

## 2020-07-18 PROCEDURE — U0003 INFECTIOUS AGENT DETECTION BY NUCLEIC ACID (DNA OR RNA); SEVERE ACUTE RESPIRATORY SYNDROME CORONAVIRUS 2 (SARS-COV-2) (CORONAVIRUS DISEASE [COVID-19]), AMPLIFIED PROBE TECHNIQUE, MAKING USE OF HIGH THROUGHPUT TECHNOLOGIES AS DESCRIBED BY CMS-2020-01-R: HCPCS | Performed by: EMERGENCY MEDICINE

## 2020-07-18 PROCEDURE — 99285 EMERGENCY DEPT VISIT HI MDM: CPT | Mod: 25

## 2020-07-18 PROCEDURE — 74177 CT ABD & PELVIS W/CONTRAST: CPT

## 2020-07-18 PROCEDURE — 99223 1ST HOSP IP/OBS HIGH 75: CPT | Mod: AI | Performed by: INTERNAL MEDICINE

## 2020-07-18 PROCEDURE — 80053 COMPREHEN METABOLIC PANEL: CPT | Performed by: EMERGENCY MEDICINE

## 2020-07-18 PROCEDURE — 25000128 H RX IP 250 OP 636: Performed by: EMERGENCY MEDICINE

## 2020-07-18 PROCEDURE — 25800030 ZZH RX IP 258 OP 636: Performed by: INTERNAL MEDICINE

## 2020-07-18 PROCEDURE — 96375 TX/PRO/DX INJ NEW DRUG ADDON: CPT

## 2020-07-18 PROCEDURE — 85025 COMPLETE CBC W/AUTO DIFF WBC: CPT | Performed by: EMERGENCY MEDICINE

## 2020-07-18 PROCEDURE — 25800030 ZZH RX IP 258 OP 636: Performed by: EMERGENCY MEDICINE

## 2020-07-18 PROCEDURE — 25000125 ZZHC RX 250: Performed by: EMERGENCY MEDICINE

## 2020-07-18 PROCEDURE — 83690 ASSAY OF LIPASE: CPT | Performed by: EMERGENCY MEDICINE

## 2020-07-18 PROCEDURE — C9803 HOPD COVID-19 SPEC COLLECT: HCPCS

## 2020-07-18 PROCEDURE — 96374 THER/PROPH/DIAG INJ IV PUSH: CPT | Mod: 59

## 2020-07-18 PROCEDURE — 25000132 ZZH RX MED GY IP 250 OP 250 PS 637: Performed by: INTERNAL MEDICINE

## 2020-07-18 PROCEDURE — 96376 TX/PRO/DX INJ SAME DRUG ADON: CPT

## 2020-07-18 PROCEDURE — 25000128 H RX IP 250 OP 636: Performed by: INTERNAL MEDICINE

## 2020-07-18 PROCEDURE — 12000000 ZZH R&B MED SURG/OB

## 2020-07-18 RX ORDER — IOPAMIDOL 755 MG/ML
115 INJECTION, SOLUTION INTRAVASCULAR ONCE
Status: COMPLETED | OUTPATIENT
Start: 2020-07-18 | End: 2020-07-18

## 2020-07-18 RX ORDER — ONDANSETRON 2 MG/ML
4 INJECTION INTRAMUSCULAR; INTRAVENOUS EVERY 6 HOURS PRN
Status: DISCONTINUED | OUTPATIENT
Start: 2020-07-18 | End: 2020-07-24 | Stop reason: HOSPADM

## 2020-07-18 RX ORDER — CALCIUM CARBONATE 500 MG/1
1000 TABLET, CHEWABLE ORAL 4 TIMES DAILY PRN
Status: DISCONTINUED | OUTPATIENT
Start: 2020-07-18 | End: 2020-07-24 | Stop reason: HOSPADM

## 2020-07-18 RX ORDER — FINASTERIDE 1 MG/1
1 TABLET, FILM COATED ORAL DAILY
COMMUNITY

## 2020-07-18 RX ORDER — PROCHLORPERAZINE 25 MG
25 SUPPOSITORY, RECTAL RECTAL EVERY 12 HOURS PRN
Status: DISCONTINUED | OUTPATIENT
Start: 2020-07-18 | End: 2020-07-24 | Stop reason: HOSPADM

## 2020-07-18 RX ORDER — ACETAMINOPHEN 325 MG/1
650 TABLET ORAL EVERY 4 HOURS PRN
Status: DISCONTINUED | OUTPATIENT
Start: 2020-07-18 | End: 2020-07-24 | Stop reason: HOSPADM

## 2020-07-18 RX ORDER — POTASSIUM CHLORIDE 29.8 MG/ML
20 INJECTION INTRAVENOUS
Status: DISCONTINUED | OUTPATIENT
Start: 2020-07-18 | End: 2020-07-23

## 2020-07-18 RX ORDER — PROCHLORPERAZINE MALEATE 10 MG
10 TABLET ORAL EVERY 6 HOURS PRN
Status: DISCONTINUED | OUTPATIENT
Start: 2020-07-18 | End: 2020-07-24 | Stop reason: HOSPADM

## 2020-07-18 RX ORDER — SODIUM CHLORIDE 9 MG/ML
INJECTION, SOLUTION INTRAVENOUS CONTINUOUS
Status: DISCONTINUED | OUTPATIENT
Start: 2020-07-18 | End: 2020-07-19

## 2020-07-18 RX ORDER — OXYCODONE HYDROCHLORIDE 5 MG/1
5-10 TABLET ORAL
Status: DISCONTINUED | OUTPATIENT
Start: 2020-07-18 | End: 2020-07-24 | Stop reason: HOSPADM

## 2020-07-18 RX ORDER — HYDROMORPHONE HYDROCHLORIDE 1 MG/ML
.3-.5 INJECTION, SOLUTION INTRAMUSCULAR; INTRAVENOUS; SUBCUTANEOUS
Status: DISCONTINUED | OUTPATIENT
Start: 2020-07-18 | End: 2020-07-19

## 2020-07-18 RX ORDER — DIPHENHYDRAMINE HYDROCHLORIDE 50 MG/ML
25 INJECTION INTRAMUSCULAR; INTRAVENOUS EVERY 6 HOURS PRN
Status: DISCONTINUED | OUTPATIENT
Start: 2020-07-18 | End: 2020-07-19

## 2020-07-18 RX ORDER — POTASSIUM CHLORIDE 1.5 G/1.58G
20-40 POWDER, FOR SOLUTION ORAL
Status: DISCONTINUED | OUTPATIENT
Start: 2020-07-18 | End: 2020-07-23

## 2020-07-18 RX ORDER — POTASSIUM CHLORIDE 7.45 MG/ML
10 INJECTION INTRAVENOUS
Status: DISCONTINUED | OUTPATIENT
Start: 2020-07-18 | End: 2020-07-23

## 2020-07-18 RX ORDER — HYDROMORPHONE HYDROCHLORIDE 1 MG/ML
INJECTION, SOLUTION INTRAMUSCULAR; INTRAVENOUS; SUBCUTANEOUS
Status: DISCONTINUED
Start: 2020-07-18 | End: 2020-07-18 | Stop reason: HOSPADM

## 2020-07-18 RX ORDER — LIDOCAINE 40 MG/G
CREAM TOPICAL
Status: DISCONTINUED | OUTPATIENT
Start: 2020-07-18 | End: 2020-07-24 | Stop reason: HOSPADM

## 2020-07-18 RX ORDER — MAGNESIUM SULFATE HEPTAHYDRATE 40 MG/ML
4 INJECTION, SOLUTION INTRAVENOUS EVERY 4 HOURS PRN
Status: DISCONTINUED | OUTPATIENT
Start: 2020-07-18 | End: 2020-07-24 | Stop reason: HOSPADM

## 2020-07-18 RX ORDER — POTASSIUM CHLORIDE 1500 MG/1
20-40 TABLET, EXTENDED RELEASE ORAL
Status: DISCONTINUED | OUTPATIENT
Start: 2020-07-18 | End: 2020-07-23

## 2020-07-18 RX ORDER — NALOXONE HYDROCHLORIDE 0.4 MG/ML
.1-.4 INJECTION, SOLUTION INTRAMUSCULAR; INTRAVENOUS; SUBCUTANEOUS
Status: DISCONTINUED | OUTPATIENT
Start: 2020-07-18 | End: 2020-07-19

## 2020-07-18 RX ORDER — ONDANSETRON 2 MG/ML
4 INJECTION INTRAMUSCULAR; INTRAVENOUS ONCE
Status: COMPLETED | OUTPATIENT
Start: 2020-07-18 | End: 2020-07-18

## 2020-07-18 RX ORDER — HYDROMORPHONE HYDROCHLORIDE 1 MG/ML
0.5 INJECTION, SOLUTION INTRAMUSCULAR; INTRAVENOUS; SUBCUTANEOUS ONCE
Status: COMPLETED | OUTPATIENT
Start: 2020-07-18 | End: 2020-07-18

## 2020-07-18 RX ORDER — NICOTINE POLACRILEX 4 MG/1
20 GUM, CHEWING ORAL DAILY
COMMUNITY

## 2020-07-18 RX ORDER — POTASSIUM CL/LIDO/0.9 % NACL 10MEQ/0.1L
10 INTRAVENOUS SOLUTION, PIGGYBACK (ML) INTRAVENOUS
Status: DISCONTINUED | OUTPATIENT
Start: 2020-07-18 | End: 2020-07-23

## 2020-07-18 RX ORDER — HYDROMORPHONE HYDROCHLORIDE 1 MG/ML
.3-.5 INJECTION, SOLUTION INTRAMUSCULAR; INTRAVENOUS; SUBCUTANEOUS
Status: DISCONTINUED | OUTPATIENT
Start: 2020-07-18 | End: 2020-07-18

## 2020-07-18 RX ORDER — ONDANSETRON 4 MG/1
4 TABLET, ORALLY DISINTEGRATING ORAL EVERY 6 HOURS PRN
Status: DISCONTINUED | OUTPATIENT
Start: 2020-07-18 | End: 2020-07-24 | Stop reason: HOSPADM

## 2020-07-18 RX ORDER — DIPHENHYDRAMINE HCL 25 MG
25 CAPSULE ORAL EVERY 6 HOURS PRN
Status: DISCONTINUED | OUTPATIENT
Start: 2020-07-18 | End: 2020-07-19

## 2020-07-18 RX ADMIN — ACETAMINOPHEN 650 MG: 325 TABLET, FILM COATED ORAL at 17:27

## 2020-07-18 RX ADMIN — SODIUM CHLORIDE 1000 ML: 9 INJECTION, SOLUTION INTRAVENOUS at 13:14

## 2020-07-18 RX ADMIN — OXYCODONE HYDROCHLORIDE 5 MG: 5 TABLET ORAL at 16:31

## 2020-07-18 RX ADMIN — HYDROMORPHONE HYDROCHLORIDE 0.5 MG: 1 INJECTION, SOLUTION INTRAMUSCULAR; INTRAVENOUS; SUBCUTANEOUS at 15:45

## 2020-07-18 RX ADMIN — ONDANSETRON 4 MG: 4 TABLET, ORALLY DISINTEGRATING ORAL at 15:49

## 2020-07-18 RX ADMIN — ONDANSETRON 4 MG: 2 INJECTION INTRAMUSCULAR; INTRAVENOUS at 13:14

## 2020-07-18 RX ADMIN — HYDROMORPHONE HYDROCHLORIDE 0.5 MG: 1 INJECTION, SOLUTION INTRAMUSCULAR; INTRAVENOUS; SUBCUTANEOUS at 21:03

## 2020-07-18 RX ADMIN — HYDROMORPHONE HYDROCHLORIDE 0.5 MG: 1 INJECTION, SOLUTION INTRAMUSCULAR; INTRAVENOUS; SUBCUTANEOUS at 17:46

## 2020-07-18 RX ADMIN — PROCHLORPERAZINE EDISYLATE 10 MG: 5 INJECTION INTRAMUSCULAR; INTRAVENOUS at 17:48

## 2020-07-18 RX ADMIN — SODIUM CHLORIDE 73 ML: 9 INJECTION, SOLUTION INTRAVENOUS at 13:36

## 2020-07-18 RX ADMIN — IOPAMIDOL 115 ML: 755 INJECTION, SOLUTION INTRAVENOUS at 13:36

## 2020-07-18 RX ADMIN — HYDROMORPHONE HYDROCHLORIDE 0.5 MG: 1 INJECTION, SOLUTION INTRAMUSCULAR; INTRAVENOUS; SUBCUTANEOUS at 14:13

## 2020-07-18 RX ADMIN — SODIUM CHLORIDE: 9 INJECTION, SOLUTION INTRAVENOUS at 15:47

## 2020-07-18 RX ADMIN — DIPHENHYDRAMINE HYDROCHLORIDE 25 MG: 50 INJECTION, SOLUTION INTRAMUSCULAR; INTRAVENOUS at 16:28

## 2020-07-18 RX ADMIN — HYDROMORPHONE HYDROCHLORIDE 1 MG: 1 INJECTION, SOLUTION INTRAMUSCULAR; INTRAVENOUS; SUBCUTANEOUS at 13:14

## 2020-07-18 ASSESSMENT — ACTIVITIES OF DAILY LIVING (ADL)
RETIRED_EATING: 0-->INDEPENDENT
FALL_HISTORY_WITHIN_LAST_SIX_MONTHS: NO
TOILETING: 0-->INDEPENDENT
DRESS: 0-->INDEPENDENT
TRANSFERRING: 0-->INDEPENDENT
ADLS_ACUITY_SCORE: 12
COGNITION: 0 - NO COGNITION ISSUES REPORTED
BATHING: 0-->INDEPENDENT
RETIRED_COMMUNICATION: 0-->UNDERSTANDS/COMMUNICATES WITHOUT DIFFICULTY
ADLS_ACUITY_SCORE: 12
SWALLOWING: 0-->SWALLOWS FOODS/LIQUIDS WITHOUT DIFFICULTY
AMBULATION: 0-->INDEPENDENT

## 2020-07-18 ASSESSMENT — ENCOUNTER SYMPTOMS
DIARRHEA: 0
DIFFICULTY URINATING: 0
NAUSEA: 1
CONSTIPATION: 0
BLOOD IN STOOL: 0
DYSURIA: 0
ABDOMINAL PAIN: 1

## 2020-07-18 ASSESSMENT — MIFFLIN-ST. JEOR: SCORE: 1990.4

## 2020-07-18 NOTE — PHARMACY-ADMISSION MEDICATION HISTORY
Pharmacy Medication History  Admission medication history interview status for the 7/18/2020  admission is complete. See EPIC admission navigator for prior to admission medications     Medication history sources: Patient  Medication history source reliability: Good  Adherence assessment: Good    Significant changes made to the medication list:  Added Omeprazole, Finasteride      Medication reconciliation completed by provider prior to medication history? No    Time spent in this activity: 14min      Prior to Admission medications    Medication Sig Last Dose Taking? Auth Provider   albuterol (PROAIR HFA/PROVENTIL HFA/VENTOLIN HFA) 108 (90 Base) MCG/ACT inhaler Inhale 2 puffs into the lungs every 6 hours as needed for shortness of breath / dyspnea or wheezing  Yes Unknown, Entered By History   amitriptyline (ELAVIL) 25 MG tablet Take 25 mg by mouth At Bedtime 7/17/2020 at Unknown time Yes Unknown, Entered By History   azelastine (ASTELIN) 0.1 % nasal spray Spray 1-2 sprays into both nostrils 2 times daily as needed   Yes Unknown, Entered By History   finasteride (PROPECIA) 1 MG tablet Take 1 mg by mouth daily 7/18/2020 at Unknown time Yes Unknown, Entered By History   ketotifen (ZADITOR/REFRESH ANTI-ITCH) 0.025 % ophthalmic solution Place 1 drop into both eyes 2 times daily as needed   Yes Unknown, Entered By History   Magnesium Oxide 250 MG TABS Take 500 mg by mouth daily 7/18/2020 at Unknown time Yes Unknown, Entered By History   omeprazole 20 MG tablet Take 20 mg by mouth daily 7/17/2020 at Unknown time Yes Unknown, Entered By History   rOPINIRole (REQUIP) 1 MG tablet Take 1 mg by mouth At Bedtime 7/17/2020 at Unknown time Yes Unknown, Entered By History

## 2020-07-18 NOTE — ED TRIAGE NOTES
C/o gen ab pain since this Am with vomitting.  States hx of bowel obstruction and feels like that.

## 2020-07-18 NOTE — ED NOTES
"[facility]  ED Nurse Handoff Report    ED Chief complaint: Abdominal Pain      ED Diagnosis:   Final diagnoses:   SBO (small bowel obstruction) (H)       Code Status: Full Code    Allergies:   Allergies   Allergen Reactions     Cat Hair Extract Itching     eye lids may swell     Morphine Itching     Trazodone Other (See Comments)     Muscle spasms       Patient Story: Pt. Started having a sudden onset of abdominal pain today.  Focused Assessment:  A/Ox4, can make needs known. Abdomen tender on palpation and pt is guarding. Denies sob, cough or other.3    Treatments and/or interventions provided: Labs, CT, meds  Patient's response to treatments and/or interventions: well    To be done/followed up on inpatient unit:  Monitor    Does this patient have any cognitive concerns?: None    Activity level - Baseline/Home:  Independent  Activity Level - Current:   Independent    Patient's Preferred language: English   Needed?: No    Isolation: None  Infection: Not Applicable  Bariatric?: No    Vital Signs:   Vitals:    07/18/20 1249 07/18/20 1251 07/18/20 1320 07/18/20 1350   BP: (!) 154/88   (!) 147/90   Pulse:    66   Resp: 22      Temp: 98.3  F (36.8  C)      TempSrc: Oral      SpO2: 99%  96% 92%   Weight:  104.3 kg (230 lb)     Height:  1.803 m (5' 11\")         Cardiac Rhythm:     Was the PSS-3 completed:   Yes  What interventions are required if any?               Family Comments: None  OBS brochure/video discussed/provided to patient/family: NA              Name of person given brochure if not patient: NA              Relationship to patient: NA    For the majority of the shift this patient's behavior was Green.   Behavioral interventions performed were None    ED NURSE PHONE NUMBER: [phone]       "

## 2020-07-18 NOTE — H&P
[facility]    History and Physical : Hospitalist Service:        Date of Admission:  7/18/2020    Cumulative Summary:   Cash Cunningham is a 39 year old pleasant male with past medical history significant for Meckel's diverticulum for which he has undergone resection and reanastomosis in 2010 through [facility] in [place].  Since then he has recurrent abdominal pain associated with prior small bowel obstructions.  Patient developed similar abdominal discomfort this morning and presented to ER for further evaluation and management and was found to have a small bowel obstruction and was admitted.    Assessment & Plan     Principal Problem:  Small bowel obstruction (H) (11/12/2019): His past medical history is significant for Meckel's diverticulum for which he has undergone resection and reanastomosis in 2010 through [facility] in [place].   Since then patient has recurrent small bowel obstructions, almost couple of episodes per year till 2016.  His episodes resolved for few years but then he had an episode of SBO in November 2019, followed by episode in February 2020 and now he is returning with similar abdominal discomfort which started this morning   His CT scan is concerning for a small bowel obstruction with transition point at the small bowel anastomosis in the mid/lower abdomen.  Findings are similar but less severe as compared to February 2020.  There was also mild mesenteric edema and free fluid.  Of note, patient usual symptoms are more consistent with abdominal pain rather than nausea and vomiting and he has not required NG tube placement in the past.    --Admit patient to general medical floor.  --We will keep patient n.p.o. at this time except ice chips and medications if he can tolerate.  --We will start patient on IV fluids.  --We will order IV Dilaudid 0.3 to 0.5 mg every 2 hours as needed.  --At this time I have also ordered oral pain medications with Roxicodone and Tylenol if patient  is able to tolerate oral medications.  --We will also go ahead and order IV Zofran and IV Compazine.  --Patient also developed significant itching with Dilaudid, will go ahead and will order Benadryl as morphine has been mentioned in his allergies list.  --At this point I will also go ahead and will consult general surgery, patient does not need any NG tube at this time to evaluate if patient will need any surgical intervention or adhesion lysis.    Restless leg syndrome  --When patient is able to advance diet and tolerate oral medications then will resume his PTA Requip.    History of anxiety and depression  --Plan to start patient on PTA Cymbalta once pharmacy has reconciled his medications and patient is not n.p.o.    Diet: NPO  [place] Catheter: not present  DVT Prophylaxis: Pneumatic Compression Devices  Code Status: Full Code    Disposition: Expected discharge in 1 to 2 days once patient symptoms are improved and patient is able to tolerate oral diet.    The patient's care was discussed with the Bedside Nurse and Patient.    Shona Mcadams MD,FACP    ----------------------------------------------------------------------------------------------------------------------    Primary Care Physician   [facility]    Chief Complaint   Acute abdominal pain    History is obtained from the patient    Patient Active Problem List   Diagnosis     Small bowel obstruction (H)     SBO (small bowel obstruction) (H)       History of Present Illness   Cash Cunningham is a 39 year old male with past medical history significant for Meckel's diverticulum for which she has undergone resection and reanastomosis in 2010 through [facility] in [place].  Since then patient has recurrent abdominal pain associated with prior small bowel obstructions almost couple of episodes per year till 2016.  His episodes resolved for few years but then he has an episode of SBO in November 2019, February 2020 and now again this morning he had similar  symptoms.Of note patient also has a history of pelvic floor dysfunction and negative MR enterography in July 2017 following a hospitalization for small bowel obstruction at that time.  Patient usually does not require NG tube in the past during bowel obstructions and he does not have significant nausea or vomiting with these bowel obstructions his primary complaint is usually abdominal discomfort.    Patient was in his usual state of health he worked the night shift yesterday and did have [employer] breakfast this morning.  He has not noticed any association with any specific type of food intake.  He started to develop abdominal discomfort earlier this morning and when his pain got worsened mostly in periumbilical area not aggravated or relieved by anything he presented to the ER as his pain was similar to his previous episodes of SBO.  On arrival to the emergency room, he has slightly elevated blood pressure of 154/88, temperature of 36.8, heart rate of 81, respiratory rate of 22 and he was saturating 99% on room air.  He seemed in significant discomfort on arrival.  His CT scan was concerning for a small bowel obstruction with transition point at the small bowel anastomosis in the mid/lower abdomen.  Findings are similar but less severe as compared to February 2020.  There was also mild mesenteric edema and free fluid.  No discrete drainable fluid collections or intraperitoneal free air was noticed.  He was given IV fluids, IV Zofran and IV Dilaudid for pain control and was requested to be admitted for further evaluation and management.      Review of Systems   CONSTITUTIONAL:  Negative for fatigue and generalized weakness.  EYES:  negative for blurred vision and visual disturbance  HEENT:  negative for hoarseness and voice change  RESPIRATORY:  negative for cough with sputum, dyspnea, wheezing and chest pain  CARDIOVASCULAR:  negative for chest pain, palpitations, orthopnea, exertional chest  pressure/discomfort  GASTROINTESTINAL:As per [place]  GENITOURINARY: Negative for burning /urgency and frequency.  HEMATOLOGIC/LYMPHATIC:  negative  ALLERGIC/IMMUNOLOGIC:  negative for drug reactions  ENDOCRINE:  negative for diabetic symptoms including polyuria, polydipsia and weight loss  MUSCULOSKELETAL: Negative for arthralgias  NEUROLOGICAL:  negative  BEHAVIOR/PSYCH: negative    Past Medical History    I have reviewed this patient's medical history and updated it with pertinent information if needed.   Past Medical History:   Diagnosis Date     Depressive disorder     early 20's     Hiatal hernia      Left groin hernia      Restless leg syndrome     uses Requip     Small bowel obstruction (H) 2019       Past Surgical History   I have reviewed this patient's surgical history and updated it with pertinent information if needed.  Past Surgical History:   Procedure Laterality Date     COLONOSCOPY       ESOPHAGOSCOPY, GASTROSCOPY, DUODENOSCOPY (EGD), COMBINED N/A 12/31/2019    Procedure: ESOPHAGOGASTRODUODENOSCOPY, WITH BIOPSY;  Surgeon: Jeremy Villareal MD;  Location:  GI     ESOPHAGOSCOPY, GASTROSCOPY, DUODENOSCOPY (EGD), COMBINED N/A 2/12/2020    Procedure: ESOPHAGOGASTRODUODENOSCOPY (EGD);  Surgeon: Bertram Coyne MD;  Location:  GI     REMOVE FISTULA ANAL      x2     SMALL BOWEL RESECTION  2010     TONSILLECTOMY & ADENOIDECTOMY         Prior to Admission Medications   Prior to Admission Medications   Prescriptions Last Dose Informant Patient Reported? Taking?   Magnesium Oxide 250 MG TABS 7/18/2020 at Unknown time  Yes Yes   Sig: Take 500 mg by mouth daily   albuterol (PROAIR HFA/PROVENTIL HFA/VENTOLIN HFA) 108 (90 Base) MCG/ACT inhaler  Pharmacy Yes Yes   Sig: Inhale 2 puffs into the lungs every 6 hours as needed for shortness of breath / dyspnea or wheezing   amitriptyline (ELAVIL) 25 MG tablet 7/17/2020 at Unknown time  Yes Yes   Sig: Take 25 mg by mouth At Bedtime   azelastine (ASTELIN) 0.1 % nasal spray   Pharmacy Yes Yes   Sig: [place] 1-2 sprays into both nostrils 2 times daily as needed    finasteride (PROPECIA) 1 MG tablet 7/18/2020 at Unknown time  Yes Yes   Sig: Take 1 mg by mouth daily   ketotifen (ZADITOR/REFRESH ANTI-ITCH) 0.025 % ophthalmic solution  Pharmacy Yes Yes   Sig: Place 1 drop into both eyes 2 times daily as needed    omeprazole 20 MG tablet 7/17/2020 at Unknown time  Yes Yes   Sig: Take 20 mg by mouth daily   rOPINIRole (REQUIP) 1 MG tablet 7/17/2020 at Unknown time Pharmacy Yes Yes   Sig: Take 1 mg by mouth At Bedtime      Facility-Administered Medications: None     Allergies   Allergies   Allergen Reactions     Cat Hair Extract Itching     eye lids may swell     Morphine Itching     Trazodone Other (See Comments)     Muscle spasms       Social History   I have reviewed this patient's social history and updated it with pertinent information if needed. Cash Cunningham  reports that he has never smoked. He has never used smokeless tobacco. He reports previous alcohol use. He reports that he does not use drugs.    Family History   I have reviewed this patient's family history and updated it with pertinent information if needed.   History reviewed. No pertinent family history.    Physical Exam   Temp: 98.3  F (36.8  C) Temp src: Oral BP: (!) 147/90 Pulse: 66 Heart Rate: 81 Resp: 22 SpO2: 92 % O2 Device: None (Room air)    Vital Signs with Ranges  Temp:  [98.3  F (36.8  C)] 98.3  F (36.8  C)  Pulse:  [66] 66  Heart Rate:  [81] 81  Resp:  [22] 22  BP: (147-154)/(88-90) 147/90  SpO2:  [92 %-99 %] 92 %  230 lbs 0 oz    Constitutional: Awake, alert,oriented to time, place and person , cooperative,in mild to moderate discomfort.Pleasent and cooperative   Eyes: Conjunctiva and pupils examined and normal.  HEENT: Moist mucous membranes, normal dentition.  Respiratory: Clear to auscultation bilaterally, no crackles or wheezing.  Cardiovascular: Regular rate and rhythm, normal S1 and S2, and no murmur  noted.  GI: Soft, non-distended, generalized tenderness , most prominent in periumbilical area,no guarding or rigidity , no rebound tenderness , hypoactive  bowel sounds.  Lymph/Hematologic: No anterior cervical or supraclavicular adenopathy.  Skin: No rashes, no cyanosis, no edema.  Musculoskeletal: No joint swelling, erythema or tenderness.  Neurologic: Cranial nerves 2-12 intact, normal strength and sensation.  Psychiatric: Alert, oriented to person, place and time, no obvious anxiety or depression.    Data   Data reviewed today:  I personally reviewed the abdominal CT image(s) showing Small bowel obstruction with transition point at the SBO anastomosis in mid/lower abdomen and mild mesenteric edema and free fluid.  Recent Labs   Lab 07/18/20  1312   WBC 8.2   HGB 15.7   MCV 84         POTASSIUM 3.7   CHLORIDE 108   CO2 26   BUN 11   CR 1.03   ANIONGAP 6   ANGELLA 9.3   *   ALBUMIN 4.2   PROTTOTAL 7.3   BILITOTAL 0.5   ALKPHOS 68   ALT 52   AST 22   LIPASE 163       Imaging:  Recent Results (from the past 24 hour(s))   CT Abdomen Pelvis w Contrast    Narrative    CT ABDOMEN PELVIS W CONTRAST 7/18/2020 1:45 PM    CLINICAL HISTORY: check for SBO, abdominal pain and vomiting    TECHNIQUE: CT scan of the abdomen and pelvis was performed following  injection of IV contrast. Multiplanar reformats were obtained. Dose  reduction techniques were used.  CONTRAST: 115mL Isovue-370    COMPARISON: CT abdomen and pelvis 2/11/2020    FINDINGS:   LOWER CHEST: Normal.    HEPATOBILIARY: Focal fat near the falciform. No calcified gallstones  or biliary dilation.    PANCREAS: No acute peripancreatic inflammatory changes.    SPLEEN: Normal.    ADRENAL GLANDS: Normal.    KIDNEYS/BLADDER: Both kidneys enhance symmetrically. No  hydronephrosis. Subcentimeter hypodensities in both kidneys, most  likely representing cysts, do not require further imaging evaluation.  The bladder is decompressed.    BOWEL: Tiny sliding  hiatal hernia. Gastric distention. Again seen are  several loops of mildly dilated small bowel with transition point at  an enteroenterostomy in the lower abdomen this is overall similar but  less extensive compared to 2/11/2020. There is fecalization of the  intraluminal contents proximal to the anastomosis. Again noted is mild  mesenteric edema and fluid. No bowel wall pneumatosis. Normal  appendix. Normal appearance to the colon.    PELVIC ORGANS: Normal.    LYMPH NODES: No abdominopelvic lymphadenopathy.    ADDITIONAL FINDINGS: Trace free fluid. No intraperitoneal free air. No  discrete drainable fluid collections in the abdomen or pelvis.    MUSCULOSKELETAL: No significant osseous abnormality.      Impression    IMPRESSION:   1.  Small bowel obstruction with transition point at the small bowel  anastomosis in the mid/lower abdomen. Findings are similar, but less  severe compared to 2/11/2020.  2.  Mild mesenteric edema and free fluid. No discrete drainable fluid  collections or intraperitoneal free air.    MAHOGANY DELGADO MD

## 2020-07-18 NOTE — PROGRESS NOTES
Surgery    Patient admitted with multiply recurrent SBO. CT shows obstruction at previous anastomosis similar to his previous episodes. No signs of ischemia or high grade obstruction.  WBC WNL. Would recommend placing NGT if nausea or worsening pain but can continue conservative treatment for now. Will follow up in the AM to formulate a definitive treatment plan pending his symptoms.    Sher Goodrich M.D.  [facility]  [phone]

## 2020-07-18 NOTE — PROGRESS NOTES
Pt arrived to unit around 1530.  AO4.  Independent in room.  IVF.  Rating pain 7-8/10. Administering Dilaudid Q2h, Oxy Q3h (x1), Tylenol Q4h (x1), orals upset stomach more.  Given Zofran and Compazine x1 each.  Will monitor if Dilaudid is enough as pt may need NGT per surgery note.  VSS on RA.  No other complaints throughout shift.  NPO except ice chips.

## 2020-07-18 NOTE — ED PROVIDER NOTES
"  History     Chief Complaint:  Abdominal Pain      HPI   Cash Cunningham is a 39 year old male, with a history of bowel obstructions with the last one being in February, who presents with sudden onset of severe abdominal pain which feels the same as his previous small bowel obstructions.  The pain is generalized, but worst in the midline lower abdomen as per his usual SBO's.  He was nauseated and vomited once. The patient denies stool changes, diarrhea, fever/chills, cough/cold symptoms, myalgias, loss of taste/smell, chest pain, SOB and blood in the urine. No known COVID-19 exposure.    Allergies:  Morphine  Trazodone     Medications:    Albuterol  Elavil  Astelin  Zaditor   Magnesium  Requip  Topamax    Past Medical History:    Depressive disorder  Hiatal hernia  Left groin hernia  Restless leg syndrome  SBO      Past Surgical History:    Anal fistula removal  Small bowel resection  Tonsillectomy and adenoidectomy    Family History:    No family history on file.    Social History:  Smoking status: Never  Alcohol use: Not currently  Drug use: Never  PCP: [facility]  Marital Status:   [2]     Review of Systems   Gastrointestinal: Positive for abdominal pain and nausea. Negative for blood in stool, constipation and diarrhea.   Genitourinary: Negative for decreased urine volume, difficulty urinating, dysuria and urgency.   All other systems reviewed and are negative.      Physical Exam     Patient Vitals for the past 24 hrs:   BP Temp Temp src Pulse Heart Rate Resp SpO2 Height Weight   07/18/20 1350 (!) 147/90 -- -- 66 -- -- 92 % -- --   07/18/20 1320 -- -- -- -- -- -- 96 % -- --   07/18/20 1251 -- -- -- -- -- -- -- 1.803 m (5' 11\") 104.3 kg (230 lb)   07/18/20 1249 (!) 154/88 98.3  F (36.8  C) Oral -- 81 22 99 % -- --       Physical Exam  Nursing note and vitals reviewed.  Constitutional:  Appears well-developed and well-nourished. Appears to be in significant pain. Has not vomited recently.   HENT:   Head: "    Atraumatic.   Mouth/Throat:   Oropharynx is clear and moist. No oropharyngeal exudate.   Eyes:    Pupils are equal, round, and reactive to light.   Neck:    Normal range of motion. Neck supple.      No tracheal deviation present. No thyromegaly present.   Cardiovascular:  Normal rate, regular rhythm, no murmur   Pulmonary/Chest: Breath sounds are clear and equal without wheezes or crackles.  Abdominal:   Soft. Bowel sounds are normal. Exhibits no mass. There is no tenderness.      There is rebound and guarding. Exhibits moderate distension.   Musculoskeletal:  Exhibits no edema.   Lymphadenopathy:  No cervical adenopathy.   Neurological:   Alert and oriented to person, place, and time.   Skin:    Skin is warm and dry. No rash noted. No pallor.     Emergency Department Course     Imaging:  Radiology findings were communicated with the patient who voiced understanding of the findings.    CT Abdomen/Pelvis with IV contrast: per radiology.   1.  Small bowel obstruction with transition point at the small bowel   anastomosis in the mid/lower abdomen. Findings are similar, but less   severe compared to 2/11/2020.   2.  Mild mesenteric edema and free fluid. No discrete drainable fluid   collections or intraperitoneal free air.     Laboratory:  Laboratory findings were communicated with the patient who voiced understanding of the findings.    CBC: AWNL. (WBC 8.2, HGB 15.7, )     CMP: Glucose 119 (H)WNL (Creatinine: 1.03)    Lipase: 163    Interventions:  1314 NS 1L IV Bolus  1314 Zofran 4mg IV injection     Emergency Department Course:  Past medical records, nursing notes, and vitals reviewed.    1304 I performed an exam of the patient as documented above.     IV was inserted and blood was drawn for laboratory testing, results above.  The patient was sent for a Abdominal/pelvic CT while in the emergency department, results above.     1350 I rechecked the patient and discussed the results of his workup thus far.      Findings and plan explained to the Patient who consents to admission. Discussed the patient with Dr. Mcadams, who will admit the patient to a surgical bed for further monitoring, evaluation, and treatment.    I personally reviewed the laboratory and imaging results with the Patient and answered all related questions prior to admission.     Impression & Plan     Medical Decision Making:  Cash Cunningham is a 39 year old male who presents for evaluation of vomiting and abdominal pain.  The differential diagnosis included but was not limited to bowel obstruction, pancreatitis, cholecystitis, biliary colic, ischemic bowel.     The clinical presentation is consistent with bowel obstruction which was confirmed by CT.  There are no signs of surgical emergencies or intraabdominal catastrophes such as volvulus, gut ischemia, perforation, etc.      Patient has been resuscitated with IVF. Symptoms have improved with antiemetics and parenteral pain medications. A NG was placed for management as well.     The patient will be admitted for bowel rest, continued IV hydration, and symptom management. There was no immediate indication for surgical consultation in the ED.  I have spoken with Dr. Mcadams who accepted the patient for admission for ongoing evaluation, monitoring and management.     Plan:   Admit to Dr. Mcadams for ongoing evaluation, monitoring and management.     Diagnosis:    ICD-10-CM    1. SBO (small bowel obstruction) (H)  K56.609        Disposition:  Admitted to Surgical.    Scribe Disclosure:  CARIN, Agustin Lopez, am serving as a scribe at 1:04 PM on 7/18/2020 to document services personally performed by Nehal Arizmendi MD based on my observations and the provider's statements to me.        Nehal Arizmendi MD  07/18/20 1453

## 2020-07-18 NOTE — PROGRESS NOTES
RECEIVING UNIT ED HANDOFF REVIEW    ED Nurse Handoff Report was reviewed by: Deborah Chamberlain RN on July 18, 2020 at 2:40 PM

## 2020-07-19 ENCOUNTER — ANESTHESIA (OUTPATIENT)
Dept: SURGERY | Facility: CLINIC | Age: 40
DRG: 331 | End: 2020-07-19
Payer: COMMERCIAL

## 2020-07-19 ENCOUNTER — ANESTHESIA EVENT (OUTPATIENT)
Dept: SURGERY | Facility: CLINIC | Age: 40
DRG: 331 | End: 2020-07-19
Payer: COMMERCIAL

## 2020-07-19 ENCOUNTER — SURGERY (OUTPATIENT)
Age: 40
End: 2020-07-19
Payer: COMMERCIAL

## 2020-07-19 LAB
ANION GAP SERPL CALCULATED.3IONS-SCNC: 5 MMOL/L (ref 3–14)
BUN SERPL-MCNC: 10 MG/DL (ref 7–30)
CALCIUM SERPL-MCNC: 8.5 MG/DL (ref 8.5–10.1)
CHLORIDE SERPL-SCNC: 107 MMOL/L (ref 94–109)
CO2 SERPL-SCNC: 26 MMOL/L (ref 20–32)
CREAT SERPL-MCNC: 0.96 MG/DL (ref 0.66–1.25)
ERYTHROCYTE [DISTWIDTH] IN BLOOD BY AUTOMATED COUNT: 13.7 % (ref 10–15)
GFR SERPL CREATININE-BSD FRML MDRD: >90 ML/MIN/{1.73_M2}
GLUCOSE SERPL-MCNC: 150 MG/DL (ref 70–99)
HCT VFR BLD AUTO: 44.3 % (ref 40–53)
HGB BLD-MCNC: 15.4 G/DL (ref 13.3–17.7)
MCH RBC QN AUTO: 29.3 PG (ref 26.5–33)
MCHC RBC AUTO-ENTMCNC: 34.8 G/DL (ref 31.5–36.5)
MCV RBC AUTO: 84 FL (ref 78–100)
PLATELET # BLD AUTO: 204 10E9/L (ref 150–450)
POTASSIUM SERPL-SCNC: 3.7 MMOL/L (ref 3.4–5.3)
RBC # BLD AUTO: 5.25 10E12/L (ref 4.4–5.9)
SODIUM SERPL-SCNC: 138 MMOL/L (ref 133–144)
WBC # BLD AUTO: 16.7 10E9/L (ref 4–11)

## 2020-07-19 PROCEDURE — 40000169 ZZH STATISTIC PRE-PROCEDURE ASSESSMENT I: Performed by: SURGERY

## 2020-07-19 PROCEDURE — 25000125 ZZHC RX 250: Performed by: SURGERY

## 2020-07-19 PROCEDURE — 25800030 ZZH RX IP 258 OP 636: Performed by: NURSE ANESTHETIST, CERTIFIED REGISTERED

## 2020-07-19 PROCEDURE — 37000008 ZZH ANESTHESIA TECHNICAL FEE, 1ST 30 MIN: Performed by: SURGERY

## 2020-07-19 PROCEDURE — 25800030 ZZH RX IP 258 OP 636: Performed by: ANESTHESIOLOGY

## 2020-07-19 PROCEDURE — 25000128 H RX IP 250 OP 636: Performed by: SURGERY

## 2020-07-19 PROCEDURE — 25800025 ZZH RX 258: Performed by: INTERNAL MEDICINE

## 2020-07-19 PROCEDURE — 0DNW4ZZ RELEASE PERITONEUM, PERCUTANEOUS ENDOSCOPIC APPROACH: ICD-10-PCS | Performed by: SURGERY

## 2020-07-19 PROCEDURE — 88307 TISSUE EXAM BY PATHOLOGIST: CPT | Performed by: SURGERY

## 2020-07-19 PROCEDURE — 80048 BASIC METABOLIC PNL TOTAL CA: CPT | Performed by: INTERNAL MEDICINE

## 2020-07-19 PROCEDURE — 25000128 H RX IP 250 OP 636: Performed by: ANESTHESIOLOGY

## 2020-07-19 PROCEDURE — 71000012 ZZH RECOVERY PHASE 1 LEVEL 1 FIRST HR: Performed by: SURGERY

## 2020-07-19 PROCEDURE — 12000000 ZZH R&B MED SURG/OB

## 2020-07-19 PROCEDURE — 25000566 ZZH SEVOFLURANE, EA 15 MIN: Performed by: SURGERY

## 2020-07-19 PROCEDURE — 36000063 ZZH SURGERY LEVEL 4 EA 15 ADDTL MIN: Performed by: SURGERY

## 2020-07-19 PROCEDURE — 36000093 ZZH SURGERY LEVEL 4 1ST 30 MIN: Performed by: SURGERY

## 2020-07-19 PROCEDURE — 27210794 ZZH OR GENERAL SUPPLY STERILE: Performed by: SURGERY

## 2020-07-19 PROCEDURE — 88307 TISSUE EXAM BY PATHOLOGIST: CPT | Mod: 26 | Performed by: SURGERY

## 2020-07-19 PROCEDURE — 25000125 ZZHC RX 250: Performed by: NURSE ANESTHETIST, CERTIFIED REGISTERED

## 2020-07-19 PROCEDURE — C9113 INJ PANTOPRAZOLE SODIUM, VIA: HCPCS | Performed by: INTERNAL MEDICINE

## 2020-07-19 PROCEDURE — 25000128 H RX IP 250 OP 636: Performed by: NURSE ANESTHETIST, CERTIFIED REGISTERED

## 2020-07-19 PROCEDURE — 25800030 ZZH RX IP 258 OP 636: Performed by: INTERNAL MEDICINE

## 2020-07-19 PROCEDURE — 25800025 ZZH RX 258: Performed by: SURGERY

## 2020-07-19 PROCEDURE — 36415 COLL VENOUS BLD VENIPUNCTURE: CPT | Performed by: INTERNAL MEDICINE

## 2020-07-19 PROCEDURE — 85027 COMPLETE CBC AUTOMATED: CPT | Performed by: INTERNAL MEDICINE

## 2020-07-19 PROCEDURE — 37000009 ZZH ANESTHESIA TECHNICAL FEE, EACH ADDTL 15 MIN: Performed by: SURGERY

## 2020-07-19 PROCEDURE — 0DB80ZZ EXCISION OF SMALL INTESTINE, OPEN APPROACH: ICD-10-PCS | Performed by: SURGERY

## 2020-07-19 PROCEDURE — 99232 SBSQ HOSP IP/OBS MODERATE 35: CPT | Performed by: INTERNAL MEDICINE

## 2020-07-19 PROCEDURE — 25000132 ZZH RX MED GY IP 250 OP 250 PS 637: Performed by: INTERNAL MEDICINE

## 2020-07-19 PROCEDURE — 44202 LAP ENTERECTOMY: CPT | Performed by: SURGERY

## 2020-07-19 PROCEDURE — 71000013 ZZH RECOVERY PHASE 1 LEVEL 1 EA ADDTL HR: Performed by: SURGERY

## 2020-07-19 PROCEDURE — 25000128 H RX IP 250 OP 636: Performed by: INTERNAL MEDICINE

## 2020-07-19 PROCEDURE — 99214 OFFICE O/P EST MOD 30 MIN: CPT | Mod: 57 | Performed by: SURGERY

## 2020-07-19 RX ORDER — PROPOFOL 10 MG/ML
INJECTION, EMULSION INTRAVENOUS CONTINUOUS PRN
Status: DISCONTINUED | OUTPATIENT
Start: 2020-07-19 | End: 2020-07-19

## 2020-07-19 RX ORDER — NALOXONE HYDROCHLORIDE 0.4 MG/ML
.1-.4 INJECTION, SOLUTION INTRAMUSCULAR; INTRAVENOUS; SUBCUTANEOUS
Status: DISCONTINUED | OUTPATIENT
Start: 2020-07-19 | End: 2020-07-24 | Stop reason: HOSPADM

## 2020-07-19 RX ORDER — ONDANSETRON 4 MG/1
4 TABLET, ORALLY DISINTEGRATING ORAL EVERY 30 MIN PRN
Status: DISCONTINUED | OUTPATIENT
Start: 2020-07-19 | End: 2020-07-19 | Stop reason: HOSPADM

## 2020-07-19 RX ORDER — ONDANSETRON 2 MG/ML
4 INJECTION INTRAMUSCULAR; INTRAVENOUS EVERY 30 MIN PRN
Status: DISCONTINUED | OUTPATIENT
Start: 2020-07-19 | End: 2020-07-19 | Stop reason: HOSPADM

## 2020-07-19 RX ORDER — BUPIVACAINE HYDROCHLORIDE AND EPINEPHRINE 2.5; 5 MG/ML; UG/ML
INJECTION, SOLUTION INFILTRATION; PERINEURAL PRN
Status: DISCONTINUED | OUTPATIENT
Start: 2020-07-19 | End: 2020-07-19 | Stop reason: HOSPADM

## 2020-07-19 RX ORDER — LIDOCAINE HYDROCHLORIDE 20 MG/ML
INJECTION, SOLUTION INFILTRATION; PERINEURAL PRN
Status: DISCONTINUED | OUTPATIENT
Start: 2020-07-19 | End: 2020-07-19

## 2020-07-19 RX ORDER — KETOROLAC TROMETHAMINE 30 MG/ML
30 INJECTION, SOLUTION INTRAMUSCULAR; INTRAVENOUS EVERY 6 HOURS PRN
Status: DISCONTINUED | OUTPATIENT
Start: 2020-07-19 | End: 2020-07-22

## 2020-07-19 RX ORDER — HYDRALAZINE HYDROCHLORIDE 20 MG/ML
INJECTION INTRAMUSCULAR; INTRAVENOUS PRN
Status: DISCONTINUED | OUTPATIENT
Start: 2020-07-19 | End: 2020-07-19

## 2020-07-19 RX ORDER — AZELASTINE 1 MG/ML
2 SPRAY, METERED NASAL 2 TIMES DAILY PRN
Status: DISCONTINUED | OUTPATIENT
Start: 2020-07-19 | End: 2020-07-24 | Stop reason: HOSPADM

## 2020-07-19 RX ORDER — SODIUM CHLORIDE, SODIUM LACTATE, POTASSIUM CHLORIDE, CALCIUM CHLORIDE 600; 310; 30; 20 MG/100ML; MG/100ML; MG/100ML; MG/100ML
INJECTION, SOLUTION INTRAVENOUS CONTINUOUS
Status: DISCONTINUED | OUTPATIENT
Start: 2020-07-19 | End: 2020-07-19 | Stop reason: HOSPADM

## 2020-07-19 RX ORDER — DIPHENHYDRAMINE HCL 25 MG
25 CAPSULE ORAL EVERY 6 HOURS PRN
Status: DISCONTINUED | OUTPATIENT
Start: 2020-07-19 | End: 2020-07-24 | Stop reason: HOSPADM

## 2020-07-19 RX ORDER — DEXAMETHASONE SODIUM PHOSPHATE 4 MG/ML
INJECTION, SOLUTION INTRA-ARTICULAR; INTRALESIONAL; INTRAMUSCULAR; INTRAVENOUS; SOFT TISSUE PRN
Status: DISCONTINUED | OUTPATIENT
Start: 2020-07-19 | End: 2020-07-19

## 2020-07-19 RX ORDER — HYDROMORPHONE HYDROCHLORIDE 1 MG/ML
0.5 INJECTION, SOLUTION INTRAMUSCULAR; INTRAVENOUS; SUBCUTANEOUS ONCE
Status: COMPLETED | OUTPATIENT
Start: 2020-07-19 | End: 2020-07-19

## 2020-07-19 RX ORDER — HYDROMORPHONE HYDROCHLORIDE 1 MG/ML
1 INJECTION, SOLUTION INTRAMUSCULAR; INTRAVENOUS; SUBCUTANEOUS
Status: DISCONTINUED | OUTPATIENT
Start: 2020-07-19 | End: 2020-07-20

## 2020-07-19 RX ORDER — KETOROLAC TROMETHAMINE 30 MG/ML
INJECTION, SOLUTION INTRAMUSCULAR; INTRAVENOUS PRN
Status: DISCONTINUED | OUTPATIENT
Start: 2020-07-19 | End: 2020-07-19

## 2020-07-19 RX ORDER — ALBUTEROL SULFATE 90 UG/1
2 AEROSOL, METERED RESPIRATORY (INHALATION) EVERY 6 HOURS PRN
Status: DISCONTINUED | OUTPATIENT
Start: 2020-07-19 | End: 2020-07-24 | Stop reason: HOSPADM

## 2020-07-19 RX ORDER — FENTANYL CITRATE 50 UG/ML
50-100 INJECTION, SOLUTION INTRAMUSCULAR; INTRAVENOUS
Status: DISCONTINUED | OUTPATIENT
Start: 2020-07-19 | End: 2020-07-19 | Stop reason: HOSPADM

## 2020-07-19 RX ORDER — FENTANYL CITRATE 50 UG/ML
25-50 INJECTION, SOLUTION INTRAMUSCULAR; INTRAVENOUS
Status: DISCONTINUED | OUTPATIENT
Start: 2020-07-19 | End: 2020-07-19 | Stop reason: HOSPADM

## 2020-07-19 RX ORDER — DIPHENHYDRAMINE HYDROCHLORIDE 50 MG/ML
25 INJECTION INTRAMUSCULAR; INTRAVENOUS EVERY 6 HOURS PRN
Status: DISCONTINUED | OUTPATIENT
Start: 2020-07-19 | End: 2020-07-24 | Stop reason: HOSPADM

## 2020-07-19 RX ORDER — ONDANSETRON 2 MG/ML
INJECTION INTRAMUSCULAR; INTRAVENOUS PRN
Status: DISCONTINUED | OUTPATIENT
Start: 2020-07-19 | End: 2020-07-19

## 2020-07-19 RX ORDER — NEOSTIGMINE METHYLSULFATE 1 MG/ML
VIAL (ML) INJECTION PRN
Status: DISCONTINUED | OUTPATIENT
Start: 2020-07-19 | End: 2020-07-19

## 2020-07-19 RX ORDER — GLYCOPYRROLATE 0.2 MG/ML
INJECTION, SOLUTION INTRAMUSCULAR; INTRAVENOUS PRN
Status: DISCONTINUED | OUTPATIENT
Start: 2020-07-19 | End: 2020-07-19

## 2020-07-19 RX ORDER — FENTANYL CITRATE 50 UG/ML
INJECTION, SOLUTION INTRAMUSCULAR; INTRAVENOUS PRN
Status: DISCONTINUED | OUTPATIENT
Start: 2020-07-19 | End: 2020-07-19

## 2020-07-19 RX ORDER — CEFOXITIN 2 G/1
2 INJECTION, POWDER, FOR SOLUTION INTRAVENOUS
Status: COMPLETED | OUTPATIENT
Start: 2020-07-19 | End: 2020-07-19

## 2020-07-19 RX ORDER — HYDROMORPHONE HYDROCHLORIDE 1 MG/ML
.3-.5 INJECTION, SOLUTION INTRAMUSCULAR; INTRAVENOUS; SUBCUTANEOUS EVERY 5 MIN PRN
Status: DISCONTINUED | OUTPATIENT
Start: 2020-07-19 | End: 2020-07-19 | Stop reason: HOSPADM

## 2020-07-19 RX ORDER — MAGNESIUM HYDROXIDE 1200 MG/15ML
LIQUID ORAL PRN
Status: DISCONTINUED | OUTPATIENT
Start: 2020-07-19 | End: 2020-07-19 | Stop reason: HOSPADM

## 2020-07-19 RX ORDER — PROPOFOL 10 MG/ML
INJECTION, EMULSION INTRAVENOUS PRN
Status: DISCONTINUED | OUTPATIENT
Start: 2020-07-19 | End: 2020-07-19

## 2020-07-19 RX ORDER — HYDRALAZINE HYDROCHLORIDE 20 MG/ML
5 INJECTION INTRAMUSCULAR; INTRAVENOUS ONCE
Status: COMPLETED | OUTPATIENT
Start: 2020-07-19 | End: 2020-07-19

## 2020-07-19 RX ORDER — NALOXONE HYDROCHLORIDE 0.4 MG/ML
.1-.4 INJECTION, SOLUTION INTRAMUSCULAR; INTRAVENOUS; SUBCUTANEOUS
Status: DISCONTINUED | OUTPATIENT
Start: 2020-07-19 | End: 2020-07-19

## 2020-07-19 RX ADMIN — ROCURONIUM BROMIDE 20 MG: 10 INJECTION INTRAVENOUS at 10:27

## 2020-07-19 RX ADMIN — HYDRALAZINE HYDROCHLORIDE 5 MG: 20 INJECTION INTRAMUSCULAR; INTRAVENOUS at 11:14

## 2020-07-19 RX ADMIN — Medication 1 LOZENGE: at 04:44

## 2020-07-19 RX ADMIN — SODIUM CHLORIDE, POTASSIUM CHLORIDE, SODIUM LACTATE AND CALCIUM CHLORIDE: 600; 310; 30; 20 INJECTION, SOLUTION INTRAVENOUS at 11:56

## 2020-07-19 RX ADMIN — DEXMEDETOMIDINE HYDROCHLORIDE 12 MCG: 100 INJECTION, SOLUTION INTRAVENOUS at 11:07

## 2020-07-19 RX ADMIN — FENTANYL CITRATE 50 MCG: 50 INJECTION, SOLUTION INTRAMUSCULAR; INTRAVENOUS at 09:58

## 2020-07-19 RX ADMIN — Medication: at 22:08

## 2020-07-19 RX ADMIN — ONDANSETRON 4 MG: 2 INJECTION INTRAMUSCULAR; INTRAVENOUS at 16:24

## 2020-07-19 RX ADMIN — ONDANSETRON 4 MG: 2 INJECTION INTRAMUSCULAR; INTRAVENOUS at 06:50

## 2020-07-19 RX ADMIN — PROPOFOL 50 MCG/KG/MIN: 10 INJECTION, EMULSION INTRAVENOUS at 10:17

## 2020-07-19 RX ADMIN — ROCURONIUM BROMIDE 50 MG: 10 INJECTION INTRAVENOUS at 10:16

## 2020-07-19 RX ADMIN — CEFOXITIN SODIUM 2 G: 2 POWDER, FOR SOLUTION INTRAVENOUS at 10:03

## 2020-07-19 RX ADMIN — HYDROMORPHONE HYDROCHLORIDE 1 MG: 1 INJECTION, SOLUTION INTRAMUSCULAR; INTRAVENOUS; SUBCUTANEOUS at 08:24

## 2020-07-19 RX ADMIN — HYDROMORPHONE HYDROCHLORIDE 1 MG: 1 INJECTION, SOLUTION INTRAMUSCULAR; INTRAVENOUS; SUBCUTANEOUS at 14:23

## 2020-07-19 RX ADMIN — LIDOCAINE HYDROCHLORIDE 100 MG: 20 INJECTION, SOLUTION INFILTRATION; PERINEURAL at 10:08

## 2020-07-19 RX ADMIN — HYDROMORPHONE HYDROCHLORIDE 0.5 MG: 1 INJECTION, SOLUTION INTRAMUSCULAR; INTRAVENOUS; SUBCUTANEOUS at 06:45

## 2020-07-19 RX ADMIN — PANTOPRAZOLE SODIUM 40 MG: 40 INJECTION, POWDER, FOR SOLUTION INTRAVENOUS at 08:27

## 2020-07-19 RX ADMIN — ROCURONIUM BROMIDE 20 MG: 10 INJECTION INTRAVENOUS at 11:01

## 2020-07-19 RX ADMIN — HYDRALAZINE HYDROCHLORIDE 5 MG: 20 INJECTION INTRAMUSCULAR; INTRAVENOUS at 11:19

## 2020-07-19 RX ADMIN — HYDROMORPHONE HYDROCHLORIDE 0.5 MG: 1 INJECTION, SOLUTION INTRAMUSCULAR; INTRAVENOUS; SUBCUTANEOUS at 00:48

## 2020-07-19 RX ADMIN — HYDROMORPHONE HYDROCHLORIDE 0.5 MG: 1 INJECTION, SOLUTION INTRAMUSCULAR; INTRAVENOUS; SUBCUTANEOUS at 12:37

## 2020-07-19 RX ADMIN — PROCHLORPERAZINE EDISYLATE 5 MG: 5 INJECTION INTRAMUSCULAR; INTRAVENOUS at 12:51

## 2020-07-19 RX ADMIN — HYDROMORPHONE HYDROCHLORIDE 0.5 MG: 1 INJECTION, SOLUTION INTRAMUSCULAR; INTRAVENOUS; SUBCUTANEOUS at 13:27

## 2020-07-19 RX ADMIN — HYDROMORPHONE HYDROCHLORIDE 1 MG: 1 INJECTION, SOLUTION INTRAMUSCULAR; INTRAVENOUS; SUBCUTANEOUS at 16:21

## 2020-07-19 RX ADMIN — HYDROMORPHONE HYDROCHLORIDE 1 MG: 1 INJECTION, SOLUTION INTRAMUSCULAR; INTRAVENOUS; SUBCUTANEOUS at 20:05

## 2020-07-19 RX ADMIN — PROPOFOL 200 MG: 10 INJECTION, EMULSION INTRAVENOUS at 10:08

## 2020-07-19 RX ADMIN — LIDOCAINE HYDROCHLORIDE 18 ML: 10 INJECTION, SOLUTION EPIDURAL; INFILTRATION; INTRACAUDAL; PERINEURAL at 11:55

## 2020-07-19 RX ADMIN — SODIUM CHLORIDE 1000 ML: 900 IRRIGANT IRRIGATION at 10:31

## 2020-07-19 RX ADMIN — SUCCINYLCHOLINE CHLORIDE 100 MG: 20 INJECTION, SOLUTION INTRAMUSCULAR; INTRAVENOUS; PARENTERAL at 10:09

## 2020-07-19 RX ADMIN — DIPHENHYDRAMINE HYDROCHLORIDE 25 MG: 50 INJECTION, SOLUTION INTRAMUSCULAR; INTRAVENOUS at 00:53

## 2020-07-19 RX ADMIN — DEXAMETHASONE SODIUM PHOSPHATE 4 MG: 4 INJECTION, SOLUTION INTRA-ARTICULAR; INTRALESIONAL; INTRAMUSCULAR; INTRAVENOUS; SOFT TISSUE at 10:26

## 2020-07-19 RX ADMIN — PROCHLORPERAZINE EDISYLATE 5 MG: 5 INJECTION INTRAMUSCULAR; INTRAVENOUS at 12:34

## 2020-07-19 RX ADMIN — HYDRALAZINE HYDROCHLORIDE 5 MG: 20 INJECTION INTRAMUSCULAR; INTRAVENOUS at 13:18

## 2020-07-19 RX ADMIN — SODIUM CHLORIDE: 9 INJECTION, SOLUTION INTRAVENOUS at 00:50

## 2020-07-19 RX ADMIN — ONDANSETRON 4 MG: 2 INJECTION INTRAMUSCULAR; INTRAVENOUS at 11:36

## 2020-07-19 RX ADMIN — DEXTROSE AND SODIUM CHLORIDE: 5; 450 INJECTION, SOLUTION INTRAVENOUS at 14:26

## 2020-07-19 RX ADMIN — HYDROMORPHONE HYDROCHLORIDE 1 MG: 1 INJECTION, SOLUTION INTRAMUSCULAR; INTRAVENOUS; SUBCUTANEOUS at 18:17

## 2020-07-19 RX ADMIN — HYDROMORPHONE HYDROCHLORIDE 0.5 MG: 1 INJECTION, SOLUTION INTRAMUSCULAR; INTRAVENOUS; SUBCUTANEOUS at 10:35

## 2020-07-19 RX ADMIN — NEOSTIGMINE METHYLSULFATE 5 MG: 1 INJECTION, SOLUTION INTRAVENOUS at 11:48

## 2020-07-19 RX ADMIN — DEXTROSE AND SODIUM CHLORIDE: 5; 450 INJECTION, SOLUTION INTRAVENOUS at 08:02

## 2020-07-19 RX ADMIN — HYDROMORPHONE HYDROCHLORIDE 0.5 MG: 1 INJECTION, SOLUTION INTRAMUSCULAR; INTRAVENOUS; SUBCUTANEOUS at 13:04

## 2020-07-19 RX ADMIN — DIPHENHYDRAMINE HYDROCHLORIDE 25 MG: 50 INJECTION, SOLUTION INTRAMUSCULAR; INTRAVENOUS at 06:54

## 2020-07-19 RX ADMIN — SODIUM CHLORIDE, POTASSIUM CHLORIDE, SODIUM LACTATE AND CALCIUM CHLORIDE: 600; 310; 30; 20 INJECTION, SOLUTION INTRAVENOUS at 08:59

## 2020-07-19 RX ADMIN — HYDROMORPHONE HYDROCHLORIDE 0.5 MG: 1 INJECTION, SOLUTION INTRAMUSCULAR; INTRAVENOUS; SUBCUTANEOUS at 02:43

## 2020-07-19 RX ADMIN — KETOROLAC TROMETHAMINE 30 MG: 30 INJECTION, SOLUTION INTRAMUSCULAR at 22:07

## 2020-07-19 RX ADMIN — GLYCOPYRROLATE 0.8 MG: 0.2 INJECTION, SOLUTION INTRAMUSCULAR; INTRAVENOUS at 11:48

## 2020-07-19 RX ADMIN — FENTANYL CITRATE 50 MCG: 50 INJECTION, SOLUTION INTRAMUSCULAR; INTRAVENOUS at 10:08

## 2020-07-19 RX ADMIN — PROCHLORPERAZINE EDISYLATE 10 MG: 5 INJECTION INTRAMUSCULAR; INTRAVENOUS at 20:09

## 2020-07-19 RX ADMIN — SODIUM CHLORIDE 1000 ML: 900 IRRIGANT IRRIGATION at 10:35

## 2020-07-19 RX ADMIN — FENTANYL CITRATE 50 MCG: 50 INJECTION, SOLUTION INTRAMUSCULAR; INTRAVENOUS at 12:16

## 2020-07-19 RX ADMIN — KETOROLAC TROMETHAMINE 15 MG: 30 INJECTION, SOLUTION INTRAMUSCULAR at 11:37

## 2020-07-19 RX ADMIN — DEXMEDETOMIDINE HYDROCHLORIDE 8 MCG: 100 INJECTION, SOLUTION INTRAVENOUS at 11:12

## 2020-07-19 RX ADMIN — HYDROMORPHONE HYDROCHLORIDE 0.5 MG: 1 INJECTION, SOLUTION INTRAMUSCULAR; INTRAVENOUS; SUBCUTANEOUS at 11:03

## 2020-07-19 RX ADMIN — BUPIVACAINE HYDROCHLORIDE AND EPINEPHRINE BITARTRATE 20 ML: 2.5; .005 INJECTION, SOLUTION EPIDURAL; INFILTRATION; INTRACAUDAL; PERINEURAL at 11:56

## 2020-07-19 RX ADMIN — HYDROMORPHONE HYDROCHLORIDE 0.5 MG: 1 INJECTION, SOLUTION INTRAMUSCULAR; INTRAVENOUS; SUBCUTANEOUS at 04:44

## 2020-07-19 ASSESSMENT — LIFESTYLE VARIABLES: TOBACCO_USE: 0

## 2020-07-19 ASSESSMENT — ACTIVITIES OF DAILY LIVING (ADL)
ADLS_ACUITY_SCORE: 12
ADLS_ACUITY_SCORE: 12
ADLS_ACUITY_SCORE: 14
ADLS_ACUITY_SCORE: 12
ADLS_ACUITY_SCORE: 12

## 2020-07-19 ASSESSMENT — COPD QUESTIONNAIRES: COPD: 0

## 2020-07-19 NOTE — ANESTHESIA PREPROCEDURE EVALUATION
Anesthesia Pre-Procedure Evaluation    Patient: Cash Cunningham   MRN: [id number] : 1980          Preoperative Diagnosis: Obstruction of bowel (H) [K56.609]    Procedure(s):  LAPAROSCOPY, DIAGNOSTIC, BY GENERAL SURGERY  COLECTOMY, LAPAROSCOPIC    Past Medical History:   Diagnosis Date     Depressive disorder     early      Hiatal hernia      Left groin hernia      Restless leg syndrome     uses Requip     Small bowel obstruction (H)      Past Surgical History:   Procedure Laterality Date     COLONOSCOPY       ESOPHAGOSCOPY, GASTROSCOPY, DUODENOSCOPY (EGD), COMBINED N/A 2019    Procedure: ESOPHAGOGASTRODUODENOSCOPY, WITH BIOPSY;  Surgeon: Jeremy Villareal MD;  Location:  GI     ESOPHAGOSCOPY, GASTROSCOPY, DUODENOSCOPY (EGD), COMBINED N/A 2020    Procedure: ESOPHAGOGASTRODUODENOSCOPY (EGD);  Surgeon: Bertram Coyne MD;  Location:  GI     REMOVE FISTULA ANAL      x2     SMALL BOWEL RESECTION       TONSILLECTOMY & ADENOIDECTOMY         Anesthesia Evaluation     . Pt has had prior anesthetic. Type: General    No history of anesthetic complications          ROS/MED HX    ENT/Pulmonary:      (-) tobacco use, asthma, COPD and sleep apnea   Neurologic:     (+)other neuro RLS    Cardiovascular:        (-) hypertension and CAD   METS/Exercise Tolerance:     Hematologic:  - neg hematologic  ROS       Musculoskeletal:         GI/Hepatic:     (+) hiatal hernia,      (-) liver disease   Renal/Genitourinary:      (-) renal disease   Endo:      (-) Type I DM and Type II DM   Psychiatric:     (+) psychiatric history depression      Infectious Disease:         Malignancy:         Other:                          Physical Exam  Normal systems: cardiovascular and pulmonary    Airway   Mallampati: II  TM distance: >3 FB  Neck ROM: full    Dental   (+) chipped  Comment: One chipped lower bicuspid otherwise normal per patient    Cardiovascular       Pulmonary             Lab Results   Component Value Date  "   WBC 8.2 07/18/2020    HGB 15.7 07/18/2020    HCT 45.6 07/18/2020     07/18/2020     07/18/2020    POTASSIUM 3.7 07/18/2020    CHLORIDE 108 07/18/2020    CO2 26 07/18/2020    BUN 11 07/18/2020    CR 1.03 07/18/2020     (H) 07/18/2020    ANGELLA 9.3 07/18/2020    ALBUMIN 4.2 07/18/2020    PROTTOTAL 7.3 07/18/2020    ALT 52 07/18/2020    AST 22 07/18/2020    ALKPHOS 68 07/18/2020    BILITOTAL 0.5 07/18/2020    LIPASE 163 07/18/2020       Preop Vitals  BP Readings from Last 3 Encounters:   07/19/20 (!) 141/93   03/09/20 135/71   02/12/20 112/63    Pulse Readings from Last 3 Encounters:   07/19/20 86   03/09/20 85   02/12/20 80      Resp Readings from Last 3 Encounters:   07/19/20 18   02/12/20 16   12/31/19 11    SpO2 Readings from Last 3 Encounters:   07/19/20 98%   03/09/20 96%   02/12/20 95%      Temp Readings from Last 1 Encounters:   07/19/20 36.4  C (97.6  F) (Oral)    Ht Readings from Last 1 Encounters:   07/18/20 1.803 m (5' 11\")      Wt Readings from Last 1 Encounters:   07/18/20 104.3 kg (230 lb)    Estimated body mass index is 32.08 kg/m  as calculated from the following:    Height as of this encounter: 1.803 m (5' 11\").    Weight as of this encounter: 104.3 kg (230 lb).       Anesthesia Plan      History & Physical Review  History and physical reviewed and following examination; no interval change.    ASA Status:  2 emergent.    NPO Status:  Waived due to emergency and > 8 hours    Plan for General with Intravenous induction. Maintenance will be Inhalation.    PONV prophylaxis:  Ondansetron (or other 5HT-3) and Dexamethasone or Solumedrol  RSI  NG tube suction before GA induction        Postoperative Care  Postoperative pain management:  IV analgesics.      Consents  Anesthetic plan, risks, benefits and alternatives discussed with:  Patient..                 Angel Gamboa MD  "

## 2020-07-19 NOTE — PLAN OF CARE
Pt to floor at 1400, alert and oriented, NG tube in place, dark brown output noted, carrasco in place with good urine output, abd dressing clear dry and intact, lungs clear, hypo bowel sounds, pt complains of 8 out of ten pain, pain medications given and pt sound asleep at recheck, pt NPO,

## 2020-07-19 NOTE — ANESTHESIA POSTPROCEDURE EVALUATION
Patient: Cash Cunningham    Procedure(s):   DIAGNOSTIC LAPAROSCOPY  COLECTOMY, LAPAROSCOPIC    Diagnosis:Obstruction of bowel (H) [K56.609]  Diagnosis Additional Information: No value filed.    Anesthesia Type:  General    Note:  Anesthesia Post Evaluation    Patient location during evaluation: PACU  Patient participation: Able to fully participate in evaluation  Level of consciousness: awake and alert  Pain management: adequate  Airway patency: patent  Cardiovascular status: acceptable  Respiratory status: acceptable  Hydration status: acceptable  PONV: controlled     Anesthetic complications: None    Comments: Significant post op nausea treated with Compazine. HTN treated with hydralazine with good success.         Last vitals:  Vitals:    07/19/20 1320 07/19/20 1327 07/19/20 1330   BP: (!) 174/90 (!) 161/83 (!) 160/82   Pulse: 80  95   Resp: 13  13   Temp: 37.7  C (99.9  F)  37.6  C (99.7  F)   SpO2: 93%  93%         Electronically Signed By: Angel Gamboa MD  July 19, 2020  1:34 PM

## 2020-07-19 NOTE — PROGRESS NOTES
MD Notification    Notified Person: MD    Notified Person Name: Katlyn    Notification Date/Time: July 19, 2020, 2:21 AM    Notification Interaction: pgd    Purpose of Notification: 605-1 (BB) Pt complaints of increased pain with SBO, PRN dilaudid given with minimal relief, please advise.     Orders Received: Orders given to place      Comments:

## 2020-07-19 NOTE — PROGRESS NOTES
Pt with increased abdominal pain tonight. Additional 0.5mg dilaudid IV once ordered. NG tube to LIS ordered     Virginia Potts MD

## 2020-07-19 NOTE — CONSULTS
[facility]  General Surgery Consultation         Sher Goodrich MD    Cash Cunningham MRN# [id number]   YOB: 1980 Age: 39 year old      Date of Admission:  7/18/2020  Date of Consult: 7/19/2020         Assessment and Plan:   Patient is a 39 year old male with small bowel obstruction    PLAN:  I have personally reviewed all imaging studies.  He appears to have a small bowel obstruction at his previous anastomosis.  This has been present multiple times in the past and he has responded to conservative therapy.  Overnight, he has developed increased abdominal pain.  An NG tube was placed with minimal relief.  Given ongoing symptoms and recurrent bowel obstruction, I would recommend going forward with exploratory laparoscopy, possible laparotomy, with possible bowel resection today. I discussed the risks, benefits, complications including but not limited to bleeding, infection, possible injury to the bowel or ureter, possible anastomotic dehiscence, possible postoperative MI, PE, or other anesthetic complications. If one of these complications did arise the patient may require additional procedures. The patient thoroughly understood the conversation and will sign consent.         Chief Complaint:     Chief Complaint   Patient presents with     Abdominal Pain          History of Present Illness:   Patient is a 39 year old male who I was asked to see by Dr. Mcadams for evaluation of small bowel obstruction.The patient describes having symptoms for the last 1 days.  He has had multiple obstructions in the past.  His most recent hospitalization was in February.  He was managed conservatively and declined surgery at that time.  The pain is mainly located in the RLQ area. The patient rates the pain a 8 out of 10 when he develops bowel spasms but is otherwise low.  An NG tube was placed overnight without significant relief.  He continues to have pain this AM. The pain is exacerbated by  "activity. The pain is relieved by rest.  Last bowel movement was 1 days ago. The patient does have nausea and vomiting. Patient denies fevers, chills, jaundice, changes in stool or urine, headache, SOB, chest pain.          Physical Exam:   Blood pressure (!) 141/93, pulse 86, temperature 97.6  F (36.4  C), temperature source Oral, resp. rate 18, height 1.803 m (5' 11\"), weight 104.3 kg (230 lb), SpO2 98 %.  230 lbs 0 oz  General: Generally appears well.  Psych: Alert and Oriented.  Normal affect  Neurological: grossly intact  Eyes: Sclera clear  Respiratory:  Lungs with good air excursion  Cardiovascular:  Normal peripheral pulses  GI: Abdomen Distended Mild tenderness to palpation RLQ No hernias palpated..  Lymphatic/Hematologic/Immune:  No obvious lymphadenopathy.  Integumentary:  No rashes       Past Medical History:     Past Medical History:   Diagnosis Date     Depressive disorder     early 20's     Hiatal hernia      Left groin hernia      Restless leg syndrome     uses Requip     Small bowel obstruction (H) 2019          Past Surgical History:     Past Surgical History:   Procedure Laterality Date     COLONOSCOPY       ESOPHAGOSCOPY, GASTROSCOPY, DUODENOSCOPY (EGD), COMBINED N/A 12/31/2019    Procedure: ESOPHAGOGASTRODUODENOSCOPY, WITH BIOPSY;  Surgeon: Jeremy Villareal MD;  Location:  GI     ESOPHAGOSCOPY, GASTROSCOPY, DUODENOSCOPY (EGD), COMBINED N/A 2/12/2020    Procedure: ESOPHAGOGASTRODUODENOSCOPY (EGD);  Surgeon: Bertram Coyne MD;  Location:  GI     REMOVE FISTULA ANAL      x2     SMALL BOWEL RESECTION  2010     TONSILLECTOMY & ADENOIDECTOMY            Current Medications:           [Auto Hold] cefOXitin  2 g Intravenous On Call to OR     [Auto Hold] pantoprazole (PROTONIX) IV  40 mg Intravenous Daily with breakfast     sodium chloride (PF)  3 mL Intracatheter Q8H     [Auto Hold] sodium chloride (PF)  3 mL Intracatheter Q8H     [Auto Hold] acetaminophen, [Auto Hold] sore throat lozenge, [Auto " Hold] calcium carbonate, [Auto Hold] diphenhydrAMINE **OR** [Auto Hold] diphenhydrAMINE, [Auto Hold] HYDROmorphone, [Auto Hold] lidocaine 4%, lidocaine (buffered or not buffered), [Auto Hold] lidocaine (buffered or not buffered), [Auto Hold] magnesium sulfate, [Auto Hold] melatonin, [Auto Hold] naloxone, [Auto Hold] ondansetron **OR** [Auto Hold] ondansetron, [Auto Hold] oxyCODONE, [Auto Hold] phenol, [Auto Hold] potassium chloride, [Auto Hold] potassium chloride with lidocaine, [Auto Hold] potassium chloride, [Auto Hold] potassium chloride, [Auto Hold] potassium chloride, [Auto Hold] prochlorperazine **OR** [Auto Hold] prochlorperazine **OR** [Auto Hold] prochlorperazine, [Auto Hold] sodium chloride (PF)       Home Medications:     Prior to Admission medications    Medication Sig Last Dose Taking? Auth Provider   albuterol (PROAIR HFA/PROVENTIL HFA/VENTOLIN HFA) 108 (90 Base) MCG/ACT inhaler Inhale 2 puffs into the lungs every 6 hours as needed for shortness of breath / dyspnea or wheezing  Yes Unknown, Entered By History   amitriptyline (ELAVIL) 25 MG tablet Take 25 mg by mouth At Bedtime 7/17/2020 at Unknown time Yes Unknown, Entered By History   azelastine (ASTELIN) 0.1 % nasal spray Spray 1-2 sprays into both nostrils 2 times daily as needed   Yes Unknown, Entered By History   finasteride (PROPECIA) 1 MG tablet Take 1 mg by mouth daily 7/18/2020 at Unknown time Yes Unknown, Entered By History   ketotifen (ZADITOR/REFRESH ANTI-ITCH) 0.025 % ophthalmic solution Place 1 drop into both eyes 2 times daily as needed   Yes Unknown, Entered By History   Magnesium Oxide 250 MG TABS Take 500 mg by mouth daily 7/18/2020 at Unknown time Yes Unknown, Entered By History   omeprazole 20 MG tablet Take 20 mg by mouth daily 7/17/2020 at Unknown time Yes Unknown, Entered By History   rOPINIRole (REQUIP) 1 MG tablet Take 1 mg by mouth At Bedtime 7/17/2020 at Unknown time Yes Unknown, Entered By History          Allergies:      Allergies   Allergen Reactions     Cat Hair Extract Itching     eye lids may swell     Morphine Itching     Trazodone Other (See Comments)     Muscle spasms          Family History:   History reviewed. No pertinent family history.      Social History:   Cash Cunningham  reports that he has never smoked. He has never used smokeless tobacco. He reports previous alcohol use. He reports that he does not use drugs.        Review of Systems:   The 12 point Review of Systems is negative other than noted in the HPI.       Labs/Imaging   All new lab and imaging data was reviewed.   Sher Goodrich M.D.  [facility]

## 2020-07-19 NOTE — PROGRESS NOTES
[facility]    HOSPITALIST PROGRESS NOTE :   --------------------------------------------------    Date of Admission:  7/18/2020    Cumulative Summary: Cash Cunningham is a 39 year old male with past medical history significant for Meckel's diverticulum for which he has undergone resection and reanastomosis in 2010 through [facility] in [place].  Since then he has recurrent episodes of abdominal pain associated with prior small bowel obstructions, usually responding to conservative management.  He developed similar abdominal discomfort on July 18 and presented to the ER for further evaluation where he was found to have small bowel obstruction and was admitted for further evaluation and management.    Assessment & Plan     Small bowel obstruction (H) (11/12/2019): His past medical history is significant for Meckel's diverticulum for which he has undergone resection and reanastomosis in 2010 through [facility] in [place].   Since then patient has recurrent small bowel obstructions, almost couple of episodes per year till 2016.    His episodes resolved for few years but then he developed episode of SBO in November 2019, February 2020 and now presented with similar presentation.  His CT scan is concerning for a small bowel obstruction with transition point at the small bowel anastomosis in the mid/lower abdomen.  Findings are similar but less severe as compared to February 2020. There was also mild mesenteric edema and free fluid.  Of note, patient usual symptoms are more consistent with abdominal pain rather than nausea and vomiting and he has not required NG tube placement in the past.    --Patient was admitted for further evaluation, IV pain medications and nausea medications were ordered, over the nighttime, patient received almost 3 doses of IV Dilaudid with 3 doses of IV Benadryl, 3 doses of Zofran, 1 dose of oxycodone and 1 dose of Compazine but continued to have worsening of his  symptoms.  --Patient was taken to the OR this morning by general surgery for exploratory laparoscopy, laparoscopic lysis of adhesions and laparoscopic assisted small bowel resection.  He was found to have obstruction at previous anastomotic site.  --Patient was seen and examined as he returned to the floor after surgery, patient does have NG tube in place, slightly sleepy but wakes up on verbal stimuli.  -- continue IV protonix  --Continue IV pain and nausea medications.  --Continue Ringer lactate, after that patient can be started on D5 half-normal saline.  --Discussed the plan of care with bedside RN.    Restless leg syndrome  When patient is able to advance diet and tolerate oral medications then will resume his PTA Requip.    History of anxiety and depression  Plan to start patient back on PTA Cymbalta once patient is able to tolerate oral medications.    Diet: NPO per Anesthesia Guidelines for Procedure/Surgery Except for: Meds    [place] Catheter: in place, indication: Other (Comment)(DIAGNOSTIC LAPAROSCOPY, LAPAROSCOPIC COLECTOMY)  DVT Prophylaxis: Pneumatic Compression Devices  Code Status: Full Code    The patient's care was discussed with the Bedside Nurse and Patient.    Disposition Plan   Expected discharge: 2 - 3 days, once patient is starts to make some clinical improvement.    Shona Mcadams MD, FACP  Text Page (7am - 6pm)    ----------------------------------------------------------------------------------------------------------------------    Interval History   Patient was seen and examined, came back from PACU, underwent exploratory laparotomy this morning.  He is having some pain, NG tube is in place, discussed with bedside nursing regarding availability of IV pain and nausea medications, plan to continue patient on IV fluids he is going to be n.p.o.  He is otherwise denying any chest pain, dizziness or palpitations.    -Data reviewed today: I reviewed all new labs and imaging results over the last  24 hours.    I personally reviewed no images or EKG's today.    Physical Exam   Temp: 97.6  F (36.4  C) Temp src: Oral BP: (!) 141/93 Pulse: 86 Heart Rate: 65 Resp: 18 SpO2: 98 % O2 Device: None (Room air)    Vitals:    07/18/20 1251   Weight: 104.3 kg (230 lb)     Vital Signs with Ranges  Temp:  [97.4  F (36.3  C)-98.3  F (36.8  C)] 97.6  F (36.4  C)  Pulse:  [65-86] 86  Heart Rate:  [61-81] 65  Resp:  [16-22] 18  BP: (118-156)/(53-94) 141/93  SpO2:  [91 %-99 %] 98 %  No intake/output data recorded.    GENERAL: Alert , awake and oriented. NAD. Conversational, appropriate.   HEENT: Normocephalic. EOMI. No icterus or injection. Nares normal.   LUNGS: Clear to auscultation. No dyspnea at rest.   HEART: Regular rate. Extremities perfused.   ABDOMEN: Soft, generalized tenderness, surgical dressing in place, hypoactive bowel sounds  EXTREMITIES: No LE edema noted.   NEUROLOGIC: Moves extremities x4 on command. No acute focal neurologic abnormalities noted.     Medications     dextrose 5% and 0.45% NaCl 100 mL/hr at 07/19/20 0802     lactated ringers 25 mL/hr at 07/19/20 0859     lactated ringers       Another Antibiotic has been ordered.         [Auto Hold] pantoprazole (PROTONIX) IV  40 mg Intravenous Daily with breakfast     sodium chloride (PF)  3 mL Intracatheter Q8H     [Auto Hold] sodium chloride (PF)  3 mL Intracatheter Q8H       Data   Recent Labs   Lab 07/18/20  1312   WBC 8.2   HGB 15.7   MCV 84         POTASSIUM 3.7   CHLORIDE 108   CO2 26   BUN 11   CR 1.03   ANIONGAP 6   ANGELLA 9.3   *   ALBUMIN 4.2   PROTTOTAL 7.3   BILITOTAL 0.5   ALKPHOS 68   ALT 52   AST 22   LIPASE 163       Imaging:   Recent Results (from the past 24 hour(s))   CT Abdomen Pelvis w Contrast    Narrative    CT ABDOMEN PELVIS W CONTRAST 7/18/2020 1:45 PM    CLINICAL HISTORY: check for SBO, abdominal pain and vomiting    TECHNIQUE: CT scan of the abdomen and pelvis was performed following  injection of IV contrast.  Multiplanar reformats were obtained. Dose  reduction techniques were used.  CONTRAST: 115mL Isovue-370    COMPARISON: CT abdomen and pelvis 2/11/2020    FINDINGS:   LOWER CHEST: Normal.    HEPATOBILIARY: Focal fat near the falciform. No calcified gallstones  or biliary dilation.    PANCREAS: No acute peripancreatic inflammatory changes.    SPLEEN: Normal.    ADRENAL GLANDS: Normal.    KIDNEYS/BLADDER: Both kidneys enhance symmetrically. No  hydronephrosis. Subcentimeter hypodensities in both kidneys, most  likely representing cysts, do not require further imaging evaluation.  The bladder is decompressed.    BOWEL: Tiny sliding hiatal hernia. Gastric distention. Again seen are  several loops of mildly dilated small bowel with transition point at  an enteroenterostomy in the lower abdomen this is overall similar but  less extensive compared to 2/11/2020. There is fecalization of the  intraluminal contents proximal to the anastomosis. Again noted is mild  mesenteric edema and fluid. No bowel wall pneumatosis. Normal  appendix. Normal appearance to the colon.    PELVIC ORGANS: Normal.    LYMPH NODES: No abdominopelvic lymphadenopathy.    ADDITIONAL FINDINGS: Trace free fluid. No intraperitoneal free air. No  discrete drainable fluid collections in the abdomen or pelvis.    MUSCULOSKELETAL: No significant osseous abnormality.      Impression    IMPRESSION:   1.  Small bowel obstruction with transition point at the small bowel  anastomosis in the mid/lower abdomen. Findings are similar, but less  severe compared to 2/11/2020.  2.  Mild mesenteric edema and free fluid. No discrete drainable fluid  collections or intraperitoneal free air.    MAHOGANY DELGADO MD

## 2020-07-19 NOTE — PLAN OF CARE
[id number]:   VSS. A&O. Pain managed w/ IV dilaudid. NG to LIS. Pt brought down to OR for procedure at 0830.     Pt to return to station 33. Belongings brought by NA to station 33.

## 2020-07-19 NOTE — PLAN OF CARE
DATE & TIME: 7/18 1900-0730    Cognitive Concerns/ Orientation : A/Ox4  BEHAVIOR & AGGRESSION TOOL COLOR: green  CIWA SCORE: NA  ABNL VS/O2: NA  MOBILITY: ind  PAIN MANAGMENT: PRN dilaudid given for abd pain  DIET: NPO except meds and ice chips  BOWEL/BLADDER: continent  ABNL LAB/BG: NA  DRAIN/DEVICES: PIV infusing, NG to low intermittent suction   TELEMETRY RHYTHM: NA  SKIN: WDL   TESTS/PROCEDURES: NA  D/C DAY/GOALS/PLACE: Pending progress  OTHER IMPORTANT INFO: Surgery will round in AM and solidify plan

## 2020-07-19 NOTE — OP NOTE
PREOPERATIVE DIAGNOSIS: Small bowel obstruction      POSTOPERATIVE DIAGNOSIS: Small bowel obstruction      PROCEDURE:   1-Exploratory laparoscopy        2-Laparoscopic lysis of adhesions        3-Laparoscopic assisted small bowel resection.      SURGEON:  Sher Goodrich MD       ASSISTANT: Benigno Ambrose [employer] [facility] [employer] Resident      ANESTHESIA:  GET.       COMPLICATIONS:  None.       BLOOD LOSS:  10 cc      FINDINGS:  Obstruction at previous anastomosis.       INDICATIONS: Mr. Cunningham presented with recurrent small bowel obstruction.  He has a history of small bowel resection in 2010 and has been hospitalized multiple times for a obstruction at his anastomosis.  He has never required surgery but presents with more abdominal pain today.  Given ongoing symptoms and pain I would recommend going forward with exploration today.  I explained the risks, benefits, complications including but not limited to bleeding, infection, possible postop hematoma, seroma, bowel or bladder injury, anastomotic dehiscence, ureter injury and need for additional procedures if any of these occur.  The patient agreed and did sign consent.       DETAILS OF PROCEDURE:  The patient was brought to the operating room per Anesthesia, placed in supine position, intubated without difficulty.  Patient was prepped and draped in the usual fashion using ChloraPrep. Surgical timeout was then performed to identify correct surgeon, site, procedure and patient.  All in the room were in agreement. A Salinas catheter and NG tube were placed. A small incision was made in the left upper quadrant.  A 0 camera was used with a 5 mm trocar to gain access using Optiview technique.  This was done with out difficulty.  The abdomen was insufflated to a pressure of 15 which the patient tolerated well.  2 additional 5 mm were placed in the left quadrant under direct visualization.  The bowel was ran from the ligament of Treitz where the bowel was very dilated.  This continued down  until the distal ileum where a anastomosis was found.  There was a distinct transition from very dilated bowel to completely decompressed bowel at the anastomosis.  I ran the decompressed bowel into the cecum without other findings.  Adhesions were taken down with the LigaSure device without bleeding.  The bowel was then grasped.  A skin incision was made through his previous incision site.  This was carried through the subcutaneous tissue with cautery.  An Genaro wound retractor was applied.  The bowel was eviscerated revealing the obstruction site at the old anastomosis.  A mesenteric window was created at the proximal and distal end of the anastomosis.  This was created with a KUSUM 70 blue load.  The LigaSure was then used to take down the mesentery which had multiple adhesions and was quite thickened.  Larger vessels were oversewn with a 3-0 Vicryl.  A functional end to end side to side and ecchymosis was created with a KUSUM 70 blue load.  The internal anastomosis was inspected to be completely open without bleeding.  The top was sealed with additional KUSUM blue loads.  There was some bleeding at the staple lines which were oversewn with multiple 3-0 Vicryl's.  The mesenteric defect was closed with a 3-0 Vicryl in a running fashion.  Crotch sutures were placed with multiple 3-0 Vicryl's as well.  He had a stenosis was palpated multiple times revealing that was widely patent and open.  There was no bleeding.  This was placed intra-abdominally.  The camera was once again used showing the anastomosis laying in appropriate position.  There was no bleeding or abnormal fluid collections.  The omentum was placed over the anastomosis.  The trocars were removed under direct visualization.  We then converted to a clean closing tray.  The fascia was closed with a 0 PDS starting cephalad and caudad and tying in the middle.  Marcaine 0.25% injected to all the wounds.  The wounds were copiously irrigated with saline. The skin  was then trimmed to excise his old scar and approximated with 3-0 Vicryl's.  The final skin layer was closed with 4-0 Monocryl in subcuticular fashion.  All sponge, instrument and needle counts were correct.  The patient tolerated the procedure well and transferred to PACU in stable condition.  A physician assistant was necessary for patient positioning, prepping, operating the camera, aiding in retraction and exposure, performing the anastomosis and closure.           KALYAN KHAN MD

## 2020-07-19 NOTE — ANESTHESIA CARE TRANSFER NOTE
Patient: Cash Cunningham    Procedure(s):   DIAGNOSTIC LAPAROSCOPY  COLECTOMY, LAPAROSCOPIC    Diagnosis: Obstruction of bowel (H) [K56.609]  Diagnosis Additional Information: No value filed.    Anesthesia Type:   General     Note:  Airway :Face Mask and Other (See Comments) (pre-existing NG still in place)  Patient transferred to:PACU  Handoff Report: Identifed the Patient, Identified the Reponsible Provider, Reviewed the pertinent medical history, Discussed the surgical course, Reviewed Intra-OP anesthesia mangement and issues during anesthesia, Set expectations for post-procedure period and Allowed opportunity for questions and acknowledgement of understanding      Vitals: (Last set prior to Anesthesia Care Transfer)    CRNA VITALS  7/19/2020 1149 - 7/19/2020 1224      7/19/2020             Resp Rate (set):  10                Electronically Signed By: ARACELI Mariee CRNA  July 19, 2020  12:24 PM

## 2020-07-19 NOTE — BRIEF OP NOTE
[facility]    Brief Operative Note    Pre-operative diagnosis: Obstruction of bowel (H) [K56.609]  Post-operative diagnosis: Small bowel obstruction, ileal anastomosis    Procedure(s):   DIAGNOSTIC LAPAROSCOPY  COLECTOMY, LAPAROSCOPIC  Surgeon(s) and Role:     * Sher Goodrich MD - Primary     * Benigno Ambrose MD - Resident - Assisting     Anesthesia: General     Estimated blood loss: 10 mL    Drains: None    Specimens:   ID Type Source Tests Collected by Time Destination   A : SMALL BOWEL Tissue Small Intestine, Ileum SURGICAL PATHOLOGY EXAM Sher Goodrich MD 7/19/2020 11:20 AM      Findings: Segment of previous ileal anastomosis appeared injected without evidence of ischemia or necrosis was identified laparoscopically. Midline vertical incision created and bowel pulled through. Anastomosis resected and new anastomosis fashioned side-to-side with widely patent anastomosis. Roughly 15 cm ileum resected.      Complications: None     Implants: * No implants in log *     Post-op plan:   - Continue NG to LIS  - NPO today, OK for meds with 30 minutes of clamping after meds down NG  - OK to transfer to [facility] primary    Benigno Ambrose MD  General Surgery Resident, PGY-4  [phone]

## 2020-07-20 ENCOUNTER — APPOINTMENT (OUTPATIENT)
Dept: GENERAL RADIOLOGY | Facility: CLINIC | Age: 40
DRG: 331 | End: 2020-07-20
Attending: PHYSICIAN ASSISTANT
Payer: COMMERCIAL

## 2020-07-20 LAB
ALBUMIN UR-MCNC: 30 MG/DL
ANION GAP SERPL CALCULATED.3IONS-SCNC: 6 MMOL/L (ref 3–14)
APPEARANCE UR: CLEAR
BASOPHILS # BLD AUTO: 0 10E9/L (ref 0–0.2)
BASOPHILS NFR BLD AUTO: 0.2 %
BILIRUB UR QL STRIP: NEGATIVE
BUN SERPL-MCNC: 13 MG/DL (ref 7–30)
CALCIUM SERPL-MCNC: 7.8 MG/DL (ref 8.5–10.1)
CHLORIDE SERPL-SCNC: 105 MMOL/L (ref 94–109)
CO2 SERPL-SCNC: 26 MMOL/L (ref 20–32)
COLOR UR AUTO: YELLOW
CREAT SERPL-MCNC: 1.01 MG/DL (ref 0.66–1.25)
DIFFERENTIAL METHOD BLD: NORMAL
EOSINOPHIL # BLD AUTO: 0 10E9/L (ref 0–0.7)
EOSINOPHIL NFR BLD AUTO: 0 %
ERYTHROCYTE [DISTWIDTH] IN BLOOD BY AUTOMATED COUNT: 13.9 % (ref 10–15)
GFR SERPL CREATININE-BSD FRML MDRD: >90 ML/MIN/{1.73_M2}
GLUCOSE SERPL-MCNC: 133 MG/DL (ref 70–99)
GLUCOSE UR STRIP-MCNC: NEGATIVE MG/DL
HCT VFR BLD AUTO: 40.9 % (ref 40–53)
HGB BLD-MCNC: 13.8 G/DL (ref 13.3–17.7)
HGB UR QL STRIP: ABNORMAL
IMM GRANULOCYTES # BLD: 0 10E9/L (ref 0–0.4)
IMM GRANULOCYTES NFR BLD: 0.2 %
KETONES UR STRIP-MCNC: NEGATIVE MG/DL
LACTATE BLD-SCNC: 0.7 MMOL/L (ref 0.7–2)
LEUKOCYTE ESTERASE UR QL STRIP: NEGATIVE
LYMPHOCYTES # BLD AUTO: 0.9 10E9/L (ref 0.8–5.3)
LYMPHOCYTES NFR BLD AUTO: 15.5 %
MCH RBC QN AUTO: 28.9 PG (ref 26.5–33)
MCHC RBC AUTO-ENTMCNC: 33.7 G/DL (ref 31.5–36.5)
MCV RBC AUTO: 86 FL (ref 78–100)
MONOCYTES # BLD AUTO: 0.8 10E9/L (ref 0–1.3)
MONOCYTES NFR BLD AUTO: 14.1 %
MUCOUS THREADS #/AREA URNS LPF: PRESENT /LPF
NEUTROPHILS # BLD AUTO: 3.9 10E9/L (ref 1.6–8.3)
NEUTROPHILS NFR BLD AUTO: 70 %
NITRATE UR QL: NEGATIVE
NRBC # BLD AUTO: 0 10*3/UL
NRBC BLD AUTO-RTO: 0 /100
PH UR STRIP: 6 PH (ref 5–7)
PLATELET # BLD AUTO: 184 10E9/L (ref 150–450)
POTASSIUM SERPL-SCNC: 3.1 MMOL/L (ref 3.4–5.3)
POTASSIUM SERPL-SCNC: 3.3 MMOL/L (ref 3.4–5.3)
POTASSIUM SERPL-SCNC: 3.4 MMOL/L (ref 3.4–5.3)
PROCALCITONIN SERPL-MCNC: 0.24 NG/ML
RBC # BLD AUTO: 4.78 10E12/L (ref 4.4–5.9)
RBC #/AREA URNS AUTO: 53 /HPF (ref 0–2)
SODIUM SERPL-SCNC: 137 MMOL/L (ref 133–144)
SOURCE: ABNORMAL
SP GR UR STRIP: 1.03 (ref 1–1.03)
UROBILINOGEN UR STRIP-MCNC: NORMAL MG/DL (ref 0–2)
WBC # BLD AUTO: 5.6 10E9/L (ref 4–11)
WBC #/AREA URNS AUTO: 4 /HPF (ref 0–5)

## 2020-07-20 PROCEDURE — 25000132 ZZH RX MED GY IP 250 OP 250 PS 637: Performed by: SURGERY

## 2020-07-20 PROCEDURE — 25000128 H RX IP 250 OP 636: Performed by: INTERNAL MEDICINE

## 2020-07-20 PROCEDURE — 83605 ASSAY OF LACTIC ACID: CPT | Performed by: SURGERY

## 2020-07-20 PROCEDURE — 36415 COLL VENOUS BLD VENIPUNCTURE: CPT | Performed by: INTERNAL MEDICINE

## 2020-07-20 PROCEDURE — 25000128 H RX IP 250 OP 636: Performed by: SURGERY

## 2020-07-20 PROCEDURE — 84132 ASSAY OF SERUM POTASSIUM: CPT | Performed by: SURGERY

## 2020-07-20 PROCEDURE — 71046 X-RAY EXAM CHEST 2 VIEWS: CPT

## 2020-07-20 PROCEDURE — 87040 BLOOD CULTURE FOR BACTERIA: CPT | Performed by: INTERNAL MEDICINE

## 2020-07-20 PROCEDURE — 81001 URINALYSIS AUTO W/SCOPE: CPT | Performed by: PHYSICIAN ASSISTANT

## 2020-07-20 PROCEDURE — 85025 COMPLETE CBC W/AUTO DIFF WBC: CPT | Performed by: PHYSICIAN ASSISTANT

## 2020-07-20 PROCEDURE — 25000128 H RX IP 250 OP 636: Performed by: PHYSICIAN ASSISTANT

## 2020-07-20 PROCEDURE — 25000132 ZZH RX MED GY IP 250 OP 250 PS 637: Performed by: INTERNAL MEDICINE

## 2020-07-20 PROCEDURE — 80048 BASIC METABOLIC PNL TOTAL CA: CPT | Performed by: PHYSICIAN ASSISTANT

## 2020-07-20 PROCEDURE — 36415 COLL VENOUS BLD VENIPUNCTURE: CPT | Performed by: PHYSICIAN ASSISTANT

## 2020-07-20 PROCEDURE — 84145 PROCALCITONIN (PCT): CPT | Performed by: SURGERY

## 2020-07-20 PROCEDURE — 99233 SBSQ HOSP IP/OBS HIGH 50: CPT | Performed by: PHYSICIAN ASSISTANT

## 2020-07-20 PROCEDURE — 25000132 ZZH RX MED GY IP 250 OP 250 PS 637: Performed by: PHYSICIAN ASSISTANT

## 2020-07-20 PROCEDURE — 36415 COLL VENOUS BLD VENIPUNCTURE: CPT | Performed by: SURGERY

## 2020-07-20 PROCEDURE — C9113 INJ PANTOPRAZOLE SODIUM, VIA: HCPCS | Performed by: INTERNAL MEDICINE

## 2020-07-20 PROCEDURE — 12000000 ZZH R&B MED SURG/OB

## 2020-07-20 PROCEDURE — 25800025 ZZH RX 258: Performed by: INTERNAL MEDICINE

## 2020-07-20 RX ORDER — ACETAMINOPHEN 325 MG/1
650 TABLET ORAL EVERY 4 HOURS PRN
Status: DISCONTINUED | OUTPATIENT
Start: 2020-07-20 | End: 2020-07-24 | Stop reason: HOSPADM

## 2020-07-20 RX ORDER — PIPERACILLIN SODIUM, TAZOBACTAM SODIUM 3; .375 G/15ML; G/15ML
3.38 INJECTION, POWDER, LYOPHILIZED, FOR SOLUTION INTRAVENOUS EVERY 6 HOURS
Status: DISCONTINUED | OUTPATIENT
Start: 2020-07-20 | End: 2020-07-22

## 2020-07-20 RX ORDER — FLUORIDE TOOTHPASTE
15 TOOTHPASTE DENTAL 4 TIMES DAILY PRN
Status: DISCONTINUED | OUTPATIENT
Start: 2020-07-20 | End: 2020-07-24 | Stop reason: HOSPADM

## 2020-07-20 RX ORDER — DIPHENHYDRAMINE HCL 12.5MG/5ML
25 LIQUID (ML) ORAL EVERY 4 HOURS PRN
Status: DISCONTINUED | OUTPATIENT
Start: 2020-07-20 | End: 2020-07-24 | Stop reason: HOSPADM

## 2020-07-20 RX ORDER — ACETAMINOPHEN 650 MG/1
650 SUPPOSITORY RECTAL EVERY 4 HOURS PRN
Status: DISCONTINUED | OUTPATIENT
Start: 2020-07-20 | End: 2020-07-24 | Stop reason: HOSPADM

## 2020-07-20 RX ADMIN — KETOROLAC TROMETHAMINE 30 MG: 30 INJECTION, SOLUTION INTRAMUSCULAR at 11:29

## 2020-07-20 RX ADMIN — PIPERACILLIN SODIUM AND TAZOBACTAM SODIUM 3.38 G: 3; .375 INJECTION, POWDER, LYOPHILIZED, FOR SOLUTION INTRAVENOUS at 23:41

## 2020-07-20 RX ADMIN — KETOROLAC TROMETHAMINE 30 MG: 30 INJECTION, SOLUTION INTRAMUSCULAR at 05:39

## 2020-07-20 RX ADMIN — ACETAMINOPHEN 650 MG: 325 TABLET, FILM COATED ORAL at 16:07

## 2020-07-20 RX ADMIN — DIPHENHYDRAMINE HYDROCHLORIDE 25 MG: 25 CAPSULE ORAL at 11:29

## 2020-07-20 RX ADMIN — DIPHENHYDRAMINE HYDROCHLORIDE 25 MG: 25 SOLUTION ORAL at 17:30

## 2020-07-20 RX ADMIN — POTASSIUM CHLORIDE 20 MEQ: 1500 TABLET, EXTENDED RELEASE ORAL at 10:08

## 2020-07-20 RX ADMIN — PIPERACILLIN SODIUM AND TAZOBACTAM SODIUM 3.38 G: 3; .375 INJECTION, POWDER, LYOPHILIZED, FOR SOLUTION INTRAVENOUS at 17:36

## 2020-07-20 RX ADMIN — KETOROLAC TROMETHAMINE 30 MG: 30 INJECTION, SOLUTION INTRAMUSCULAR at 23:53

## 2020-07-20 RX ADMIN — Medication 1 LOZENGE: at 05:41

## 2020-07-20 RX ADMIN — POTASSIUM CHLORIDE 40 MEQ: 1500 TABLET, EXTENDED RELEASE ORAL at 08:18

## 2020-07-20 RX ADMIN — PANTOPRAZOLE SODIUM 40 MG: 40 INJECTION, POWDER, FOR SOLUTION INTRAVENOUS at 08:18

## 2020-07-20 RX ADMIN — KETOROLAC TROMETHAMINE 30 MG: 30 INJECTION, SOLUTION INTRAMUSCULAR at 17:30

## 2020-07-20 RX ADMIN — DEXTROSE AND SODIUM CHLORIDE: 5; 450 INJECTION, SOLUTION INTRAVENOUS at 19:26

## 2020-07-20 RX ADMIN — DIPHENHYDRAMINE HYDROCHLORIDE 25 MG: 25 SOLUTION ORAL at 21:33

## 2020-07-20 ASSESSMENT — ACTIVITIES OF DAILY LIVING (ADL)
ADLS_ACUITY_SCORE: 14

## 2020-07-20 ASSESSMENT — MIFFLIN-ST. JEOR: SCORE: 1981.13

## 2020-07-20 NOTE — PROGRESS NOTES
Sepsis Evaluation Progress Note    I was called to see Cash Cunningham due to abnormal vital signs triggering the Sepsis SIRS screening alert. He is not known to have an infection.     Physical Exam   Vital Signs:  Temp: 102.4  F (39.1  C)(RN notified) Temp src: Oral BP: 126/74 Pulse: 104 Heart Rate: 101 Resp: 14 SpO2: 93 % O2 Device: None (Room air) Oxygen Delivery: 2 LPM    Lab:  Lactic Acid   Date Value Ref Range Status   02/11/2020 1.4 0.7 - 2.0 mmol/L Final     Lactate for Sepsis Protocol   Date Value Ref Range Status   07/20/2020 0.7 0.7 - 2.0 mmol/L Final       The patient is at baseline mental status.     The rest of their physical exam is significant for: see progress note from earlier today    Assessment & Plan   Cash Cunningham meets SIRS criteria but does NOT have a lactate >2 or other evidence of acute organ damage.  These vital sign, lab and physical exam findings are consistent with SEPSIS.    WBC this AM 5.6, was up to 16.7 on 7/19. Pt did receive Cefoxitin X1 dose pre-op on 7/19. Denies SOB, rashes, open sores, urinary sx. Incision sites appear C/D/I.   -- Procal added on to most recent blood draw   -- UA, CXR   -- BC obtained this AM, NGTD    Sepsis Time-Zero (time Sepsis diagnosis confirmed): 1700  07/20/20    Anti-infectives (From now, onward)    Start     Dose/Rate Route Frequency Ordered Stop    07/20/20 1715  piperacillin-tazobactam (ZOSYN) 3.375 g vial to attach to  mL bag      3.375 g  over 30 Minutes Intravenous EVERY 6 HOURS 07/20/20 1700          Current antibiotic coverage is appropriate for source of infection, empiric antibiotic coverage for possible intraabdominal infection.     Disposition: The patient will remain on the current unit. We will continue to monitor this patient closely.     Tania Adorno PA-C    Sepsis Criteria   Sepsis: 2+ SIRS criteria due to infection  Severe Sepsis: Sepsis AND 1+ new sign of acute organ dysfunction (Note: lactate >2 is organ  dysfunction)  Septic Shock: Sepsis AND hypotension despite volume resuscitation with 30 ml/kg crystalloid

## 2020-07-20 NOTE — PROGRESS NOTES
[facility]    Medicine Progress Note - Hospitalist Service       Date of Admission:  7/18/2020    Assessment & Plan   Cash Cunningham is a 39 year old male with past medical history significant for Meckel's diverticulum s/p resection and re-anastomosis (2010, [facility]).  Has subsequently had recurrent SBO, all amenable to conservative treatment. Developed typical abdominal pain related to SBOs and presented to the ED on 7/18/2020. Registered under inpatient status for further evaluation and treatment. Ultimately, pt underwent ex lap on 7/19/2020 with lysis of adhesions and laparoscopic small bowel resection.     Recurrent SBO s/p ex lap with lysis of adhesions and small bowel resection (7/19/2020)  Hx of Meckel's diverticulum s/p resection and re-anastomosis (2010): Since initial surgery in 2010, pt has had recurrent small bowel obstructions, ~2 per year until 2016. Seemed to do well for a few years, but developed recurrent SBO in 11/2019 and 2/2020. Presented with abdominal pain typical of prior SBOs, typically does not get N/V or require NG tube. CT A/P with SBO with transition point at the small bowel anastomosis in the mid/lower abdomen and mild mesenteric edema and free fluid without discrete, drainable fluid collections or intraperitoneal air. Pt underwent above ex lap with Dr. Goodrich on 7/19.   - General Surgery following; plan for NG clamping trial  - IVF with D51/2NS at 100 ccs/hr   - Anagelsic regimen with PRN tylenol, oxycodone 5-10 mg q3 hrs PRN, IV Toradol 30 mg q6 hrs PRN, IV Dilaudid PCA  - Antiemetics available   - NPO for now, ice chips and sips OK   - Ambulate as much as possible     Fever: Tmax 101.5 degrees fahrenheit at 0547 on 7/20. Tachycardic in the 110s with fever. WBC this AM 5.6, was up to 16.7 on 7/19. Pt did receive Cefoxitin pre-op on 7/19. Denies SOB, rashes, open sores, urinary sx. Incision sites appear C/D/I.   - Most recent vitals with Tmax 100.1 degrees  fahrenheit, remainder WNL.   - Hold on antibiotics at this time   - Monitor fever curve   - Blood cultures ordered and pending     Hypokalemia: Potassium 3.1.   - Electrolyte replacement protocol      Restless leg syndrome: Resume Requip 1 mg at bedtime once diet advanced.      Anxiety  Depression: Resume Elavil 25 mg po at bedtime once diet advanced.     GI prophylaxis: Protonix 40 mg IV every day     Covid status: Negative per PCR on 7/18.      Diet: NPO for Medical/Clinical Reasons Except for: Meds    DVT Prophylaxis: Pneumatic Compression Devices and Ambulate every shift  Salinas Catheter: not present  Code Status: Full Code         Disposition Plan   Expected discharge: 2 - 3 days, recommended to prior living arrangement once return of bowel function, pain controlled, fever resolved.  Entered: Tania Adorno PA-C 07/20/2020, 11:26 AM     The patient's care was discussed with the Attending Physician, Dr. Tejeda, Bedside Nurse and Patient.    Tania Adorno PA-C  Hospitalist Service  [facility]  ______________________________________________________________________    Interval History   Tmax 101.5 degrees fahrenheit this AM. No infectious sx reported. Low potassium, replacement protocol in place. Seen by surgery this AM, NG clamping trial in place. Feels itchy, reports liquid benadryl seems to work best. Not passing flatus.     Data reviewed today: I reviewed all medications, new labs and imaging results over the last 24 hours. I personally reviewed all labs and imaging to date.     Physical Exam   Vital Signs: Temp: 100  F (37.8  C) Temp src: Oral BP: 112/71 Pulse: 123 Heart Rate: 93 Resp: 14 SpO2: 92 % O2 Device: None (Room air) Oxygen Delivery: 2 LPM  Weight: 227 lbs 15.29 oz    CONSTITUTIONAL: Pt laying in bed, dressed in hospital garb. Appears comfortable. Cooperative with interview.   HEENT: Normocephalic, atraumatic. NG tube in place, currently clamped.    CARDIOVASCULAR: RRR, no murmurs, rubs, or extra heart sounds appreciated. Pulses +2/4 and regular in upper and lower extremities, bilaterally.   RESPIRATORY: No increased work of breathing.  CTA, bilat; no wheezes, rales, or rhonchi appreciated.  GASTROINTESTINAL:  Abdomen soft, non-distended. BS hypoactive. Negative for tenderness to palpation.  No masses or organomegaly noted.  MUSCULOSKELETAL: No gross deformities noted. Normal muscle tone.   HEMATOLOGIC/LYMPHATIC/IMMUNOLOGIC: Negative for lower extremity edema, bilaterally.  NEUROLOGIC: Alert and oriented to person, place, and time.  strength intact.   SKIN: Lap incision sites C/D/I.     Data   Recent Labs   Lab 07/20/20  0638 07/19/20  1420 07/18/20  1312   WBC 5.6 16.7* 8.2   HGB 13.8 15.4 15.7   MCV 86 84 84    204 212    138 140   POTASSIUM 3.1* 3.7 3.7   CHLORIDE 105 107 108   CO2 26 26 26   BUN 13 10 11   CR 1.01 0.96 1.03   ANIONGAP 6 5 6   ANGELLA 7.8* 8.5 9.3   * 150* 119*   ALBUMIN  --   --  4.2   PROTTOTAL  --   --  7.3   BILITOTAL  --   --  0.5   ALKPHOS  --   --  68   ALT  --   --  52   AST  --   --  22   LIPASE  --   --  163     No results found for this or any previous visit (from the past 24 hour(s)).  Medications     dextrose 5% and 0.45% NaCl 100 mL/hr at 07/20/20 0800     HYDROmorphone         pantoprazole (PROTONIX) IV  40 mg Intravenous Daily with breakfast     sodium chloride (PF)  3 mL Intracatheter Q8H

## 2020-07-20 NOTE — PROVIDER NOTIFICATION
MD Notification    Notified Person: MD     Notified Person Name: Joleen    Notification Date/Time: 2145 7/19    Notification Interaction: Call    Purpose of Notification: Uncontrolled pain     Orders Received: Pain Pump and Toradol    Comments:

## 2020-07-20 NOTE — PROVIDER NOTIFICATION
MD Notification    Notified Person: MD    Notified Person Name: Katlyn    Notification Date/Time: 07/20 0600    Notification Interaction: Amcom    Purpose of Notification: FYI for fever of 101.5    Orders Received: Rectal tylenol if fever continues, encourage IS, and blood cultures.     Comments:

## 2020-07-20 NOTE — PLAN OF CARE
A/Ox4. VSS ex htn/tachy. LS: clear. BS: hypo, flatus+. Abd incision CDI, scant drainage, abdominal binder in place. 3 Lap sites with bandaids, CDI. Pain controlled with PCA pump with dilaudid and PRN Toradol. IVF running @ 100. NG  6 hour clamping trial started successful; NG tube pulled @ 1730.  Advanced to clear liquids.  Salinas pulled; voiding adequately. SBA; up ambulating in hallway. K+ replaced; recheck 3.3. Benadryl given for itching. Will continue to monitor.

## 2020-07-20 NOTE — PLAN OF CARE
A/Ox4. VSS ex htn/tachfever - md aware. LS: clear. BS: hypo, flatus -. Abd incision CDI, scant drainage, ice applied. 3 Lap sites with bandaids, CDI. Pain controlled with PCA pump with dilaudid and PRN Toradol. IVF running @ 100. NG @ 65 cm, landmark unchanged - dark brown output. NPO ex meds. IVF running. Salinas with AUO. Intermittent nausea, compazine given x1. Patient requesting that day nurse calls father for update. Will continue to monitor.

## 2020-07-20 NOTE — PROGRESS NOTES
"GENERAL SURGERY PROGRESS NOTE    Subjective:  Doing fine this morning. Pain is controlled, the NG is what bothers him the most. Not passing gas yet, no nausea. NG output 1100 mL since surgery, slowing down. Has been up walking the johnson recently.    Objective:    Intake/Output Summary (Last 24 hours) at 7/20/2020 0947  Last data filed at 7/20/2020 0800  Gross per 24 hour   Intake 3566.67 ml   Output 2010 ml   Net 1556.67 ml     Labs:  ROUTINE IP LABS (Last four results)  BMP  Recent Labs   Lab 07/20/20  0638 07/19/20  1420 07/18/20  1312    138 140   POTASSIUM 3.1* 3.7 3.7   CHLORIDE 105 107 108   ANGELLA 7.8* 8.5 9.3   CO2 26 26 26   BUN 13 10 11   CR 1.01 0.96 1.03   * 150* 119*     CBC  Recent Labs   Lab 07/20/20  0638 07/19/20  1420 07/18/20  1312   WBC 5.6 16.7* 8.2   RBC 4.78 5.25 5.45   HGB 13.8 15.4 15.7   HCT 40.9 44.3 45.6   MCV 86 84 84   MCH 28.9 29.3 28.8   MCHC 33.7 34.8 34.4   RDW 13.9 13.7 13.6    204 212     PHYSICAL EXAM:  /71 (BP Location: Left arm)   Pulse 123   Temp 100  F (37.8  C) (Oral)   Resp 14   Ht 1.803 m (5' 11\")   Wt 103.4 kg (227 lb 15.3 oz)   SpO2 92%   BMI 31.79 kg/m    General: The patient is alert and oriented. Appropriate. No acute distress  Psych: pleasant affect, answers questions appropriately  Skin: Color appropriate for race, warm, dry.  Respiratory: The patient does not require supplemental oxygen. Breathing unlabored  GI:  Abdomen soft, minimal tenderness near incision sites. Non-distended. Dressings c/d/i. NG with dark green output, 1100 in cannister.    ASSESSMENT:  POD#1 small bowel resection with anastomosis. Doing well post-op, NG with moderate output, will initiate clamping trial today.     PLAN:  - remove carrasco  - NG clamping trial, if no nausea or vomiting after 6 hrs, OK to remove NG and start clears  - Encourage out of bed activity  - PCA for pain  - NPO for now, ice chips and sips OK    Benigno Ambrose MD  General Surgery Resident, " PGY-4  [phone]

## 2020-07-21 LAB
ANION GAP SERPL CALCULATED.3IONS-SCNC: 5 MMOL/L (ref 3–14)
BASOPHILS # BLD AUTO: 0 10E9/L (ref 0–0.2)
BASOPHILS NFR BLD AUTO: 0.3 %
BUN SERPL-MCNC: 11 MG/DL (ref 7–30)
CALCIUM SERPL-MCNC: 8.4 MG/DL (ref 8.5–10.1)
CHLORIDE SERPL-SCNC: 106 MMOL/L (ref 94–109)
CO2 SERPL-SCNC: 25 MMOL/L (ref 20–32)
COPATH REPORT: NORMAL
CREAT SERPL-MCNC: 0.94 MG/DL (ref 0.66–1.25)
DIFFERENTIAL METHOD BLD: ABNORMAL
EOSINOPHIL # BLD AUTO: 0.1 10E9/L (ref 0–0.7)
EOSINOPHIL NFR BLD AUTO: 1.1 %
ERYTHROCYTE [DISTWIDTH] IN BLOOD BY AUTOMATED COUNT: 13.8 % (ref 10–15)
GFR SERPL CREATININE-BSD FRML MDRD: >90 ML/MIN/{1.73_M2}
GLUCOSE SERPL-MCNC: 118 MG/DL (ref 70–99)
HCT VFR BLD AUTO: 37.5 % (ref 40–53)
HGB BLD-MCNC: 12.6 G/DL (ref 13.3–17.7)
IMM GRANULOCYTES # BLD: 0 10E9/L (ref 0–0.4)
IMM GRANULOCYTES NFR BLD: 0.2 %
LYMPHOCYTES # BLD AUTO: 1.3 10E9/L (ref 0.8–5.3)
LYMPHOCYTES NFR BLD AUTO: 20.9 %
MAGNESIUM SERPL-MCNC: 1.9 MG/DL (ref 1.6–2.3)
MCH RBC QN AUTO: 28.6 PG (ref 26.5–33)
MCHC RBC AUTO-ENTMCNC: 33.6 G/DL (ref 31.5–36.5)
MCV RBC AUTO: 85 FL (ref 78–100)
MONOCYTES # BLD AUTO: 0.8 10E9/L (ref 0–1.3)
MONOCYTES NFR BLD AUTO: 13.7 %
NEUTROPHILS # BLD AUTO: 3.9 10E9/L (ref 1.6–8.3)
NEUTROPHILS NFR BLD AUTO: 63.8 %
NRBC # BLD AUTO: 0 10*3/UL
NRBC BLD AUTO-RTO: 0 /100
PHOSPHATE SERPL-MCNC: 2.3 MG/DL (ref 2.5–4.5)
PLATELET # BLD AUTO: 180 10E9/L (ref 150–450)
POTASSIUM SERPL-SCNC: 3.3 MMOL/L (ref 3.4–5.3)
POTASSIUM SERPL-SCNC: 3.3 MMOL/L (ref 3.4–5.3)
RBC # BLD AUTO: 4.41 10E12/L (ref 4.4–5.9)
SODIUM SERPL-SCNC: 136 MMOL/L (ref 133–144)
WBC # BLD AUTO: 6.1 10E9/L (ref 4–11)

## 2020-07-21 PROCEDURE — 12000000 ZZH R&B MED SURG/OB

## 2020-07-21 PROCEDURE — 40000141 ZZH STATISTIC PERIPHERAL IV START W/O US GUIDANCE

## 2020-07-21 PROCEDURE — 25000132 ZZH RX MED GY IP 250 OP 250 PS 637: Performed by: INTERNAL MEDICINE

## 2020-07-21 PROCEDURE — 36415 COLL VENOUS BLD VENIPUNCTURE: CPT | Performed by: SURGERY

## 2020-07-21 PROCEDURE — 85025 COMPLETE CBC W/AUTO DIFF WBC: CPT | Performed by: PHYSICIAN ASSISTANT

## 2020-07-21 PROCEDURE — 83735 ASSAY OF MAGNESIUM: CPT | Performed by: PHYSICIAN ASSISTANT

## 2020-07-21 PROCEDURE — 25800030 ZZH RX IP 258 OP 636: Performed by: PHYSICIAN ASSISTANT

## 2020-07-21 PROCEDURE — 36415 COLL VENOUS BLD VENIPUNCTURE: CPT | Performed by: PHYSICIAN ASSISTANT

## 2020-07-21 PROCEDURE — 99232 SBSQ HOSP IP/OBS MODERATE 35: CPT | Performed by: PHYSICIAN ASSISTANT

## 2020-07-21 PROCEDURE — 25000128 H RX IP 250 OP 636: Performed by: PHYSICIAN ASSISTANT

## 2020-07-21 PROCEDURE — C9113 INJ PANTOPRAZOLE SODIUM, VIA: HCPCS | Performed by: INTERNAL MEDICINE

## 2020-07-21 PROCEDURE — 80048 BASIC METABOLIC PNL TOTAL CA: CPT | Performed by: PHYSICIAN ASSISTANT

## 2020-07-21 PROCEDURE — 25000128 H RX IP 250 OP 636: Performed by: SURGERY

## 2020-07-21 PROCEDURE — 25800025 ZZH RX 258: Performed by: INTERNAL MEDICINE

## 2020-07-21 PROCEDURE — 25000128 H RX IP 250 OP 636: Performed by: INTERNAL MEDICINE

## 2020-07-21 PROCEDURE — 25000132 ZZH RX MED GY IP 250 OP 250 PS 637: Performed by: PHYSICIAN ASSISTANT

## 2020-07-21 PROCEDURE — 84100 ASSAY OF PHOSPHORUS: CPT | Performed by: PHYSICIAN ASSISTANT

## 2020-07-21 PROCEDURE — 25000125 ZZHC RX 250: Performed by: PHYSICIAN ASSISTANT

## 2020-07-21 PROCEDURE — 84132 ASSAY OF SERUM POTASSIUM: CPT | Performed by: SURGERY

## 2020-07-21 RX ORDER — HYDROMORPHONE HYDROCHLORIDE 1 MG/ML
.3-.5 INJECTION, SOLUTION INTRAMUSCULAR; INTRAVENOUS; SUBCUTANEOUS
Status: DISCONTINUED | OUTPATIENT
Start: 2020-07-21 | End: 2020-07-22

## 2020-07-21 RX ORDER — KETOROLAC TROMETHAMINE 30 MG/ML
30 INJECTION, SOLUTION INTRAMUSCULAR; INTRAVENOUS ONCE
Status: DISCONTINUED | OUTPATIENT
Start: 2020-07-21 | End: 2020-07-22

## 2020-07-21 RX ADMIN — DIPHENHYDRAMINE HYDROCHLORIDE 25 MG: 25 SOLUTION ORAL at 04:55

## 2020-07-21 RX ADMIN — OXYCODONE HYDROCHLORIDE 10 MG: 5 TABLET ORAL at 23:20

## 2020-07-21 RX ADMIN — PIPERACILLIN SODIUM AND TAZOBACTAM SODIUM 3.38 G: 3; .375 INJECTION, POWDER, LYOPHILIZED, FOR SOLUTION INTRAVENOUS at 04:55

## 2020-07-21 RX ADMIN — POTASSIUM PHOSPHATE, MONOBASIC AND POTASSIUM PHOSPHATE, DIBASIC 15 MMOL: 224; 236 INJECTION, SOLUTION, CONCENTRATE INTRAVENOUS at 15:18

## 2020-07-21 RX ADMIN — POTASSIUM CHLORIDE 20 MEQ: 1500 TABLET, EXTENDED RELEASE ORAL at 12:46

## 2020-07-21 RX ADMIN — POTASSIUM CHLORIDE 40 MEQ: 1500 TABLET, EXTENDED RELEASE ORAL at 10:09

## 2020-07-21 RX ADMIN — OXYCODONE HYDROCHLORIDE 10 MG: 5 TABLET ORAL at 20:02

## 2020-07-21 RX ADMIN — ONDANSETRON 4 MG: 4 TABLET, ORALLY DISINTEGRATING ORAL at 23:20

## 2020-07-21 RX ADMIN — OXYCODONE HYDROCHLORIDE 10 MG: 5 TABLET ORAL at 16:38

## 2020-07-21 RX ADMIN — ACETAMINOPHEN 650 MG: 325 TABLET, FILM COATED ORAL at 11:00

## 2020-07-21 RX ADMIN — CALCIUM CARBONATE (ANTACID) CHEW TAB 500 MG 1000 MG: 500 CHEW TAB at 19:52

## 2020-07-21 RX ADMIN — ACETAMINOPHEN 650 MG: 325 TABLET, FILM COATED ORAL at 00:09

## 2020-07-21 RX ADMIN — DEXTROSE AND SODIUM CHLORIDE: 5; 450 INJECTION, SOLUTION INTRAVENOUS at 08:25

## 2020-07-21 RX ADMIN — PIPERACILLIN SODIUM AND TAZOBACTAM SODIUM 3.38 G: 3; .375 INJECTION, POWDER, LYOPHILIZED, FOR SOLUTION INTRAVENOUS at 16:53

## 2020-07-21 RX ADMIN — KETOROLAC TROMETHAMINE 30 MG: 30 INJECTION, SOLUTION INTRAMUSCULAR at 13:10

## 2020-07-21 RX ADMIN — ACETAMINOPHEN 650 MG: 325 TABLET, FILM COATED ORAL at 04:50

## 2020-07-21 RX ADMIN — DEXTROSE AND SODIUM CHLORIDE: 5; 450 INJECTION, SOLUTION INTRAVENOUS at 23:12

## 2020-07-21 RX ADMIN — KETOROLAC TROMETHAMINE 30 MG: 30 INJECTION, SOLUTION INTRAMUSCULAR at 07:25

## 2020-07-21 RX ADMIN — KETOROLAC TROMETHAMINE 30 MG: 30 INJECTION, SOLUTION INTRAMUSCULAR at 19:52

## 2020-07-21 RX ADMIN — PIPERACILLIN SODIUM AND TAZOBACTAM SODIUM 3.38 G: 3; .375 INJECTION, POWDER, LYOPHILIZED, FOR SOLUTION INTRAVENOUS at 23:10

## 2020-07-21 RX ADMIN — OXYCODONE HYDROCHLORIDE 10 MG: 5 TABLET ORAL at 10:09

## 2020-07-21 RX ADMIN — Medication: at 05:18

## 2020-07-21 RX ADMIN — PANTOPRAZOLE SODIUM 40 MG: 40 INJECTION, POWDER, FOR SOLUTION INTRAVENOUS at 11:45

## 2020-07-21 RX ADMIN — OXYCODONE HYDROCHLORIDE 10 MG: 5 TABLET ORAL at 13:10

## 2020-07-21 RX ADMIN — PIPERACILLIN SODIUM AND TAZOBACTAM SODIUM 3.38 G: 3; .375 INJECTION, POWDER, LYOPHILIZED, FOR SOLUTION INTRAVENOUS at 12:07

## 2020-07-21 ASSESSMENT — ACTIVITIES OF DAILY LIVING (ADL)
ADLS_ACUITY_SCORE: 14
ADLS_ACUITY_SCORE: 12
ADLS_ACUITY_SCORE: 14
ADLS_ACUITY_SCORE: 12

## 2020-07-21 ASSESSMENT — MIFFLIN-ST. JEOR: SCORE: 1982.13

## 2020-07-21 NOTE — PROGRESS NOTES
Surgery    Still having fevers but feels much better today. No nausea. Pain well controlled. Ambulating. No tachycardia. Only mild incisional pain.     Abd- Soft. Mild tenderness throughout. No rebound or guarding. No erythema    A/P  Still with fevers of unknown origin but continues to improve. Labs and CXR without source but did showed some atelectasis. . Abd soft and benign. Encourage aggressive ambulation and IS. Can slowly start diet if he feels hungry. Advised patient to limit PCA and convert to PRN when able.     Sher Goodrich M.D.  [facility]  [phone]

## 2020-07-21 NOTE — PLAN OF CARE
Patient is alert and orientated X 4, up with stand by assist, vital signs stable, denies nausea and vomiting  pain managed with PCA Dilaudid, Toradol and tylenol.  Dressing with dry drainage. Bowel sounds active, not passing flatus, no BM.

## 2020-07-21 NOTE — PROGRESS NOTES
[facility]    Medicine Progress Note - Hospitalist Service       Date of Admission:  7/18/2020    Assessment & Plan   Cash Cunningham is a 39 year old male with past medical history significant for Meckel's diverticulum s/p resection and re-anastomosis (2010, [facility]).  Has subsequently had recurrent SBO, all amenable to conservative treatment. Developed typical abdominal pain related to SBOs and presented to the ED on 7/18/2020. Registered under inpatient status for further evaluation and treatment. Ultimately, pt underwent ex lap on 7/19/2020 with lysis of adhesions and laparoscopic small bowel resection.     Recurrent SBO s/p ex lap with lysis of adhesions and small bowel resection (7/19/2020)  Hx of Meckel's diverticulum s/p resection and re-anastomosis (2010): Since initial surgery in 2010, pt has had recurrent small bowel obstructions, ~2 per year until 2016. Seemed to do well for a few years, but developed recurrent SBO in 11/2019 and 2/2020. Presented with abdominal pain typical of prior SBOs, typically does not get N/V or require NG tube. CT A/P with SBO with transition point at the small bowel anastomosis in the mid/lower abdomen and mild mesenteric edema and free fluid without discrete, drainable fluid collections or intraperitoneal air. Pt underwent above ex lap with Dr. Goodrich on 7/19.   - General Surgery following; plan for NG clamping trial  - IVF with D51/2NS at 100 ccs/hr   - Anagelsic regimen with PRN tylenol, oxycodone 5-10 mg q3 hrs PRN, IV Toradol 30 mg q6 hrs PRN, IV Dilaudid 0.3-0.5 mg q1 hr PRN for severe, breakthrough pain. Goal to stick with oral agents   - Antiemetics available   - NPO for now, ice chips and sips OK   - Ambulate as much as possible     Fever: Tmax 101.5 degrees fahrenheit at 0547 on 7/20. Tachycardic in the 110s with fever. WBC this AM 5.6, was up to 16.7 on 7/19. Pt did receive Cefoxitin pre-op on 7/19. Denies SOB, rashes, open sores, urinary sx.  Incision sites appear C/D/I. Lactic 0.7, procal 0.24 (low risk for systemic bacterial infection). UA unremarkable. CXR with atelectasis, otherwise unremarkable.   - Tmax 102.4 degrees fahrenheit in the past 24 hours, WBC WNL   - Started on empiric IV Zosyn 7/20 evening  - Monitor fever curve   - Follow blood cultures, NGTD at present     Hypokalemia  Hypophosphatemia: Potassium 3.3 today, phosphorus 2.3. Mg 1.9.   - Electrolyte replacement protocol     Acute normocytic anemia: No active signs of bleeding.   - Monitor     Recent Labs   Lab 07/21/20  0802 07/20/20  0638 07/19/20  1420 07/18/20  1312   HGB 12.6* 13.8 15.4 15.7      Headache: Describes migraine headache. No vision changes. No focal neuro deficits. Has been present all morning. Did not sleep well overnight. Denies N/V  - Toradol X1 now, analgesic regimen as above     Restless leg syndrome: Resume Requip 1 mg at bedtime once diet advanced.      Anxiety  Depression: Resume Elavil 25 mg po at bedtime once diet advanced.     GI prophylaxis: Protonix 40 mg IV every day     Covid status: Negative per PCR on 7/18.      Diet: Clear Liquid Diet    DVT Prophylaxis: Pneumatic Compression Devices and Ambulate every shift  Salinas Catheter: not present  Code Status: Full Code         Disposition Plan   Expected discharge: 2 - 3 days, recommended to prior living arrangement once return of bowel function, pain controlled, fever resolved.  Entered: Tania Adorno PA-C 07/21/2020, 7:43 AM     The patient's care was discussed with the Attending Physician, Dr. Tejeda, Bedside Nurse and Patient.    Tania Adorno PA-C  Hospitalist Service  [facility]  ______________________________________________________________________    Interval History   Tmax 102.4 degrees fahrenheit over the past 24 hours. No infectious sx reported. Low potassium, replacement protocol in place. Seen by surgery this AM. NG and Salinas removed 7/20. Pt  passing flatus now, no BM yet. Off Dilaudid PCA with goal to utilize oral meds only (IV available for severe, breakthrough pain). Has migraine headache present since this AM. Typically does not get headaches, but attributes to poor sleep and minimal food intake.     Data reviewed today: I reviewed all medications, new labs and imaging results over the last 24 hours. I personally reviewed all labs and imaging to date.     Physical Exam   Vital Signs: Temp: 98.3  F (36.8  C) Temp src: Oral BP: 133/78 Pulse: 87 Heart Rate: 93 Resp: 16 SpO2: 97 % O2 Device: None (Room air)    Weight: 228 lbs 2.82 oz    CONSTITUTIONAL: Pt laying in bed, dressed in hospital garb. Appears comfortable. Cooperative with interview.   HEENT: Normocephalic, atraumatic.   CARDIOVASCULAR: RRR, no murmurs, rubs, or extra heart sounds appreciated. Pulses +2/4 and regular in upper and lower extremities, bilaterally.   RESPIRATORY: No increased work of breathing.  CTA, bilat; no wheezes, rales, or rhonchi appreciated.  GASTROINTESTINAL:  Abdomen soft, non-distended. BS hypoactive. Negative for tenderness to palpation.   MUSCULOSKELETAL: No gross deformities noted. Normal muscle tone.   HEMATOLOGIC/LYMPHATIC/IMMUNOLOGIC: Negative for lower extremity edema, bilaterally.  NEUROLOGIC: Alert and oriented to person, place, and time.  strength intact.   SKIN: Lap incision sites C/D/I.     Data   Recent Labs   Lab 07/20/20  1621 07/20/20  1408 07/20/20  0638 07/19/20  1420 07/18/20  1312   WBC  --   --  5.6 16.7* 8.2   HGB  --   --  13.8 15.4 15.7   MCV  --   --  86 84 84   PLT  --   --  184 204 212   NA  --   --  137 138 140   POTASSIUM 3.4 3.3* 3.1* 3.7 3.7   CHLORIDE  --   --  105 107 108   CO2  --   --  26 26 26   BUN  --   --  13 10 11   CR  --   --  1.01 0.96 1.03   ANIONGAP  --   --  6 5 6   ANGELLA  --   --  7.8* 8.5 9.3   GLC  --   --  133* 150* 119*   ALBUMIN  --   --   --   --  4.2   PROTTOTAL  --   --   --   --  7.3   BILITOTAL  --   --   --   --   0.5   ALKPHOS  --   --   --   --  68   ALT  --   --   --   --  52   AST  --   --   --   --  22   LIPASE  --   --   --   --  163     Recent Results (from the past 24 hour(s))   XR Chest 2 Views    Narrative    EXAM: XR CHEST 2 VW  LOCATION: [facility]  DATE/TIME: 7/20/2020 6:44 PM    INDICATION: ; fever, rule out pneumonia;  COMPARISON: None.      Impression    IMPRESSION: Mild bandlike density at the left base medially consistent with pulmonary abscess atelectasis. Lungs otherwise clear. No pleural effusion or pneumothorax.  Normal heart size.     Medications     dextrose 5% and 0.45% NaCl 100 mL/hr at 07/20/20 1926     HYDROmorphone         pantoprazole (PROTONIX) IV  40 mg Intravenous Daily with breakfast     piperacillin-tazobactam  3.375 g Intravenous Q6H     sodium chloride (PF)  3 mL Intracatheter Q8H

## 2020-07-21 NOTE — PLAN OF CARE
A/Ox4. VSS except temp of 100.0. LS: clear. BS+; Flatus+, BM+. Abd incision CDI, scant drainage, abdominal binder in place. 3 Lap sites with bandaids, CDI. Pain controlled with po oxy & toradol.  IVF running @ 100. Advanced to full liquids.  Voiding adequately. SBA; up ambulating in hallway. K+ & Phosphorous replaced; rechecks in for a.m. Will continue to monitor.

## 2020-07-22 LAB
ALBUMIN UR-MCNC: 30 MG/DL
ANION GAP SERPL CALCULATED.3IONS-SCNC: 5 MMOL/L (ref 3–14)
APPEARANCE UR: CLEAR
BILIRUB UR QL STRIP: NEGATIVE
BUN SERPL-MCNC: 9 MG/DL (ref 7–30)
CALCIUM SERPL-MCNC: 8.2 MG/DL (ref 8.5–10.1)
CHLORIDE SERPL-SCNC: 106 MMOL/L (ref 94–109)
CO2 SERPL-SCNC: 26 MMOL/L (ref 20–32)
COLOR UR AUTO: YELLOW
CREAT SERPL-MCNC: 0.93 MG/DL (ref 0.66–1.25)
GFR SERPL CREATININE-BSD FRML MDRD: >90 ML/MIN/{1.73_M2}
GLUCOSE SERPL-MCNC: 98 MG/DL (ref 70–99)
GLUCOSE UR STRIP-MCNC: NEGATIVE MG/DL
HGB BLD-MCNC: 11 G/DL (ref 13.3–17.7)
HGB UR QL STRIP: ABNORMAL
KETONES UR STRIP-MCNC: 5 MG/DL
LEUKOCYTE ESTERASE UR QL STRIP: NEGATIVE
MUCOUS THREADS #/AREA URNS LPF: PRESENT /LPF
NITRATE UR QL: NEGATIVE
PH UR STRIP: 6 PH (ref 5–7)
PHOSPHATE SERPL-MCNC: 3 MG/DL (ref 2.5–4.5)
POTASSIUM SERPL-SCNC: 3.1 MMOL/L (ref 3.4–5.3)
RBC #/AREA URNS AUTO: 7 /HPF (ref 0–2)
SODIUM SERPL-SCNC: 137 MMOL/L (ref 133–144)
SOURCE: ABNORMAL
SP GR UR STRIP: 1.02 (ref 1–1.03)
UROBILINOGEN UR STRIP-MCNC: NORMAL MG/DL (ref 0–2)
WBC #/AREA URNS AUTO: 1 /HPF (ref 0–5)

## 2020-07-22 PROCEDURE — 25000128 H RX IP 250 OP 636

## 2020-07-22 PROCEDURE — 25000128 H RX IP 250 OP 636: Performed by: PHYSICIAN ASSISTANT

## 2020-07-22 PROCEDURE — 25000132 ZZH RX MED GY IP 250 OP 250 PS 637: Performed by: INTERNAL MEDICINE

## 2020-07-22 PROCEDURE — 25000132 ZZH RX MED GY IP 250 OP 250 PS 637: Performed by: SURGERY

## 2020-07-22 PROCEDURE — 36415 COLL VENOUS BLD VENIPUNCTURE: CPT | Performed by: PHYSICIAN ASSISTANT

## 2020-07-22 PROCEDURE — 99232 SBSQ HOSP IP/OBS MODERATE 35: CPT | Performed by: PHYSICIAN ASSISTANT

## 2020-07-22 PROCEDURE — 81001 URINALYSIS AUTO W/SCOPE: CPT | Performed by: PHYSICIAN ASSISTANT

## 2020-07-22 PROCEDURE — 84100 ASSAY OF PHOSPHORUS: CPT | Performed by: PHYSICIAN ASSISTANT

## 2020-07-22 PROCEDURE — 80048 BASIC METABOLIC PNL TOTAL CA: CPT | Performed by: PHYSICIAN ASSISTANT

## 2020-07-22 PROCEDURE — 85018 HEMOGLOBIN: CPT | Performed by: PHYSICIAN ASSISTANT

## 2020-07-22 PROCEDURE — 12000000 ZZH R&B MED SURG/OB

## 2020-07-22 PROCEDURE — 25000132 ZZH RX MED GY IP 250 OP 250 PS 637: Performed by: PHYSICIAN ASSISTANT

## 2020-07-22 PROCEDURE — C9113 INJ PANTOPRAZOLE SODIUM, VIA: HCPCS | Performed by: INTERNAL MEDICINE

## 2020-07-22 PROCEDURE — 25000128 H RX IP 250 OP 636: Performed by: INTERNAL MEDICINE

## 2020-07-22 PROCEDURE — 25000128 H RX IP 250 OP 636: Performed by: SURGERY

## 2020-07-22 RX ORDER — HYDROMORPHONE HYDROCHLORIDE 1 MG/ML
.3-.5 INJECTION, SOLUTION INTRAMUSCULAR; INTRAVENOUS; SUBCUTANEOUS EVERY 4 HOURS PRN
Status: DISCONTINUED | OUTPATIENT
Start: 2020-07-22 | End: 2020-07-24 | Stop reason: HOSPADM

## 2020-07-22 RX ORDER — IBUPROFEN 600 MG/1
600 TABLET, FILM COATED ORAL EVERY 6 HOURS PRN
Status: DISCONTINUED | OUTPATIENT
Start: 2020-07-22 | End: 2020-07-24 | Stop reason: HOSPADM

## 2020-07-22 RX ORDER — ROPINIROLE 1 MG/1
1 TABLET, FILM COATED ORAL AT BEDTIME
Status: DISCONTINUED | OUTPATIENT
Start: 2020-07-22 | End: 2020-07-24 | Stop reason: HOSPADM

## 2020-07-22 RX ADMIN — AMITRIPTYLINE HYDROCHLORIDE 25 MG: 25 TABLET, FILM COATED ORAL at 21:45

## 2020-07-22 RX ADMIN — HYDROMORPHONE HYDROCHLORIDE 0.5 MG: 1 INJECTION, SOLUTION INTRAMUSCULAR; INTRAVENOUS; SUBCUTANEOUS at 08:43

## 2020-07-22 RX ADMIN — IBUPROFEN 600 MG: 600 TABLET ORAL at 15:30

## 2020-07-22 RX ADMIN — ACETAMINOPHEN 650 MG: 325 TABLET, FILM COATED ORAL at 12:51

## 2020-07-22 RX ADMIN — OXYCODONE HYDROCHLORIDE 10 MG: 5 TABLET ORAL at 13:21

## 2020-07-22 RX ADMIN — POTASSIUM CHLORIDE 20 MEQ: 1500 TABLET, EXTENDED RELEASE ORAL at 21:45

## 2020-07-22 RX ADMIN — OXYCODONE HYDROCHLORIDE 10 MG: 5 TABLET ORAL at 16:45

## 2020-07-22 RX ADMIN — OXYCODONE HYDROCHLORIDE 10 MG: 5 TABLET ORAL at 10:09

## 2020-07-22 RX ADMIN — PANTOPRAZOLE SODIUM 40 MG: 40 INJECTION, POWDER, FOR SOLUTION INTRAVENOUS at 08:33

## 2020-07-22 RX ADMIN — ONDANSETRON 4 MG: 4 TABLET, ORALLY DISINTEGRATING ORAL at 23:45

## 2020-07-22 RX ADMIN — POTASSIUM CHLORIDE 40 MEQ: 1500 TABLET, EXTENDED RELEASE ORAL at 19:34

## 2020-07-22 RX ADMIN — ROPINIROLE HYDROCHLORIDE 1 MG: 1 TABLET, FILM COATED ORAL at 21:45

## 2020-07-22 RX ADMIN — ONDANSETRON 4 MG: 4 TABLET, ORALLY DISINTEGRATING ORAL at 05:40

## 2020-07-22 RX ADMIN — ACETAMINOPHEN 650 MG: 325 TABLET, FILM COATED ORAL at 23:38

## 2020-07-22 RX ADMIN — OXYCODONE HYDROCHLORIDE 10 MG: 5 TABLET ORAL at 06:53

## 2020-07-22 RX ADMIN — KETOROLAC TROMETHAMINE 30 MG: 30 INJECTION, SOLUTION INTRAMUSCULAR at 03:40

## 2020-07-22 RX ADMIN — ONDANSETRON 4 MG: 2 INJECTION INTRAMUSCULAR; INTRAVENOUS at 12:45

## 2020-07-22 RX ADMIN — OXYCODONE HYDROCHLORIDE 10 MG: 5 TABLET ORAL at 03:40

## 2020-07-22 RX ADMIN — ACETAMINOPHEN 650 MG: 325 TABLET, FILM COATED ORAL at 18:07

## 2020-07-22 RX ADMIN — PIPERACILLIN SODIUM AND TAZOBACTAM SODIUM 3.38 G: 3; .375 INJECTION, POWDER, LYOPHILIZED, FOR SOLUTION INTRAVENOUS at 05:36

## 2020-07-22 RX ADMIN — OXYCODONE HYDROCHLORIDE 10 MG: 5 TABLET ORAL at 19:34

## 2020-07-22 RX ADMIN — OXYCODONE HYDROCHLORIDE 10 MG: 5 TABLET ORAL at 23:31

## 2020-07-22 ASSESSMENT — ACTIVITIES OF DAILY LIVING (ADL)
ADLS_ACUITY_SCORE: 12

## 2020-07-22 ASSESSMENT — MIFFLIN-ST. JEOR: SCORE: 1988.13

## 2020-07-22 NOTE — PLAN OF CARE
A/Ox4. AVSS. Up independently in the room. Advanced to regular diet this morning. Fair appetite. Complained of nausea x1 zofran given with improvement. Tylenol, ibuprofen and oxycodone given for pain. Abdominal incisions WDL. Passing gas, BS active, BM +. Voiding adequately. UA sent down to lab this evening. K: 3.1, replaced per protocol. Continue to monitor.

## 2020-07-22 NOTE — PROGRESS NOTES
[facility]    Medicine Progress Note - Hospitalist Service       Date of Admission:  7/18/2020    Assessment & Plan   Cash Cunningham is a 39 year old male with past medical history significant for Meckel's diverticulum s/p resection and re-anastomosis (2010, [facility]).  Has subsequently had recurrent SBO, all amenable to conservative treatment. Developed typical abdominal pain related to SBOs and presented to the ED on 7/18/2020. Registered under inpatient status for further evaluation and treatment. Ultimately, pt underwent ex lap on 7/19/2020 with lysis of adhesions and laparoscopic small bowel resection.     Recurrent SBO s/p ex lap with lysis of adhesions and small bowel resection (7/19/2020)  Hx of Meckel's diverticulum s/p resection and re-anastomosis (2010): Since initial surgery in 2010, pt has had recurrent small bowel obstructions, ~2 per year until 2016. Seemed to do well for a few years, but developed recurrent SBO in 11/2019 and 2/2020. Presented with abdominal pain typical of prior SBOs, typically does not get N/V or require NG tube. CT A/P with SBO with transition point at the small bowel anastomosis in the mid/lower abdomen and mild mesenteric edema and free fluid without discrete, drainable fluid collections or intraperitoneal air. Pt underwent above ex lap with Dr. Goodrich on 7/19.   - General Surgery following  - Anagelsic regimen with PRN tylenol, ibuprofen 600 mg q6 hrs PRN, oxycodone 5-10 mg q3 hrs PRN, IV Dilaudid 0.3-0.5 mg q4 hr PRN for severe, breakthrough pain. Goal to stick with oral agents.  - Antiemetics available   - Regular diet, IVF discontinued   - Ambulate as much as possible     Pneumaturia: Developed evening of 7/21. UA on both 7/20 and 7/22 unremarkable. Reports bladder injury following prior bowel resection in 2010 requiring surgery.   - Urology consulted, appreciate assistance     Post-op fever, resolved: Tmax 101.5 degrees fahrenheit at 0547 on 7/20.  Tachycardic in the 110s with fever. WBC this AM 5.6, was up to 16.7 on 7/19. Pt did receive Cefoxitin pre-op on 7/19. Denies SOB, rashes, open sores, urinary sx. Incision sites appear C/D/I. Lactic 0.7, procal 0.24 (low risk for systemic bacterial infection). UA unremarkable. CXR with atelectasis, otherwise unremarkable.   - Afebrile X24 hours, WBC WNL   - Started on empiric IV Zosyn 7/20 evening, discontinued on 7/22 due to lack of persistent fevers, BC with NGTD, WBC WNL  - Monitor fever curve     Hypokalemia  Hypophosphatemia, resolved: Potassium 3.1 today, phosphorus 3.0. Mg 1.9.   - Electrolyte replacement protocol     Acute normocytic anemia: No active signs of bleeding. Do note some mild bruising at lap incision site near umbilicus  - Monitor     Recent Labs   Lab 07/22/20  0859 07/21/20  0802 07/20/20  0638 07/19/20  1420 07/18/20  1312   HGB 11.0* 12.6* 13.8 15.4 15.7      Headache, resolved: Described migraine headache on 7/21. No vision changes. No focal neuro deficits. Attributed to sleep and inability to eat. No N/V. Since resolved.     Restless leg syndrome: Resume Requip 1 mg at bedtime now that diet has been advanced      Anxiety  Depression: Resume Elavil 25 mg po at bedtime now that diet has been advanced     GI prophylaxis: Protonix 40 mg IV every day     Covid status: Negative per PCR on 7/18.      Diet: Regular Diet Adult    DVT Prophylaxis: Pneumatic Compression Devices and Ambulate every shift  Salinas Catheter: not present  Code Status: Full Code         Disposition Plan   Expected discharge: 2 - 3 days, recommended to prior living arrangement once return of bowel function, pain controlled, fever resolved.  Entered: Tania Adorno PA-C 07/22/2020, 5:20 PM     The patient's care was discussed with the Attending Physician, Dr. Tejeda, Bedside Nurse and Patient.    Tania Adorno PA-C  Hospitalist Service  [facility]  Hospital  ______________________________________________________________________    Interval History   Afebrile overnight. Reports air/bubbling when he urinates, starting last evening.     Data reviewed today: I reviewed all medications, new labs and imaging results over the last 24 hours. I personally reviewed all labs and imaging to date.     Physical Exam   Vital Signs: Temp: 98.2  F (36.8  C) Temp src: Oral BP: 116/62 Pulse: 72 Heart Rate: 76 Resp: 18 SpO2: 96 % O2 Device: None (Room air)    Weight: 229 lbs 7.98 oz    CONSTITUTIONAL: Pt laying in bed, dressed in hospital garb. Appears comfortable. Cooperative with interview.   HEENT: Normocephalic, atraumatic.   CARDIOVASCULAR: RRR, no murmurs, rubs, or extra heart sounds appreciated. Pulses +2/4 and regular in upper and lower extremities, bilaterally.   RESPIRATORY: No increased work of breathing.  CTA, bilat; no wheezes, rales, or rhonchi appreciated.  GASTROINTESTINAL:  Abdomen soft, non-distended. BS active. Binder in place, reports pain when binder removed. Most of pain localized to incision site at umbilicus, mild surrounding bruising noted.   MUSCULOSKELETAL: No gross deformities noted. Normal muscle tone.   HEMATOLOGIC/LYMPHATIC/IMMUNOLOGIC: Negative for lower extremity edema, bilaterally.  NEUROLOGIC: Alert and oriented to person, place, and time.  strength intact.   SKIN: Note mild bruising at umbilicus, three other lap sites C/D/I.     Data   Recent Labs   Lab 07/22/20  0859 07/21/20  1739 07/21/20  0802  07/20/20  0638 07/19/20  1420 07/18/20  1312   WBC  --   --  6.1  --  5.6 16.7* 8.2   HGB 11.0*  --  12.6*  --  13.8 15.4 15.7   MCV  --   --  85  --  86 84 84   PLT  --   --  180  --  184 204 212     --  136  --  137 138 140   POTASSIUM 3.1* 3.3* 3.3*   < > 3.1* 3.7 3.7   CHLORIDE 106  --  106  --  105 107 108   CO2 26  --  25  --  26 26 26   BUN 9  --  11  --  13 10 11   CR 0.93  --  0.94  --  1.01 0.96 1.03   ANIONGAP 5  --  5  --  6 5 6    ANGELLA 8.2*  --  8.4*  --  7.8* 8.5 9.3   GLC 98  --  118*  --  133* 150* 119*   ALBUMIN  --   --   --   --   --   --  4.2   PROTTOTAL  --   --   --   --   --   --  7.3   BILITOTAL  --   --   --   --   --   --  0.5   ALKPHOS  --   --   --   --   --   --  68   ALT  --   --   --   --   --   --  52   AST  --   --   --   --   --   --  22   LIPASE  --   --   --   --   --   --  163    < > = values in this interval not displayed.     No results found for this or any previous visit (from the past 24 hour(s)).  Medications       pantoprazole (PROTONIX) IV  40 mg Intravenous Daily with breakfast     sodium chloride (PF)  3 mL Intracatheter Q8H

## 2020-07-22 NOTE — PROGRESS NOTES
"GENERAL SURGERY PROGRESS NOTE    Subjective:  Doing well today. Multiple loose bowel movements yesterday. Tolerated liquids fine, occasional stomach ache but denies nausea or vomiting. Pain is otherwise well controlled. He noticed some air during voiding, mid void yesterday around noon and it's persisted since then. He had this with his last surgery and they ended up finding a bladder injury that they had to repair and he's wondering if we need to look for something like that again.    Objective:  PHYSICAL EXAM:  /65 (BP Location: Right arm)   Pulse 76   Temp 99.1  F (37.3  C) (Oral)   Resp 18   Ht 1.803 m (5' 11\")   Wt 103.5 kg (228 lb 2.8 oz)   SpO2 97%   BMI 31.82 kg/m    General: The patient is alert and oriented. Appropriate. No acute distress  Psych: pleasant affect, answers questions appropriately  Skin: Color appropriate for race, warm, dry.  Respiratory: The patient does not require supplemental oxygen. Breathing unlabored  GI:  Abdomen soft, minimal tenderness near incision sites, all bandages removed, steristrips still intact. Non-distended.     ASSESSMENT:  POD#3 small bowel resection with anastomosis. Doing well post-op, tolerating liquids and stooling.    PLAN:  - Encourage out of bed activity  - OK for regular diet  - OK to discontinue IVF  - Would recommend discontinuing ABx given negative urine, NGTD on cultures, and normalized WBC and fevers.   - Continue monitoring voids for air, low suspicion for bladder injury and fistula formation this quickly, but if air persists consider replacing carrasco and XR cystogram.   - Spaced out IV dilaudid to q4h jeison Ambrose MD  General Surgery Resident, PGY-4  [phone]        "

## 2020-07-22 NOTE — PLAN OF CARE
A/O x4. VSS on RA. PRN Oxycodone and Toradol given for pain. Pt c/o migraines throughout the night. PRN Zofran given x2. 3 lap sites with bandaids, midline incision with steri strips, SACHIN. BS+, flatus+, BM yesterday evening. Abdomen distended. LS clear. Voiding. Pt stated concern for passing air through urethra when voiding and has had difficulty with this issue in the past with a previous surgery. Up independently in the room. Tolerating full liquid diet, minimal appetite. IVF infusing at 100 ml/hr. Will continue to monitor.

## 2020-07-23 LAB
ERYTHROCYTE [DISTWIDTH] IN BLOOD BY AUTOMATED COUNT: 13.9 % (ref 10–15)
HCT VFR BLD AUTO: 34.5 % (ref 40–53)
HGB BLD-MCNC: 11.5 G/DL (ref 13.3–17.7)
MAGNESIUM SERPL-MCNC: 1.9 MG/DL (ref 1.6–2.3)
MCH RBC QN AUTO: 28 PG (ref 26.5–33)
MCHC RBC AUTO-ENTMCNC: 33.3 G/DL (ref 31.5–36.5)
MCV RBC AUTO: 84 FL (ref 78–100)
PLATELET # BLD AUTO: 192 10E9/L (ref 150–450)
POTASSIUM SERPL-SCNC: 3.2 MMOL/L (ref 3.4–5.3)
POTASSIUM SERPL-SCNC: 3.3 MMOL/L (ref 3.4–5.3)
POTASSIUM SERPL-SCNC: 3.7 MMOL/L (ref 3.4–5.3)
RBC # BLD AUTO: 4.11 10E12/L (ref 4.4–5.9)
WBC # BLD AUTO: 6.5 10E9/L (ref 4–11)

## 2020-07-23 PROCEDURE — 84132 ASSAY OF SERUM POTASSIUM: CPT | Performed by: PHYSICIAN ASSISTANT

## 2020-07-23 PROCEDURE — 25000128 H RX IP 250 OP 636: Performed by: INTERNAL MEDICINE

## 2020-07-23 PROCEDURE — 12000000 ZZH R&B MED SURG/OB

## 2020-07-23 PROCEDURE — 36415 COLL VENOUS BLD VENIPUNCTURE: CPT | Performed by: SURGERY

## 2020-07-23 PROCEDURE — 84132 ASSAY OF SERUM POTASSIUM: CPT | Performed by: SURGERY

## 2020-07-23 PROCEDURE — 25000132 ZZH RX MED GY IP 250 OP 250 PS 637: Performed by: INTERNAL MEDICINE

## 2020-07-23 PROCEDURE — 85027 COMPLETE CBC AUTOMATED: CPT | Performed by: SURGERY

## 2020-07-23 PROCEDURE — 99232 SBSQ HOSP IP/OBS MODERATE 35: CPT | Performed by: PHYSICIAN ASSISTANT

## 2020-07-23 PROCEDURE — 25000132 ZZH RX MED GY IP 250 OP 250 PS 637: Performed by: PHYSICIAN ASSISTANT

## 2020-07-23 PROCEDURE — C9113 INJ PANTOPRAZOLE SODIUM, VIA: HCPCS | Performed by: INTERNAL MEDICINE

## 2020-07-23 PROCEDURE — 36415 COLL VENOUS BLD VENIPUNCTURE: CPT | Performed by: PHYSICIAN ASSISTANT

## 2020-07-23 PROCEDURE — 25000132 ZZH RX MED GY IP 250 OP 250 PS 637: Performed by: SURGERY

## 2020-07-23 PROCEDURE — 25000128 H RX IP 250 OP 636

## 2020-07-23 PROCEDURE — 83735 ASSAY OF MAGNESIUM: CPT | Performed by: SURGERY

## 2020-07-23 PROCEDURE — 99221 1ST HOSP IP/OBS SF/LOW 40: CPT | Performed by: UROLOGY

## 2020-07-23 RX ORDER — POTASSIUM CHLORIDE 1.5 G/1.58G
20-40 POWDER, FOR SOLUTION ORAL
Status: DISCONTINUED | OUTPATIENT
Start: 2020-07-23 | End: 2020-07-24 | Stop reason: HOSPADM

## 2020-07-23 RX ORDER — POTASSIUM CHLORIDE 7.45 MG/ML
10 INJECTION INTRAVENOUS
Status: DISCONTINUED | OUTPATIENT
Start: 2020-07-23 | End: 2020-07-24 | Stop reason: HOSPADM

## 2020-07-23 RX ORDER — POTASSIUM CL/LIDO/0.9 % NACL 10MEQ/0.1L
10 INTRAVENOUS SOLUTION, PIGGYBACK (ML) INTRAVENOUS
Status: DISCONTINUED | OUTPATIENT
Start: 2020-07-23 | End: 2020-07-24 | Stop reason: HOSPADM

## 2020-07-23 RX ORDER — LORAZEPAM 0.5 MG/1
0.5 TABLET ORAL EVERY 4 HOURS PRN
Status: DISCONTINUED | OUTPATIENT
Start: 2020-07-23 | End: 2020-07-24 | Stop reason: HOSPADM

## 2020-07-23 RX ORDER — POTASSIUM CHLORIDE 1500 MG/1
20-40 TABLET, EXTENDED RELEASE ORAL
Status: DISCONTINUED | OUTPATIENT
Start: 2020-07-23 | End: 2020-07-24 | Stop reason: HOSPADM

## 2020-07-23 RX ORDER — PANTOPRAZOLE SODIUM 40 MG/1
40 TABLET, DELAYED RELEASE ORAL
Status: DISCONTINUED | OUTPATIENT
Start: 2020-07-23 | End: 2020-07-24 | Stop reason: HOSPADM

## 2020-07-23 RX ORDER — POTASSIUM CHLORIDE 29.8 MG/ML
20 INJECTION INTRAVENOUS
Status: DISCONTINUED | OUTPATIENT
Start: 2020-07-23 | End: 2020-07-23 | Stop reason: CLARIF

## 2020-07-23 RX ADMIN — IBUPROFEN 600 MG: 600 TABLET ORAL at 08:23

## 2020-07-23 RX ADMIN — OXYCODONE HYDROCHLORIDE 10 MG: 5 TABLET ORAL at 17:49

## 2020-07-23 RX ADMIN — ACETAMINOPHEN 650 MG: 325 TABLET, FILM COATED ORAL at 03:37

## 2020-07-23 RX ADMIN — OXYCODONE HYDROCHLORIDE 10 MG: 5 TABLET ORAL at 09:44

## 2020-07-23 RX ADMIN — POTASSIUM CHLORIDE 20 MEQ: 1500 TABLET, EXTENDED RELEASE ORAL at 09:50

## 2020-07-23 RX ADMIN — LORAZEPAM 0.5 MG: 0.5 TABLET ORAL at 15:17

## 2020-07-23 RX ADMIN — OXYCODONE HYDROCHLORIDE 10 MG: 5 TABLET ORAL at 02:46

## 2020-07-23 RX ADMIN — ACETAMINOPHEN 650 MG: 325 TABLET, FILM COATED ORAL at 17:49

## 2020-07-23 RX ADMIN — POTASSIUM CHLORIDE 40 MEQ: 1500 TABLET, EXTENDED RELEASE ORAL at 03:35

## 2020-07-23 RX ADMIN — POTASSIUM CHLORIDE 20 MEQ: 1500 TABLET, EXTENDED RELEASE ORAL at 05:33

## 2020-07-23 RX ADMIN — ACETAMINOPHEN 650 MG: 325 TABLET, FILM COATED ORAL at 21:48

## 2020-07-23 RX ADMIN — OXYCODONE HYDROCHLORIDE 10 MG: 5 TABLET ORAL at 06:08

## 2020-07-23 RX ADMIN — AMITRIPTYLINE HYDROCHLORIDE 25 MG: 25 TABLET, FILM COATED ORAL at 21:48

## 2020-07-23 RX ADMIN — OXYCODONE HYDROCHLORIDE 10 MG: 5 TABLET ORAL at 21:48

## 2020-07-23 RX ADMIN — ROPINIROLE HYDROCHLORIDE 1 MG: 1 TABLET, FILM COATED ORAL at 21:48

## 2020-07-23 RX ADMIN — OXYCODONE HYDROCHLORIDE 10 MG: 5 TABLET ORAL at 12:57

## 2020-07-23 RX ADMIN — ACETAMINOPHEN 650 MG: 325 TABLET, FILM COATED ORAL at 12:57

## 2020-07-23 RX ADMIN — HYDROMORPHONE HYDROCHLORIDE 0.5 MG: 1 INJECTION, SOLUTION INTRAMUSCULAR; INTRAVENOUS; SUBCUTANEOUS at 05:12

## 2020-07-23 RX ADMIN — ONDANSETRON 4 MG: 4 TABLET, ORALLY DISINTEGRATING ORAL at 17:30

## 2020-07-23 RX ADMIN — LORAZEPAM 0.5 MG: 0.5 TABLET ORAL at 09:50

## 2020-07-23 RX ADMIN — ACETAMINOPHEN 650 MG: 325 TABLET, FILM COATED ORAL at 08:23

## 2020-07-23 RX ADMIN — PANTOPRAZOLE SODIUM 40 MG: 40 TABLET, DELAYED RELEASE ORAL at 09:45

## 2020-07-23 ASSESSMENT — ACTIVITIES OF DAILY LIVING (ADL)
ADLS_ACUITY_SCORE: 12

## 2020-07-23 ASSESSMENT — MIFFLIN-ST. JEOR: SCORE: 1981.13

## 2020-07-23 NOTE — PROGRESS NOTES
Surgery    Feeling better today.  Had episode of crampy pain last night requiring 1 dose of Dilaudid.  This has completely resolved.  Feels his anxiety is much worse today. Tolerating diet.  Continues to have flatus and liquid bowel movements.  No fevers or tachycardia.  Still complaining of pneumaturia on every episode of urination.    Abdomen- soft with mild tenderness around incision.  Slight redness but no obvious erythema.  No rebound or guarding.    A/P  Overall improving.  WBC normal and multiple UAs negative. Awaiting urology consultation for ongoing pneumaturia.  If no further work-up is necessary while in the hospital he could possibly be discharged later today versus tomorrow pending progress.  Encourage ambulation and IS.  Will add ativan for anxiety.    Shre Goodrich M.D.  [facility]  [phone]

## 2020-07-23 NOTE — CONSULTS
Consult Date:  07/23/2020      UROLOGY CONSULTATION      REASON FOR CONSULTATION:  Pneumaturia.      HISTORY OF PRESENT ILLNESS:  Cash Cunningham is a 39-year-old gentleman who recently presented with a bowel obstruction.  He had a small-bowel obstruction at a site of a previous bowel anastomosis.  He underwent a small bowel resection and revision of his anastomosis in the operating room on 07/2019.  He is recovering well from that procedure.  I was consulted because the patient has experienced pneumaturia.  He was catheterized for the procedure, but the catheter is now out.  He had some pneumaturia after the catheter was removed, which is not unexpected, but he says that it persists.  He says that every time he urinates, he has a small bit of sputtering and air in the urine that he can feel.  He did have a CT scan performed upon admission and at that time, there was no catheter in place and there was no air throughout the collecting system.  The patient reports no hematuria and no difficulty urinating.  No frequency or urgency.  No nocturia or weak urinary stream.  He had a urinalysis performed after his Salinas catheter was removed and it showed some hematuria, but no other abnormalities present.  He has no prior urologic history.  He has no history of diverticulitis.      PAST MEDICAL HISTORY:  Small bowel obstruction as above, asthma.      PAST SURGICAL HISTORY:  Prior small bowel resections.  No prior urologic history.      HOME MEDICATIONS:  Albuterol, Elavil, Astelin, Zaditor, magnesium, Requip, Topamax.      ALLERGIES:  MORPHINE AND TRAZODONE.      SOCIAL HISTORY:  He is a nonsmoker.      FAMILY HISTORY:  No family history of urologic malignancies.      REVIEW OF SYSTEMS:  He is recovering well from his small-bowel anastomosis and has had return of bowel function.  He has mild abdominal pain at this time.  He has no urinary symptoms or complaints.  No constitutional symptoms.  No fevers, chills, nausea or  vomiting.      PHYSICAL EXAMINATION:   VITAL SIGNS:  Currently, afebrile.  Vital signs stable.   GENERAL:  Alert and oriented, in no acute distress.   HEENT:  Normocephalic and atraumatic.   RESPIRATORY:  Normal nonlabored breathing.   ABDOMEN:  Soft, nontender, nondistended.      IMAGING STUDIES:  I reviewed his CT scan images and I do not see any air throughout the collecting system in the kidneys, ureters or bladder.  Urinary tract appears unremarkable.      IMPRESSION AND PLAN:  This is a 39-year-old gentleman who reports pneumaturia.  The patient has no history of diverticulitis and no air seen in the collecting system on the CT scan.  Urinalysis is also unremarkable in that it shows only some small hematuria after catheter removal.  Since the patient does perceive pneumaturia every time he voids, I did recommend that he undergo a cystoscopy.  I gave the patient my card and my office information.  I will have my office contact him and I will see him back in the clinic as an outpatient once he has recovered from his surgery so that we can perform a cystoscopy and rule out fistula or other underlying causes of potential pneumaturia.  The patient agreed to the plan.  He is okay to discharge home from Urology standpoint, and my office will contact him to schedule a followup.         BETH RODRIGUEZ MD             D: 2020   T: 2020   MT: STEVE      Name:     DC BURGOS   MRN:      [id number]        Account:       [id number]   :      1980           Consult Date:  2020      Document: [id number]       cc: [facility]

## 2020-07-23 NOTE — PLAN OF CARE
A/OX4. AVSS. Up independently. Regular diet appetite improving. Pain managed with oral oxy, tylenol and ibuprofen. Ativan started for anxiety PRN. Also making improvement on pain. Gas +, BM+, adequate urine output. K 3.2 replaced, recheck 3.7. CMS intact. Abdominal incisions WDL. Continue to monitor.

## 2020-07-23 NOTE — PROGRESS NOTES
[facility]    Medicine Progress Note - Hospitalist Service       Date of Admission:  7/18/2020    Assessment & Plan   Cash Cunningham is a 39 year old male with past medical history significant for Meckel's diverticulum s/p resection and re-anastomosis (2010, [facility]).  Has subsequently had recurrent SBO, all amenable to conservative treatment. Developed typical abdominal pain related to SBOs and presented to the ED on 7/18/2020. Registered under inpatient status for further evaluation and treatment. Ultimately, pt underwent ex lap on 7/19/2020 with lysis of adhesions and laparoscopic small bowel resection.     Recurrent SBO s/p ex lap with lysis of adhesions and small bowel resection (7/19/2020)  Hx of Meckel's diverticulum s/p resection and re-anastomosis (2010): Since initial surgery in 2010, pt has had recurrent small bowel obstructions, ~2 per year until 2016. Seemed to do well for a few years, but developed recurrent SBO in 11/2019 and 2/2020. Presented with abdominal pain typical of prior SBOs, typically does not get N/V or require NG tube. CT A/P with SBO with transition point at the small bowel anastomosis in the mid/lower abdomen and mild mesenteric edema and free fluid without discrete, drainable fluid collections or intraperitoneal air. Pt underwent above ex lap with Dr. Goodrich on 7/19.   - General Surgery following  - Anagelsic regimen with PRN tylenol, ibuprofen 600 mg q6 hrs PRN, oxycodone 5-10 mg q3 hrs PRN, IV Dilaudid 0.3-0.5 mg q4 hr PRN for severe, breakthrough pain. Goal to stick with oral agents.  - Antiemetics available   - Regular diet, IVF discontinued   - Ambulate as much as possible     Pneumaturia: Developed evening of 7/21. UA on both 7/20 and 7/22 unremarkable. Reports bladder injury following prior bowel resection in 2010 requiring surgery.   - Urology consulted, appreciate assistance. Recommend outpatient cystoscopy in 1 month. Refer to note by Dr. Diane.      Post-op fever, resolved: Tmax 101.5 degrees fahrenheit at 0547 on 7/20. Tachycardic in the 110s with fever. WBC this AM 5.6, was up to 16.7 on 7/19. Pt did receive Cefoxitin pre-op on 7/19. Denies SOB, rashes, open sores, urinary sx. Incision sites appear C/D/I. Lactic 0.7, procal 0.24 (low risk for systemic bacterial infection). UA unremarkable. CXR with atelectasis, otherwise unremarkable.   - Afebrile X24 hours, WBC WNL   - Started on empiric IV Zosyn 7/20 evening, discontinued on 7/22 due to lack of persistent fevers, BC with NGTD, WBC WNL  - Monitor fever curve     Hypokalemia  Hypophosphatemia, resolved: Potassium 3.3 today, phosphorus 3.0. Mg 1.9.   - Electrolyte replacement protocol in place     Acute normocytic anemia: No active signs of bleeding. Do note some mild bruising at lap incision site near umbilicus  - Monitor     Recent Labs   Lab 07/23/20  0823 07/22/20  0859 07/21/20  0802 07/20/20  0638 07/19/20  1420   HGB 11.5* 11.0* 12.6* 13.8 15.4      Headache, resolved: Described migraine headache on 7/21. No vision changes. No focal neuro deficits. Attributed to sleep and inability to eat. No N/V. Since resolved.     Restless leg syndrome: Resume Requip 1 mg at bedtime now that diet has been advanced      Anxiety  Depression: Resume Elavil 25 mg po at bedtime now that diet has been advanced. PRN oral ativan ordered by surgical team.     GI prophylaxis: Protonix 40 mg IV every day     Covid status: Negative per PCR on 7/18.      Diet: Regular Diet Adult    DVT Prophylaxis: Pneumatic Compression Devices and Ambulate every shift  [place] Catheter: not present  Code Status: Full Code         Disposition Plan   Expected discharge: Tomorrow, recommended to prior living arrangement once potassium corrected.     Entered: Tania Adorno PA-C 07/23/2020, 2:35 PM     The patient's care was discussed with Dr. Chamorro, bedside RN and pt.     CHARLES CantrellC  Hospitalist  Service  [facility]  ______________________________________________________________________    Interval History   Afebrile overnight. Had a bout of pain overnight requiring IV dilaudid, but pain has been controlled throughout the day on oral regimen. Eating well. Passing flatus with looser BMs. Still with pneumaturia for which he was seen by Urology. Potassium low, replacement in place, received 80 mEq today.     Data reviewed today: I reviewed all medications, new labs and imaging results over the last 24 hours. I personally reviewed all labs and imaging to date.     Physical Exam   Vital Signs: Temp: 98.6  F (37  C) Temp src: Oral BP: 119/72 Pulse: 72 Heart Rate: 81 Resp: 16 SpO2: 93 % O2 Device: None (Room air)    Weight: 227 lbs 15.29 oz    CONSTITUTIONAL: Pt laying in bed, dressed in hospital garb. Appears comfortable. Cooperative with interview.   HEENT: Normocephalic, atraumatic.   CARDIOVASCULAR: RRR, no murmurs, rubs, or extra heart sounds appreciated. Pulses +2/4 and regular in upper and lower extremities, bilaterally.   RESPIRATORY: No increased work of breathing.  CTA, bilat; no wheezes, rales, or rhonchi appreciated.  GASTROINTESTINAL:  Abdomen soft, non-distended. BS active. Binder in place, reports pain when binder removed. Most of pain localized to incision site at umbilicus, mild surrounding bruising noted.   MUSCULOSKELETAL: No gross deformities noted. Normal muscle tone.   HEMATOLOGIC/LYMPHATIC/IMMUNOLOGIC: Negative for lower extremity edema, bilaterally.  NEUROLOGIC: Alert and oriented to person, place, and time.  strength intact.   SKIN: Note mild bruising at umbilicus, three other lap sites C/D/I.     Data   Recent Labs   Lab 07/23/20  0823 07/23/20  0151 07/22/20  0859  07/21/20  0802  07/20/20  0638  07/18/20  1312   WBC 6.5  --   --   --  6.1  --  5.6   < > 8.2   HGB 11.5*  --  11.0*  --  12.6*  --  13.8   < > 15.7   MCV 84  --   --   --  85  --  86   < > 84      --   --   --  180  --  184   < > 212   NA  --   --  137  --  136  --  137   < > 140   POTASSIUM 3.3* 3.2* 3.1*   < > 3.3*   < > 3.1*   < > 3.7   CHLORIDE  --   --  106  --  106  --  105   < > 108   CO2  --   --  26  --  25  --  26   < > 26   BUN  --   --  9  --  11  --  13   < > 11   CR  --   --  0.93  --  0.94  --  1.01   < > 1.03   ANIONGAP  --   --  5  --  5  --  6   < > 6   ANGELLA  --   --  8.2*  --  8.4*  --  7.8*   < > 9.3   GLC  --   --  98  --  118*  --  133*   < > 119*   ALBUMIN  --   --   --   --   --   --   --   --  4.2   PROTTOTAL  --   --   --   --   --   --   --   --  7.3   BILITOTAL  --   --   --   --   --   --   --   --  0.5   ALKPHOS  --   --   --   --   --   --   --   --  68   ALT  --   --   --   --   --   --   --   --  52   AST  --   --   --   --   --   --   --   --  22   LIPASE  --   --   --   --   --   --   --   --  163    < > = values in this interval not displayed.     No results found for this or any previous visit (from the past 24 hour(s)).  Medications       amitriptyline  25 mg Oral At Bedtime     pantoprazole  40 mg Oral QAM AC     rOPINIRole  1 mg Oral At Bedtime     sodium chloride (PF)  3 mL Intracatheter Q8H

## 2020-07-23 NOTE — PLAN OF CARE
Patient is alert and orientated X 4, up independently, vital signs stable, C/O nausea and Zofran administered. Pain rated at 10/10 at 05:10 managed with Dilaudid, oxycodone and tylenol. Lap sites with bandaids, midline incision with steri strips, SACHIN. BS+, flatus+, BM Bowel sounds active, passing flatus, +BM.Potassium 3.1 replaced recheck 3.2 replacement completed for potassium recheck at 09:33. Will continue to monitor.

## 2020-07-24 VITALS
DIASTOLIC BLOOD PRESSURE: 59 MMHG | WEIGHT: 226.6 LBS | HEART RATE: 77 BPM | BODY MASS INDEX: 31.72 KG/M2 | TEMPERATURE: 99.4 F | HEIGHT: 71 IN | RESPIRATION RATE: 16 BRPM | SYSTOLIC BLOOD PRESSURE: 99 MMHG | OXYGEN SATURATION: 96 %

## 2020-07-24 LAB
HGB BLD-MCNC: 11.5 G/DL (ref 13.3–17.7)
POTASSIUM SERPL-SCNC: 3.5 MMOL/L (ref 3.4–5.3)

## 2020-07-24 PROCEDURE — 25000132 ZZH RX MED GY IP 250 OP 250 PS 637: Performed by: INTERNAL MEDICINE

## 2020-07-24 PROCEDURE — 85018 HEMOGLOBIN: CPT | Performed by: PHYSICIAN ASSISTANT

## 2020-07-24 PROCEDURE — 84132 ASSAY OF SERUM POTASSIUM: CPT | Performed by: PHYSICIAN ASSISTANT

## 2020-07-24 PROCEDURE — 36415 COLL VENOUS BLD VENIPUNCTURE: CPT | Performed by: PHYSICIAN ASSISTANT

## 2020-07-24 PROCEDURE — 25000132 ZZH RX MED GY IP 250 OP 250 PS 637: Performed by: PHYSICIAN ASSISTANT

## 2020-07-24 PROCEDURE — 25000132 ZZH RX MED GY IP 250 OP 250 PS 637: Performed by: SURGERY

## 2020-07-24 PROCEDURE — 99239 HOSP IP/OBS DSCHRG MGMT >30: CPT | Performed by: PHYSICIAN ASSISTANT

## 2020-07-24 RX ORDER — OXYCODONE HYDROCHLORIDE 5 MG/1
5-10 TABLET ORAL
Qty: 20 TABLET | Refills: 0 | Status: SHIPPED | OUTPATIENT
Start: 2020-07-24 | End: 2020-08-18

## 2020-07-24 RX ORDER — ACETAMINOPHEN 325 MG/1
650 TABLET ORAL EVERY 4 HOURS PRN
COMMUNITY
Start: 2020-07-24 | End: 2020-08-18

## 2020-07-24 RX ORDER — LORAZEPAM 0.5 MG/1
0.5 TABLET ORAL EVERY 8 HOURS PRN
Qty: 10 TABLET | Refills: 0 | Status: SHIPPED | OUTPATIENT
Start: 2020-07-24 | End: 2020-08-18

## 2020-07-24 RX ORDER — IBUPROFEN 600 MG/1
600 TABLET, FILM COATED ORAL EVERY 6 HOURS PRN
COMMUNITY
Start: 2020-07-24 | End: 2020-08-18

## 2020-07-24 RX ORDER — AMOXICILLIN 250 MG
1-2 CAPSULE ORAL 2 TIMES DAILY PRN
Qty: 30 TABLET | Refills: 0 | Status: SHIPPED | OUTPATIENT
Start: 2020-07-24 | End: 2020-08-18

## 2020-07-24 RX ADMIN — OXYCODONE HYDROCHLORIDE 10 MG: 5 TABLET ORAL at 07:58

## 2020-07-24 RX ADMIN — PANTOPRAZOLE SODIUM 40 MG: 40 TABLET, DELAYED RELEASE ORAL at 06:41

## 2020-07-24 RX ADMIN — LORAZEPAM 0.5 MG: 0.5 TABLET ORAL at 05:32

## 2020-07-24 RX ADMIN — OXYCODONE HYDROCHLORIDE 10 MG: 5 TABLET ORAL at 01:09

## 2020-07-24 RX ADMIN — ACETAMINOPHEN 650 MG: 325 TABLET, FILM COATED ORAL at 09:24

## 2020-07-24 RX ADMIN — ACETAMINOPHEN 650 MG: 325 TABLET, FILM COATED ORAL at 04:43

## 2020-07-24 RX ADMIN — IBUPROFEN 600 MG: 600 TABLET ORAL at 08:01

## 2020-07-24 RX ADMIN — OXYCODONE HYDROCHLORIDE 10 MG: 5 TABLET ORAL at 04:43

## 2020-07-24 RX ADMIN — IBUPROFEN 600 MG: 600 TABLET ORAL at 01:09

## 2020-07-24 RX ADMIN — OXYCODONE HYDROCHLORIDE 10 MG: 5 TABLET ORAL at 11:21

## 2020-07-24 ASSESSMENT — ACTIVITIES OF DAILY LIVING (ADL)
ADLS_ACUITY_SCORE: 12

## 2020-07-24 ASSESSMENT — MIFFLIN-ST. JEOR: SCORE: 1974.98

## 2020-07-24 NOTE — PLAN OF CARE
Pt alert and oriented. Vitally stable on room air. Pain controlled with PRN pain medications. Up independent. Passing gas. Voiding adequately. Clear lung sounds. Incisions with steri strips SACHIN. Tolerating diet. IV taken out. Medications filled/given to pt. Discharge teaching reviewed with patient. Pt home via private transportation.

## 2020-07-24 NOTE — CONSULTS
Care Transition Initial Assessment - RN        Met with: Patient.  DATA   Principal Problem:    Small bowel obstruction (H)  Active Problems:    SBO (small bowel obstruction) (H)       Cognitive Status: awake, alert and oriented.        Contact information and PCP information verified: Yes--Dr. Alessandro Sandoval at the [facility]--1270 discharge summary faxed to MD at [phone].  Lives With: alone      Insurance concerns: No Insurance issues identified  ASSESSMENT  Patient currently receives the following services:  none        Identified issues/concerns regarding health management: no concerns    PLAN  Financial costs for the patient include per insurance.  Patient given options and choices for discharge yes .  Patient/family is agreeable to the plan?  Yes:   Patient anticipates discharging to home .     Patient anticipates needs for home equipment: No  Transportation/person available to transport on day of discharge  is did not discuss and have they been notified/set up no  Plan/Disposition: Home   Appointments: see AVS, PCP and surgery appointments arranged.   Care  (CTS) will continue to follow as needed.    Brittany Tierney RN   [facility]   Phone [phone]

## 2020-07-24 NOTE — PROGRESS NOTES
Hospitalist Service  [facility]   [address] [place], [place] [address]  Answering Service [phone]    To whom it may concern,    Cash Cunningham was cared for by myself and the Hospitalist service at [facility] from 7/18/2020 to 7/24/2020.     I, as his Physician Assistant, recommend 2 weeks of rest prior to returning to work, then 6 weeks of light duty thereafter.    He can follow up with his primary care provider and surgical team for adjustment of these recommendations as needed.    Sincerely,         Tania Adorno PA-C

## 2020-07-24 NOTE — PROGRESS NOTES
Surgery    Feeling better today.  [place] diet. No nausea. Pain controlled with oral meds    Abdomen- soft with mild tenderness around incision.  Slight redness but no obvious erythema.  No rebound or guarding.    A/P  Overall improving.  Appreciate Urology input.  Discharge today. Instruction reviewed.    Sher Goodrich M.D.  [facility]  [phone]

## 2020-07-24 NOTE — PROGRESS NOTES
SPIRITUAL HEALTH SERVICES: Tele-Encounter  Patient Location: 35  Spoke with: n/a    Referral Source: Length of Stay    DATA:  Attempted multiple times this week to speak with pt but was pt never answered phone unfortunately.    PLAN:  No further need for telechaplaincy follow-up. Will ask weekend  to attempt on-site visit      Vik Ortiz   Intern      ______________________________    Type of service:  Telephone Visit     has received verbal consent for a TelephoneVisit from the patient? Yes    Distance Provider Location: designated [place] office or home office (secure setting)    Mode of Communication: telephone (via [employer] phone or [employer] tele-call-number ([phone]))

## 2020-07-24 NOTE — PLAN OF CARE
A/O x4. VSS on RA. PRN Oxycodone, Tylenol, and Ibuprofen given for pain. PRN Ativan given x1. 3 lap sites and midline incision with steri strips, SACHIN. BS+, flatus+, BM yesterday evening. Abdomen distended. LS clear. Voiding. Up independently in the room. Tolerating regular diet. Plan to discharge home today. Will continue to monitor.

## 2020-07-24 NOTE — DISCHARGE SUMMARY
[facility]  Hospitalist Discharge Summary      Date of Admission:  7/18/2020  Date of Discharge:  7/24/2020  Discharging Provider: Tania Adorno PA-C    Discharge Diagnoses   Recurrent SBO s/p ex lap with lysis of adhesions and small bowel resection (7/19/2020)  Pneumaturia  Post-op fever, resolved  Hypokalemia, resolved  Hypophosphatemia, resolved    Follow-ups Needed After Discharge   Follow-up Appointments     Follow-up and recommended labs and tests       Follow up with primary care provider, [facility], within 7 days for   hospital follow- up.  The following labs/tests are recommended: BMP.---An   appointment has been made for you for Friday, July 31st at 9:45 am with   Dr. Alessandro Sandoval.     Follow up with General Surgery as recommended.--See listed appointment   time for July 30th.           Unresulted Labs Ordered in the Past 30 Days of this Admission     Date and Time Order Name Status Description    7/20/2020 0604 Blood culture Preliminary     7/20/2020 0604 Blood culture Preliminary       These results will be followed up by PCP    Discharge Disposition   Discharged to home  Condition at discharge: Stable    Hospital Course   Cash Cunningham is a 39 year old male with past medical history significant for Meckel's diverticulum s/p resection and re-anastomosis (2010, [facility]).  Has subsequently had recurrent SBO, all amenable to conservative treatment. Developed typical abdominal pain related to SBOs and presented to the ED on 7/18/2020. Registered under inpatient status for further evaluation and treatment. Ultimately, pt underwent ex lap on 7/19/2020 with lysis of adhesions and laparoscopic small bowel resection.     No acute events overnight prior to discharge. Pain well managed on oral regimen. Denies nausea. Having softer BMs. Diet advanced. Seen by Surgery, ok for discharge. Denies pneumaturia today.      Recurrent SBO s/p ex lap with lysis of adhesions and  small bowel resection (7/19/2020)  Hx of Meckel's diverticulum s/p resection and re-anastomosis (2010): Since initial surgery in 2010, pt has had recurrent small bowel obstructions, ~2 per year until 2016. Seemed to do well for a few years, but developed recurrent SBO in 11/2019 and 2/2020. Presented with abdominal pain typical of prior SBOs, typically does not get N/V or require NG tube. CT A/P with SBO with transition point at the small bowel anastomosis in the mid/lower abdomen and mild mesenteric edema and free fluid without discrete, drainable fluid collections or intraperitoneal air. Pt underwent above ex lap with Dr. Goodrich on 7/19.   - General Surgery followed throughout hospitalization, follow-up as instructed   - Follow-up with PCP within 7-10 days of hospitalization   - Anagelsic regimen with PRN tylenol, ibuprofen 600 mg q6 hrs PRN, oxycodone 5-10 mg q3 hrs PRN; rx sent with patient at discharge   - Senokot-S PRN prescribed while on narcotics; pt having soft/looser stools on day of discharge   - Work note provided at discharge   - Regular diet  - Ambulate as much as possible      Pneumaturia: Developed evening of 7/21. UA on both 7/20 and 7/22 unremarkable. Reports bladder injury following prior bowel resection in 2010 requiring surgery.   - On day of discharge, denies pneumaturia.   - Urology consulted, appreciate assistance. Recommend outpatient cystoscopy in 1 month. Refer to note by Dr. Diane.      Post-op fever, resolved: Tmax 101.5 degrees fahrenheit at 0547 on 7/20. Tachycardic in the 110s with fever. WBC this AM 5.6, was up to 16.7 on 7/19. Pt did receive Cefoxitin pre-op on 7/19. Denies SOB, rashes, open sores, urinary sx. Incision sites appear C/D/I. Lactic 0.7, procal 0.24 (low risk for systemic bacterial infection). UA unremarkable. CXR with atelectasis, otherwise unremarkable.   - Last fever 7/20 overnight, afebrile since that time. WBC WNL   - Started on empiric IV Zosyn 7/20 evening,  discontinued on 7/22 due to lack of persistent fevers, BC with NGTD, WBC WNL     Hypokalemia, resolved  Hypophosphatemia, resolved: Potassium 3.3 today, phosphorus 3.0. Mg 1.9.   - Potassium 3.5 on day of discharge     Acute normocytic anemia: No active signs of bleeding. Do note some mild bruising at lap incision site near umbilicus  - Monitor              Recent Labs   Lab 07/23/20  0823 07/22/20  0859 07/21/20  0802 07/20/20  0638 07/19/20  1420   HGB 11.5* 11.0* 12.6* 13.8 15.4      Headache, resolved: Described migraine headache on 7/21. No vision changes. No focal neuro deficits. Attributed to sleep and inability to eat. No N/V. Since resolved.      Restless leg syndrome: Resume Requip 1 mg at bedtime now that diet has been advanced      Anxiety  Depression: Resume Elavil 25 mg po at bedtime now that diet has been advanced. PRN oral ativan ordered by surgical team, rx sent per Surgery at time of discharge      GI prophylaxis: Protonix 40 mg IV every day during hospitalization, resume prilosec at discharge.      Covid status: Negative per PCR on 7/18.     Consultations This Hospital Stay   SURGERY GENERAL IP CONSULT  UROLOGY IP CONSULT  UROLOGY IP CONSULT  CARE TRANSITION RN/SW IP CONSULT    Code Status   Full Code    Time Spent on this Encounter   I, Tania Adorno PA-C, personally saw the patient today and spent greater than 30 minutes discharging this patient.       Tania Adorno PA-C  [facility]  ______________________________________________________________________    Physical Exam   Vital Signs: Temp: 99.4  F (37.4  C) Temp src: Oral BP: 99/59 Pulse: 77 Heart Rate: 83 Resp: 16 SpO2: 96 % O2 Device: None (Room air)    Weight: 226 lbs 9.6 oz    CONSTITUTIONAL: Pt laying in bed, dressed in hospital garb. Appears comfortable. Cooperative with interview.   HEENT: Normocephalic, atraumatic.   CARDIOVASCULAR: RRR, no murmurs, rubs, or extra heart sounds  appreciated. Pulses +2/4 and regular in upper and lower extremities, bilaterally.   RESPIRATORY: No increased work of breathing.  CTA, bilat; no wheezes, rales, or rhonchi appreciated.  GASTROINTESTINAL:  Abdomen soft, non-distended. BS active. No abdominal binder during interview. Mild pain localized to incision site at umbilicus, mild surrounding bruising noted.   MUSCULOSKELETAL: No gross deformities noted. Normal muscle tone.   HEMATOLOGIC/LYMPHATIC/IMMUNOLOGIC: Negative for lower extremity edema, bilaterally.  NEUROLOGIC: Alert and oriented to person, place, and time.  strength intact.   SKIN: Note mild bruising at umbilicus, three other lap sites C/D/I.        Primary Care Physician   [facility]    Discharge Orders      Reason for your hospital stay    You were hospitalized for further evaluation of a small bowel obstruction requiring ex lap with lysis of adhesions and resection.     Follow-up and recommended labs and tests     Follow up with primary care provider, [facility], within 7 days for hospital follow- up.  The following labs/tests are recommended: BMP.---An appointment has been made for you for Friday, July 31st at 9:45 am with Dr. Alessandro Sandoval.     Follow up with General Surgery as recommended.--See listed appointment time for July 30th.     Activity    Your activity upon discharge: activity as tolerated     Discharge Instructions    1) Pain control with as needed tylenol, ibuprofen, and oxycodone at discharge. OK to utilize ice/heat   2) Stool softener prescribed if you should develop narcotic associated constipation   3) Advance diet as tolerated   4) Follow up with General Surgery as instructed   5) Follow up with your primary care provider within 7-10 days of hospitalization   6) No additional medication changes made  7) Follow up with Urology as recommended, call clinic number (you were provided card by Urologist)   8) Ativan prescribed as needed for anxiety by the Surgical team   9)  Do not drive or drink alcohol while using oxycodone. Note coadministration of oxycodone and ativan can cause increased lethargy and confusion.     Full Code     Diet    Follow this diet upon discharge: Orders Placed This Encounter      Regular Diet Adult       Significant Results and Procedures   Most Recent 3 CBC's:  Recent Labs   Lab Test 07/24/20  0744 07/23/20  0823 07/22/20  0859 07/21/20  0802 07/20/20  0638   WBC  --  6.5  --  6.1 5.6   HGB 11.5* 11.5* 11.0* 12.6* 13.8   MCV  --  84  --  85 86   PLT  --  192  --  180 184     Most Recent 3 BMP's:  Recent Labs   Lab Test 07/24/20  0744 07/23/20  1517 07/23/20  0823  07/22/20  0859  07/21/20  0802  07/20/20  0638   NA  --   --   --   --  137  --  136  --  137   POTASSIUM 3.5 3.7 3.3*   < > 3.1*   < > 3.3*   < > 3.1*   CHLORIDE  --   --   --   --  106  --  106  --  105   CO2  --   --   --   --  26  --  25  --  26   BUN  --   --   --   --  9  --  11  --  13   CR  --   --   --   --  0.93  --  0.94  --  1.01   ANIONGAP  --   --   --   --  5  --  5  --  6   ANGELLA  --   --   --   --  8.2*  --  8.4*  --  7.8*   GLC  --   --   --   --  98  --  118*  --  133*    < > = values in this interval not displayed.     Most Recent 2 LFT's:  Recent Labs   Lab Test 07/18/20  1312 02/11/20  0709   AST 22 29   ALT 52 63   ALKPHOS 68 72   BILITOTAL 0.5 0.6   ,   Results for orders placed or performed during the hospital encounter of 07/18/20   CT Abdomen Pelvis w Contrast    Narrative    CT ABDOMEN PELVIS W CONTRAST 7/18/2020 1:45 PM    CLINICAL HISTORY: check for SBO, abdominal pain and vomiting    TECHNIQUE: CT scan of the abdomen and pelvis was performed following  injection of IV contrast. Multiplanar reformats were obtained. Dose  reduction techniques were used.  CONTRAST: 115mL Isovue-370    COMPARISON: CT abdomen and pelvis 2/11/2020    FINDINGS:   LOWER CHEST: Normal.    HEPATOBILIARY: Focal fat near the falciform. No calcified gallstones  or biliary dilation.    PANCREAS: No  acute peripancreatic inflammatory changes.    SPLEEN: Normal.    ADRENAL GLANDS: Normal.    KIDNEYS/BLADDER: Both kidneys enhance symmetrically. No  hydronephrosis. Subcentimeter hypodensities in both kidneys, most  likely representing cysts, do not require further imaging evaluation.  The bladder is decompressed.    BOWEL: Tiny sliding hiatal hernia. Gastric distention. Again seen are  several loops of mildly dilated small bowel with transition point at  an enteroenterostomy in the lower abdomen this is overall similar but  less extensive compared to 2/11/2020. There is fecalization of the  intraluminal contents proximal to the anastomosis. Again noted is mild  mesenteric edema and fluid. No bowel wall pneumatosis. Normal  appendix. Normal appearance to the colon.    PELVIC ORGANS: Normal.    LYMPH NODES: No abdominopelvic lymphadenopathy.    ADDITIONAL FINDINGS: Trace free fluid. No intraperitoneal free air. No  discrete drainable fluid collections in the abdomen or pelvis.    MUSCULOSKELETAL: No significant osseous abnormality.      Impression    IMPRESSION:   1.  Small bowel obstruction with transition point at the small bowel  anastomosis in the mid/lower abdomen. Findings are similar, but less  severe compared to 2/11/2020.  2.  Mild mesenteric edema and free fluid. No discrete drainable fluid  collections or intraperitoneal free air.    MAHOGANY DELGADO MD   XR Chest 2 Views    Narrative    EXAM: XR CHEST 2 VW  LOCATION: [facility]  DATE/TIME: 7/20/2020 6:44 PM    INDICATION: ; fever, rule out pneumonia;  COMPARISON: None.      Impression    IMPRESSION: Mild bandlike density at the left base medially consistent with pulmonary abscess atelectasis. Lungs otherwise clear. No pleural effusion or pneumothorax.  Normal heart size.       Discharge Medications   Current Discharge Medication List      START taking these medications    Details   acetaminophen (TYLENOL) 325 MG tablet Take 2 tablets (650 mg)  by mouth every 4 hours as needed for mild pain  Qty:      Associated Diagnoses: Small bowel obstruction (H)      ibuprofen (ADVIL/MOTRIN) 600 MG tablet Take 1 tablet (600 mg) by mouth every 6 hours as needed for moderate pain  Qty:      Associated Diagnoses: Small bowel obstruction (H)      LORazepam (ATIVAN) 0.5 MG tablet Take 1 tablet (0.5 mg) by mouth every 8 hours as needed for anxiety  Qty: 10 tablet, Refills: 0    Associated Diagnoses: Small bowel obstruction (H)      oxyCODONE (ROXICODONE) 5 MG tablet Take 1-2 tablets (5-10 mg) by mouth every 3 hours as needed for severe pain  Qty: 20 tablet, Refills: 0    Associated Diagnoses: Small bowel obstruction (H)      senna-docusate (SENOKOT-S/PERICOLACE) 8.6-50 MG tablet Take 1-2 tablets by mouth 2 times daily as needed for constipation  Qty: 30 tablet, Refills: 0    Associated Diagnoses: Drug-induced constipation         CONTINUE these medications which have NOT CHANGED    Details   albuterol (PROAIR HFA/PROVENTIL HFA/VENTOLIN HFA) 108 (90 Base) MCG/ACT inhaler Inhale 2 puffs into the lungs every 6 hours as needed for shortness of breath / dyspnea or wheezing    Comments: Pharmacy may dispense brand covered by insurance (Proair, or proventil or ventolin or generic albuterol inhaler)      amitriptyline (ELAVIL) 25 MG tablet Take 25 mg by mouth At Bedtime      azelastine (ASTELIN) 0.1 % nasal spray Spray 1-2 sprays into both nostrils 2 times daily as needed       finasteride (PROPECIA) 1 MG tablet Take 1 mg by mouth daily      ketotifen (ZADITOR/REFRESH ANTI-ITCH) 0.025 % ophthalmic solution Place 1 drop into both eyes 2 times daily as needed       Magnesium Oxide 250 MG TABS Take 500 mg by mouth daily      omeprazole 20 MG tablet Take 20 mg by mouth daily      rOPINIRole (REQUIP) 1 MG tablet Take 1 mg by mouth At Bedtime           Allergies   Allergies   Allergen Reactions     Cat Hair Extract Itching     eye lids may swell     Morphine Itching     Trazodone Other  (See Comments)     Muscle spasms

## 2020-07-25 ENCOUNTER — NURSE TRIAGE (OUTPATIENT)
Dept: NURSING | Facility: CLINIC | Age: 40
End: 2020-07-25

## 2020-07-25 NOTE — TELEPHONE ENCOUNTER
"Patient calling to request medication refills after his hospital discharge yesterday.  Is almost out of lorazepam and oxycodone prescriptions.      Patient has been taking tylenol and ibuprofen, along with oxycodone.  When oxycodone is wearing off, pain starts at a  5/10 and gets up to a 8-9/10 after approx 30 minutes to 1 hour and he \"can barely move\".      Advised patient that surgeons are not able to provide refills on these medications over the weekend off site.  Patient was insitent that he was told when he left the hospital to call back if anything needed with meds.    On-call surgeon paged Dr. Aguilar Jeffries at 4:28pm.  Unable to renew over the weekend.  If his pain level is getting to be unbearable, then patient needs to present to the ER for further evaluation and pain management.    Called patient back and provided information from provider.  Discussed some planning for decreasing use of pain medications, as still utilizing 2 oxycodone every time and on a schedule.  Discussed decreasing amount and possible frequency, but keeping Tylenol and Ibuprofen at scheduled times for pain management.  Patient had no further questions.  Encouraged ot call back with any other issues.    Brittany West RN on 7/25/2020 at 4:40 PM                              Reason for Disposition    Caller requesting a NON-URGENT new prescription or refill and triager unable to refill per unit policy    Additional Information    Negative: Drug overdose and nurse unable to answer question    Negative: Caller requesting information not related to medicine    Negative: Caller requesting a prescription for Strep throat and has a positive culture result    Negative: Rash while taking a medication or within 3 days of stopping it    Negative: Immunization reaction suspected    Negative: [1] Asthma AND [2] having symptoms of asthma (cough, wheezing, etc)    Negative: MORE THAN A DOUBLE DOSE of a prescription or over-the-counter (OTC) " "drug    Negative: [1] DOUBLE DOSE (an extra dose or lesser amount) of over-the-counter (OTC) drug AND [2] any symptoms (e.g., dizziness, nausea, pain, sleepiness)    Negative: [1] DOUBLE DOSE (an extra dose or lesser amount) of prescription drug AND [2] any symptoms (e.g., dizziness, nausea, pain, sleepiness)    Negative: Took another person's prescription drug    Negative: [1] DOUBLE DOSE (an extra dose or lesser amount) of prescription drug AND [2] NO symptoms (Exception: a double dose of antibiotics)    Negative: Diabetes drug error or overdose (e.g., insulin or extra dose)    Negative: [1] Request for URGENT new prescription or refill of \"essential\" medication (i.e., likelihood of harm to patient if not taken) AND [2] triager unable to fill per unit policy    Negative: [1] Prescription not at pharmacy AND [2] was prescribed today by PCP    Negative: Pharmacy calling with prescription questions and triager unable to answer question    Negative: Caller has urgent medication question about med that PCP prescribed and triager unable to answer question    Negative: Caller has NON-URGENT medication question about med that PCP prescribed and triager unable to answer question    Protocols used: MEDICATION QUESTION CALL-A-AH      "

## 2020-07-26 ENCOUNTER — HOSPITAL ENCOUNTER (EMERGENCY)
Facility: CLINIC | Age: 40
Discharge: HOME | End: 2020-07-26
Attending: EMERGENCY MEDICINE | Admitting: EMERGENCY MEDICINE
Payer: COMMERCIAL

## 2020-07-26 ENCOUNTER — NURSE TRIAGE (OUTPATIENT)
Dept: NURSING | Facility: CLINIC | Age: 40
End: 2020-07-26

## 2020-07-26 ENCOUNTER — APPOINTMENT (OUTPATIENT)
Dept: CT IMAGING | Facility: CLINIC | Age: 40
End: 2020-07-26
Attending: EMERGENCY MEDICINE
Payer: COMMERCIAL

## 2020-07-26 VITALS
RESPIRATION RATE: 14 BRPM | OXYGEN SATURATION: 96 % | DIASTOLIC BLOOD PRESSURE: 73 MMHG | TEMPERATURE: 98.4 F | HEART RATE: 67 BPM | WEIGHT: 226 LBS | BODY MASS INDEX: 31.64 KG/M2 | HEIGHT: 71 IN | SYSTOLIC BLOOD PRESSURE: 117 MMHG

## 2020-07-26 DIAGNOSIS — T81.30XA WOUND DEHISCENCE: ICD-10-CM

## 2020-07-26 DIAGNOSIS — L03.90 CELLULITIS, UNSPECIFIED CELLULITIS SITE: ICD-10-CM

## 2020-07-26 LAB
ALBUMIN SERPL-MCNC: 2.9 G/DL (ref 3.4–5)
ALP SERPL-CCNC: 202 U/L (ref 40–150)
ALT SERPL W P-5'-P-CCNC: 90 U/L (ref 0–70)
ANION GAP SERPL CALCULATED.3IONS-SCNC: 4 MMOL/L (ref 3–14)
AST SERPL W P-5'-P-CCNC: 58 U/L (ref 0–45)
BACTERIA SPEC CULT: NO GROWTH
BACTERIA SPEC CULT: NO GROWTH
BASOPHILS # BLD AUTO: 0 10E9/L (ref 0–0.2)
BASOPHILS NFR BLD AUTO: 0.4 %
BILIRUB SERPL-MCNC: 0.7 MG/DL (ref 0.2–1.3)
BUN SERPL-MCNC: 9 MG/DL (ref 7–30)
CALCIUM SERPL-MCNC: 8.5 MG/DL (ref 8.5–10.1)
CHLORIDE SERPL-SCNC: 106 MMOL/L (ref 94–109)
CO2 SERPL-SCNC: 27 MMOL/L (ref 20–32)
CREAT SERPL-MCNC: 0.88 MG/DL (ref 0.66–1.25)
DIFFERENTIAL METHOD BLD: ABNORMAL
EOSINOPHIL # BLD AUTO: 0.2 10E9/L (ref 0–0.7)
EOSINOPHIL NFR BLD AUTO: 2.9 %
ERYTHROCYTE [DISTWIDTH] IN BLOOD BY AUTOMATED COUNT: 14.2 % (ref 10–15)
GFR SERPL CREATININE-BSD FRML MDRD: >90 ML/MIN/{1.73_M2}
GLUCOSE SERPL-MCNC: 124 MG/DL (ref 70–99)
HCT VFR BLD AUTO: 36.1 % (ref 40–53)
HGB BLD-MCNC: 12 G/DL (ref 13.3–17.7)
IMM GRANULOCYTES # BLD: 0.1 10E9/L (ref 0–0.4)
IMM GRANULOCYTES NFR BLD: 0.9 %
LACTATE BLD-SCNC: 0.9 MMOL/L (ref 0.7–2)
LYMPHOCYTES # BLD AUTO: 1.9 10E9/L (ref 0.8–5.3)
LYMPHOCYTES NFR BLD AUTO: 27.3 %
MCH RBC QN AUTO: 28.3 PG (ref 26.5–33)
MCHC RBC AUTO-ENTMCNC: 33.2 G/DL (ref 31.5–36.5)
MCV RBC AUTO: 85 FL (ref 78–100)
MONOCYTES # BLD AUTO: 0.8 10E9/L (ref 0–1.3)
MONOCYTES NFR BLD AUTO: 10.9 %
NEUTROPHILS # BLD AUTO: 4 10E9/L (ref 1.6–8.3)
NEUTROPHILS NFR BLD AUTO: 57.6 %
NRBC # BLD AUTO: 0 10*3/UL
NRBC BLD AUTO-RTO: 0 /100
PLATELET # BLD AUTO: 288 10E9/L (ref 150–450)
POTASSIUM SERPL-SCNC: 3.9 MMOL/L (ref 3.4–5.3)
PROT SERPL-MCNC: 6.6 G/DL (ref 6.8–8.8)
RBC # BLD AUTO: 4.24 10E12/L (ref 4.4–5.9)
SODIUM SERPL-SCNC: 137 MMOL/L (ref 133–144)
SPECIMEN SOURCE: NORMAL
SPECIMEN SOURCE: NORMAL
WBC # BLD AUTO: 7 10E9/L (ref 4–11)

## 2020-07-26 PROCEDURE — 25000128 H RX IP 250 OP 636: Performed by: EMERGENCY MEDICINE

## 2020-07-26 PROCEDURE — 96365 THER/PROPH/DIAG IV INF INIT: CPT | Mod: 59

## 2020-07-26 PROCEDURE — 83605 ASSAY OF LACTIC ACID: CPT | Performed by: EMERGENCY MEDICINE

## 2020-07-26 PROCEDURE — 25800030 ZZH RX IP 258 OP 636: Performed by: EMERGENCY MEDICINE

## 2020-07-26 PROCEDURE — 85025 COMPLETE CBC W/AUTO DIFF WBC: CPT | Performed by: EMERGENCY MEDICINE

## 2020-07-26 PROCEDURE — 96375 TX/PRO/DX INJ NEW DRUG ADDON: CPT

## 2020-07-26 PROCEDURE — 80053 COMPREHEN METABOLIC PANEL: CPT | Performed by: EMERGENCY MEDICINE

## 2020-07-26 PROCEDURE — 99285 EMERGENCY DEPT VISIT HI MDM: CPT | Mod: 25

## 2020-07-26 PROCEDURE — 74177 CT ABD & PELVIS W/CONTRAST: CPT

## 2020-07-26 PROCEDURE — 25000125 ZZHC RX 250: Performed by: EMERGENCY MEDICINE

## 2020-07-26 RX ORDER — CEFTRIAXONE 2 G/1
2 INJECTION, POWDER, FOR SOLUTION INTRAMUSCULAR; INTRAVENOUS ONCE
Status: COMPLETED | OUTPATIENT
Start: 2020-07-26 | End: 2020-07-26

## 2020-07-26 RX ORDER — IOPAMIDOL 755 MG/ML
114 INJECTION, SOLUTION INTRAVASCULAR ONCE
Status: COMPLETED | OUTPATIENT
Start: 2020-07-26 | End: 2020-07-26

## 2020-07-26 RX ORDER — CEPHALEXIN 500 MG/1
500 CAPSULE ORAL 3 TIMES DAILY
Qty: 30 CAPSULE | Refills: 0 | Status: SHIPPED | OUTPATIENT
Start: 2020-07-26 | End: 2020-08-13

## 2020-07-26 RX ORDER — HYDROMORPHONE HYDROCHLORIDE 1 MG/ML
0.5 INJECTION, SOLUTION INTRAMUSCULAR; INTRAVENOUS; SUBCUTANEOUS
Status: COMPLETED | OUTPATIENT
Start: 2020-07-26 | End: 2020-07-26

## 2020-07-26 RX ORDER — SODIUM CHLORIDE 9 MG/ML
INJECTION, SOLUTION INTRAVENOUS CONTINUOUS
Status: DISCONTINUED | OUTPATIENT
Start: 2020-07-26 | End: 2020-07-26 | Stop reason: HOSPADM

## 2020-07-26 RX ORDER — ONDANSETRON 2 MG/ML
4 INJECTION INTRAMUSCULAR; INTRAVENOUS EVERY 30 MIN PRN
Status: DISCONTINUED | OUTPATIENT
Start: 2020-07-26 | End: 2020-07-26 | Stop reason: HOSPADM

## 2020-07-26 RX ORDER — SULFAMETHOXAZOLE/TRIMETHOPRIM 800-160 MG
1 TABLET ORAL 2 TIMES DAILY
Qty: 20 TABLET | Refills: 0 | Status: SHIPPED | OUTPATIENT
Start: 2020-07-26 | End: 2020-08-13

## 2020-07-26 RX ADMIN — IOPAMIDOL 114 ML: 755 INJECTION, SOLUTION INTRAVENOUS at 10:23

## 2020-07-26 RX ADMIN — CEFTRIAXONE SODIUM 2 G: 2 INJECTION, POWDER, FOR SOLUTION INTRAMUSCULAR; INTRAVENOUS at 11:01

## 2020-07-26 RX ADMIN — SODIUM CHLORIDE 1000 ML: 9 INJECTION, SOLUTION INTRAVENOUS at 09:31

## 2020-07-26 RX ADMIN — ONDANSETRON 4 MG: 2 INJECTION INTRAMUSCULAR; INTRAVENOUS at 09:25

## 2020-07-26 RX ADMIN — HYDROMORPHONE HYDROCHLORIDE 0.5 MG: 1 INJECTION, SOLUTION INTRAMUSCULAR; INTRAVENOUS; SUBCUTANEOUS at 09:25

## 2020-07-26 RX ADMIN — SODIUM CHLORIDE 73 ML: 9 INJECTION, SOLUTION INTRAVENOUS at 10:23

## 2020-07-26 ASSESSMENT — ENCOUNTER SYMPTOMS
SHORTNESS OF BREATH: 0
WOUND: 1
COUGH: 0
CHILLS: 0
FEVER: 1
ABDOMINAL PAIN: 1

## 2020-07-26 ASSESSMENT — MIFFLIN-ST. JEOR: SCORE: 1972.26

## 2020-07-26 NOTE — TELEPHONE ENCOUNTER
Father Chris is calling and states that Cash had intestinal surgery on 7/19 and now incision is red.      COVID 19 Nurse Triage Plan/Patient Instructions    Please be aware that novel coronavirus (COVID-19) may be circulating in the community. If you develop symptoms such as fever, cough, or SOB or if you have concerns about the presence of another infection including coronavirus (COVID-19), please contact your health care provider or visit [email or web address].     Disposition/Instructions    ED Visit recommended. Follow protocol based instructions.     Bring Your Own Device:  Please also bring your smart device(s) (smart phones, tablets, laptops) and their charging cables for your personal use and to communicate with your care team during your visit.    Thank you for taking steps to prevent the spread of this virus.  o Limit your contact with others.  o Wear a simple mask to cover your cough.  o Wash your hands well and often.    Resources    [facility]: About COVID-19: [email or web address]    CDC: What to Do If You're Sick: www.cdc.gov/coronavirus/2019-ncov/about/steps-when-sick.html    CDC: Ending Home Isolation: www.cdc.gov/coronavirus/2019-ncov/hcp/disposition-in-home-patients.html     CDC: Caring for Someone: www.cdc.gov/coronavirus/2019-ncov/if-you-are-sick/care-for-someone.html     [facility]: Interim Guidance for Hospital Discharge to Home: www.health[facility]/diseases/coronavirus/hcp/hospdischarge.pdf    [facility] clinical trials (COVID-19 research studies): clinicalaffairs.[facility]-clinical-trials     Below are the COVID-19 hotlines at the [facility] ([facility]). Interpreters are available.   o For health questions: Call 768-986-0635 or 1-834.172.3170 (7 a.m. to 7 p.m.)  o For questions about schools and childcare: Call 052-258-0263 or 1-939.619.3519 (7 a.m. to 7 p.m.)                       Additional Information    Negative: Major abdominal surgical incision and  wound gaping open with visible internal organs    Negative: Sounds like a life-threatening emergency to the triager    Negative: Bleeding from incision and won't stop after 10 minutes of direct pressure    Negative: Widespread rash and bright red, sunburn-like    Negative: Severe pain in the incision    Negative: Incision gaping open and < 2 days (48 hours) since wound re-opened    Negative: Incision gaping open and length of opening > 2 inches (5 cm)    Negative: Patient sounds very sick or weak to the triager    Negative: Sounds like a serious complication to the triager    Negative: Fever > 100.4 F (38.0 C)    Incision looks infected (spreading redness, pain)    Protocols used: POST-OP INCISION SYMPTOMS AND [id number]

## 2020-07-26 NOTE — ED AVS SNAPSHOT
Emergency Department  [address] [place] [id number]  Phone:  [phone]  Fax:  [phone]                                    Cash Cunningham   MRN: [id number]    Department:   Emergency Department   Date of Visit:  7/26/2020           After Visit Summary Signature Page    I have received my discharge instructions, and my questions have been answered. I have discussed any challenges I see with this plan with the nurse or doctor.    ..........................................................................................................................................  Patient/Patient Representative Signature      ..........................................................................................................................................  Patient Representative Print Name and Relationship to Patient    ..................................................               ................................................  Date                                   Time    ..........................................................................................................................................  Reviewed by Signature/Title    ...................................................              ..............................................  Date                                               Time          22EPIC Rev 08/18

## 2020-07-26 NOTE — ED PROVIDER NOTES
"  History     Chief Complaint:  Post-op Problem    HPI   Cash Cunningham is a 39 year old male with a history of recurrent SBO s/p ex alp with lysis of adhesions and small bowel resection who presents with a post op problem. The patient was admitted to the hospital on 7/18/20 with a small bowel obstruction due to a bag of carrots and scar tissue. Today he presents with leakage from the incision site. He notes 2 days ago he developed redness and warmth to the incision site. He endorses fever.     Allergies:  Morphine  Trazodone     Medications:    Albuterol  amitriptyline  azelastine  Finasteride  Ketotifen  Ativan   Omeprazole   Ropinirole  Senna-docusate     Past Medical History:    SBO  Depressive disorder  Hiatal hernia  Left groin hernia  Restless leg syndrome     Past Surgical History:    Laparoscopic assisted colectomy   Remove fistula anal x2  Small bowel resection  Tonsillectomy and Adenoidectomy       Family History:    History reviewed. No pertinent family history.      Social History:  Smoking status- never smoker  Alcohol use- no  Drug use- no   Marital Status:   [2]     Review of Systems   Constitutional: Positive for fever. Negative for chills.   Respiratory: Negative for cough and shortness of breath.    Cardiovascular: Negative for chest pain.   Gastrointestinal: Positive for abdominal pain.   Skin: Positive for rash and wound.   All other systems reviewed and are negative.      Physical Exam     Patient Vitals for the past 24 hrs:   BP Temp Temp src Heart Rate Resp SpO2 Height Weight   07/26/20 0913 124/80 98.4  F (36.9  C) Oral 82 14 96 % 1.803 m (5' 11\") 102.5 kg (226 lb)       Physical Exam  Vitals: reviewed by me  General: Pt seen on [facility], pleasant, cooperative, and alert to conversation  Eyes: Tracking well, clear conjunctiva BL  ENT: MMM, midline trachea.   Lungs:  No tachypnea, no accessory muscle use. No respiratory distress.   CV: Rate as above, regular rhythm.    Abd: Soft, " non tender, no guarding, no rebound. Non distended.  Does have small amount of wound dehiscence and erythema surrounding a low midline vertical incision.  No abscess, no pus, scant serosanguineous drainage noted.  MSK: no peripheral edema or joint effusion.  No evidence of trauma  Skin: No rash, normal turgor and temperature  Neuro: Clear speech and no facial droop.  Psych: Not RIS, no e/o AH/VH      Emergency Department Course     Imaging:  Radiology findings were communicated with the patient who voiced understanding of the findings.    CT Abdomen/Pelvis w Contrast:  1.  Interval postoperative changes involving the anterior abdominal  wall. No evidence for abscess.  2.  There are multiple mildly prominent fluid-filled loops of small  bowel in the midabdomen, but no transition point to suggest mechanical  small bowel obstruction. Findings may be related to ileus.  3.  Small amount of air within the urinary bladder may be related to  recent instrumentation.    As read by Radiology.    Laboratory:  Laboratory findings were communicated with the patient who voiced understanding of the findings.    CBC: RBC Count 4.24 (L), HGB 12.0 (L), Hematocrit 36.1 (L) o/w WNL (WBC 7.0, )   CMP: Glucose 124 (H), Albumin 2.9 (L), Protein Total 6.6 (L), alkaline phosphate 202 (H),  (H), AST 58 (H) o/w WNL (Creatinine 0.88)     Lactic Acid: 0.9     Interventions:  0925 Zofran 4 mg IV    0925 Dilaudid 0.5 mg IV    0931 NS 1L IV Bolus     1101 Rocephin 2 g IV    Emergency Department Course:  Past medical records, nursing notes, and vitals reviewed.    0902 I performed an exam of the patient as documented above.      IV was inserted and blood was drawn for laboratory testing, results above.     The patient was sent for a CT abdomen/pelvis while in the emergency department, results above.     1145 I rechecked the patient and discussed the results of his workup thus far.     Findings and plan explained to the Patient. Patient  discharged home with instructions regarding supportive care, medications, and reasons to return. The importance of close follow-up was reviewed.    Impression & Plan   Covid-19  Cash Cunningham was evaluated during a global COVID-19 pandemic, which necessitated consideration that the patient might be at risk for infection with the SARS-CoV-2 virus that causes COVID-19.   Applicable protocols for evaluation were followed during the patient's care.   COVID-19 was considered as part of the patient's evaluation. The plan for testing is:  the patient was referred for outpatient testing.    Medical Decision Making:  Cash Cunningham is a very pleasant 39 year old male who presents to ER with what appears to be a cellulitic wound infection and wound dehiscence at the surgical site. He has a small amount of dehiscence in the inferior border of his wound. He has minimal pain, normal CT scan of the abdomen, no fever, no tachycardia, and no WBC count. I spoke with Dr. Jeffries, covering for Dr. Goodrich, who did the surgery 8 days ago. We both agreed that he is stable for outpatient management, and he can follow with Dr. Goodrich early this week. Will give antibiotics and will advise Tylenol and motrin for pain.     Diagnosis:    ICD-10-CM   1. Cellulitis, unspecified cellulitis site  L03.90   2. Wound dehiscence  T81.30XA     Disposition:  Discharged to home.    Discharge Medications:  New Prescriptions    CEPHALEXIN (KEFLEX) 500 MG CAPSULE    Take 1 capsule (500 mg) by mouth 3 times daily for 10 days    SULFAMETHOXAZOLE-TRIMETHOPRIM (BACTRIM DS) 800-160 MG TABLET    Take 1 tablet by mouth 2 times daily for 10 days       Scribe Disclosure:  I, Camila Puckett, am serving as a scribe at 9:09 AM on 7/26/2020 to document services personally performed by Cali Anthony MD based on my observations and the provider's statements to me.          Cali Anthony MD  07/26/20 9663

## 2020-07-26 NOTE — DISCHARGE INSTRUCTIONS
It looks like your wound is infected, please take Tylenol and Motrin for the pain, and take the antibiotics that we have given you.  Your CT scan is reassuring, and there is no abscess.  Please do follow with your surgeon in the next 48 to 72 hours.

## 2020-07-27 ENCOUNTER — TELEPHONE (OUTPATIENT)
Dept: SURGERY | Facility: CLINIC | Age: 40
End: 2020-07-27

## 2020-07-27 NOTE — TELEPHONE ENCOUNTER
Procedure:  1. Exploratory laparoscopy  2. Laparoscopic lysis of adhesions  3. Laparoscopic assisted small bowel resection    Date: 07/19/2020    Surgeon: Joleen    Patient was in ED yesterday for infection at incision.    He was given IV rocephin and sent home on oral keflex and bactrim.  It does not like wound drainage was cultured during ED visit.    His post op was scheduled for this Thursday with Dr. Goodrich. Will have patient come in tomorrow instead.    Advised patient to go to ED if symptoms become worse before tomorrow.    Encouraged him to change dressing as it becomes saturated.    He agreed and will call or go to ED with worsening symptoms.    Kay Palacios RN-BSN

## 2020-07-28 ENCOUNTER — OFFICE VISIT (OUTPATIENT)
Dept: SURGERY | Facility: CLINIC | Age: 40
End: 2020-07-28
Payer: COMMERCIAL

## 2020-07-28 DIAGNOSIS — Z09 SURGERY FOLLOW-UP EXAMINATION: Primary | ICD-10-CM

## 2020-07-28 PROCEDURE — 99024 POSTOP FOLLOW-UP VISIT: CPT | Performed by: SURGERY

## 2020-07-28 NOTE — LETTER
[facility]    [address], [address]  [place], [place] [address]  Phone [phone]  Fax [phone] [address], [address]  [place] [address]  Phone [phone]  Fax [phone]    [email or web address]     2020    Re: Cash Cunningham  : 1980      Surgery Postoperative Note     Doing better. Went to the ER with a wound infection on . Placed on ABX and is doing much better. Still having a small amount of bloody drainage. Tolerating diet but slight nausea from antibiotics. Having regular Bowel movements. Pain controlled with occasional narcotics.     Abdomen Soft Non-Tender entire abdomen No hernias palpated. Incision-Mild serous drainage. No erythema     A/P  Cash Coxpriyanka is recovering well from a exploratory laparoscopy and small bowel resection. Resolving wound infection. Looks great with minimal drainage at this time. Will switch abx today due to side effects. Hopefully will resolve without further issues. I advised him to return to the office if it does not improve or worsened.  He can otherwise slowly return to regular activity. I expect he will make a complete recovery without issues.     Thank you for the opportunity to help in his care.     Sher Goodrich M.D.  [facility]  [phone]

## 2020-07-29 NOTE — PROGRESS NOTES
Surgery Postoperative Note    Doing better. Went to the ER with a wound infection on Sunday. Placed on ABX and is doing much better. Still having a small amount of bloody drainage. Tolerating diet but slight nausea from antibiotics. Having regular Bowel movements. Pain controlled with occasional narcotics.    Abdomen Soft Non-Tender entire abdomen No hernias palpated. Incision-Mild serous drainage. No erythema    A/P  Cash Cunningham is recovering well from a exploratory laparoscopy and small bowel resection. Resolving wound infection. Looks great with minimal drainage at this time. Will switch abx today due to side effects. Hopefully will resolve without further issues. I advised him to return to the office if it does not improve or worsened.  He can otherwise slowly return to regular activity. I expect he will make a complete recovery without issues.    Thank you for the opportunity to help in his care.    Sher Goodrich M.D.  [facility]  [phone]    Please route or send letter to:  Primary Care Provider (PCP) and Referring Provider

## 2020-08-13 ENCOUNTER — OFFICE VISIT (OUTPATIENT)
Dept: SURGERY | Facility: CLINIC | Age: 40
End: 2020-08-13
Payer: COMMERCIAL

## 2020-08-13 VITALS
HEIGHT: 71 IN | DIASTOLIC BLOOD PRESSURE: 80 MMHG | BODY MASS INDEX: 31.08 KG/M2 | SYSTOLIC BLOOD PRESSURE: 112 MMHG | WEIGHT: 222 LBS

## 2020-08-13 DIAGNOSIS — Z09 SURGERY FOLLOW-UP EXAMINATION: Primary | ICD-10-CM

## 2020-08-13 PROCEDURE — 99024 POSTOP FOLLOW-UP VISIT: CPT | Performed by: SURGERY

## 2020-08-13 ASSESSMENT — MIFFLIN-ST. JEOR: SCORE: 1954.12

## 2020-08-13 NOTE — PROGRESS NOTES
Surgery Postoperative Note    Still having a very small amount of drainage.  Feels his preoperative symptoms are completely resolved and he has not felt this well for some time.  Still complaining of pinky numbness but does not have any weakness or other complaints.    Abdomen soft without tenderness.  Small opening measuring 3 mm along midline incision.  Probed approxi-1 to 2 mm.  Minimal drainage.  Silver nitrate applied.    A/P  Cash Cunningham is recovering well a laparoscopic assisted bowel resection.  His wound is coming along well.  There was proud flesh that was cauterized with silver nitrate.  I would expect this to stop draining in the very near future.  All other complaints are resolved.  He does complain of some pinky numbness without motor defect.  He states it is not bothering him but I did offer him a referral to neurology if the symptoms persist.    Thank you for the opportunity to help in his care.    Sher Goodrich M.D.  [facility]  [phone]    Please route or send letter to:  Primary Care Provider (PCP) and Referring Provider

## 2020-08-18 ENCOUNTER — OFFICE VISIT (OUTPATIENT)
Dept: SURGERY | Facility: CLINIC | Age: 40
End: 2020-08-18
Payer: COMMERCIAL

## 2020-08-18 VITALS — TEMPERATURE: 98.5 F | SYSTOLIC BLOOD PRESSURE: 120 MMHG | HEART RATE: 77 BPM | DIASTOLIC BLOOD PRESSURE: 78 MMHG

## 2020-08-18 DIAGNOSIS — Z09 SURGERY FOLLOW-UP EXAMINATION: Primary | ICD-10-CM

## 2020-08-18 PROCEDURE — 99024 POSTOP FOLLOW-UP VISIT: CPT | Performed by: SURGERY

## 2020-08-18 NOTE — PROGRESS NOTES
Surgery Postoperative Note    Still having a very small amount of drainage and noticed a purple suture in the upper wound.  Also has a small area from the bottom portion.  No fevers or chills.  No other complaints today.    Abdomen soft without tenderness.  Opening slightly larger than previous visit.  Probable Vicryl suture seen and removed.  Wound probed 3 to 4 mm.  Packed with iodoform..    A/P  Cash Cunningham is recovering well a laparoscopic assisted bowel resection.  He returns with a suture granuloma.  Suture was removed today.  There are no signs of infection to warrant antibiotics at this time.  I suggest he packed the wound with iodoform packing and return to the clinic next week.  He will call if any new changes.    Thank you for the opportunity to help in his care.    Sher Goodrich M.D.  [facility]  [phone]    Please route or send letter to:  Primary Care Provider (PCP) and Referring Provider

## 2020-08-21 DIAGNOSIS — R31.9 HEMATURIA: Primary | ICD-10-CM

## 2020-08-25 ENCOUNTER — OFFICE VISIT (OUTPATIENT)
Dept: SURGERY | Facility: CLINIC | Age: 40
End: 2020-08-25
Payer: COMMERCIAL

## 2020-08-25 DIAGNOSIS — Z09 SURGERY FOLLOW-UP EXAMINATION: Primary | ICD-10-CM

## 2020-08-25 PROCEDURE — 99024 POSTOP FOLLOW-UP VISIT: CPT | Performed by: SURGERY

## 2020-08-25 NOTE — LETTER
[facility]    [address], [address]  [place], [place] [address]  Phone [phone]  Fax [phone] [address], [address]  [place] [address]  Phone [phone]  Fax [phone]    [email or web address]     2020    Re: Cash Cunningham  : 1980      Surgery Postoperative Note     Wound healing well. No significant drainage having difficulty packing due to small size.      Abdomen soft without tenderness.  Small opening size of Q-tip. Silver nitrate applied.      A/P  Cash Cunningham is recovering well a laparoscopic assisted bowel resection.  Healing well. Likely wont be able to pack much longer. Expect it to heal without ongoing issues. RTC PRN.     Thank you for the opportunity to help in his care.     Sher Goodrich M.D.  [facility]  [phone]

## 2020-08-25 NOTE — PROGRESS NOTES
Surgery Postoperative Note    Wound healing well. No significant drainage having difficulty packing due to small size.     Abdomen soft without tenderness.  Small opening size of Q-tip. Silver nitrate applied.     A/P  Cash STEVENS Octavio is recovering well a laparoscopic assisted bowel resection.  Healing well. Likely wont be able to pack much longer. Expect it to heal without ongoing issues. RTC PRN.    Thank you for the opportunity to help in his care.    Sher Goodrich M.D.  [facility]  [phone]    Please route or send letter to:  Primary Care Provider (PCP) and Referring Provider

## 2020-08-31 ENCOUNTER — MYC MEDICAL ADVICE (OUTPATIENT)
Dept: SURGERY | Facility: CLINIC | Age: 40
End: 2020-08-31

## 2020-11-14 ENCOUNTER — HEALTH MAINTENANCE LETTER (OUTPATIENT)
Age: 40
End: 2020-11-14

## 2021-04-03 ENCOUNTER — HEALTH MAINTENANCE LETTER (OUTPATIENT)
Age: 41
End: 2021-04-03

## 2021-09-12 ENCOUNTER — HEALTH MAINTENANCE LETTER (OUTPATIENT)
Age: 41
End: 2021-09-12

## 2022-04-24 ENCOUNTER — HEALTH MAINTENANCE LETTER (OUTPATIENT)
Age: 42
End: 2022-04-24

## 2022-07-15 NOTE — LETTER
August 14, 2020          [address]  [place] [address]      RE:   Cash Cunningham 1980      Dear Colleague,    Thank you for referring your patient, Cash Cunningham, to [facility] at [facility]. Please see a copy of my visit note below.    Surgery Postoperative Note     Still having a very small amount of drainage.  Feels his preoperative symptoms are completely resolved and he has not felt this well for some time.  Still complaining of pinky numbness but does not have any weakness or other complaints.     Abdomen soft without tenderness.  Small opening measuring 3 mm along midline incision.  Probed approxi-1 to 2 mm.  Minimal drainage.  Silver nitrate applied.     A/P  Cash Cunningham is recovering well a laparoscopic assisted bowel resection.  His wound is coming along well.  There was proud flesh that was cauterized with silver nitrate.  I would expect this to stop draining in the very near future.  All other complaints are resolved.  He does complain of some pinky numbness without motor defect.  He states it is not bothering him but I did offer him a referral to neurology if the symptoms persist.       Again, thank you for allowing me to participate in the care of your patient.      Sincerely,      Sher Goodrich MD   None

## 2022-11-19 ENCOUNTER — HEALTH MAINTENANCE LETTER (OUTPATIENT)
Age: 42
End: 2022-11-19

## 2023-06-01 ENCOUNTER — HEALTH MAINTENANCE LETTER (OUTPATIENT)
Age: 43
End: 2023-06-01

## (undated) DEVICE — Device

## (undated) DEVICE — SU VICRYL 3-0 SH CR 8X18" J774

## (undated) DEVICE — SU VICRYL 0 UR-6 27" J603H

## (undated) DEVICE — SPONGE LAP 18X18" X8435

## (undated) DEVICE — EVAC SYSTEM CLEAR FLOW SC082500

## (undated) DEVICE — BLADE KNIFE SURG 11 371111

## (undated) DEVICE — SU PDS II 0 CTX 60" Z990G

## (undated) DEVICE — PREP CHLORAPREP 26ML TINTED ORANGE  260815

## (undated) DEVICE — DRSG GAUZE 4X4" 3033

## (undated) DEVICE — ANTIFOG SOLUTION W/FOAM PAD 31142527

## (undated) DEVICE — ESU GROUND PAD UNIVERSAL W/O CORD

## (undated) DEVICE — ESU ENDO SCISSORS 5MM CVD 5DCS

## (undated) DEVICE — SUCTION IRR STRYKERFLOW II W/TIP 250-070-520

## (undated) DEVICE — BLADE KNIFE SURG 10 371110

## (undated) DEVICE — DRSG STERI STRIP 1/2X4" R1547

## (undated) DEVICE — GLOVE GAMMEX DERMAPRENE ULTRA SZ 8 LF 8516

## (undated) DEVICE — GOWN IMPERVIOUS ZONED XLG 9041

## (undated) DEVICE — ESU HOLDER LAP INST DISP PURPLE LONG 330MM H-PRO-330

## (undated) DEVICE — SYSTEM CLEARIFY VISUALIZATION 21-345

## (undated) DEVICE — STPL LINEAR CUT 75MM TLC75

## (undated) DEVICE — SOL WATER IRRIG 1000ML BOTTLE 2F7114

## (undated) DEVICE — PACK LAP CHOLE SLC15LCFSD

## (undated) DEVICE — LINEN TOWEL PACK X5 5464

## (undated) DEVICE — STPL RELOAD REG TISSUE ECHELON 60 X 3.6MM BLUE GST60B

## (undated) DEVICE — ENDO TROCAR BLUNT TIP KII BALLOON 12X100MM C0R47

## (undated) DEVICE — DRAPE LAP W/POUCH 9430

## (undated) DEVICE — CATH TRAY COUDE 16FR LUBRICATH 2000ML BAG LATEX 901116

## (undated) DEVICE — GLOVE PROTEXIS W/NEU-THERA 7.5  2D73TE75

## (undated) DEVICE — SOL NACL 0.9% IRRIG 1000ML BOTTLE 2F7124

## (undated) DEVICE — SUCTION TIP YANKAUER STR K87

## (undated) DEVICE — ESU PENCIL W/SMOKE EVAC CVPLP2000

## (undated) DEVICE — SOL NACL 0.9% INJ 1000ML BAG 2B1324X

## (undated) DEVICE — SU VICRYL 3-0 SH 27" J316H

## (undated) DEVICE — SU MONOCRYL 4-0 PS-2 18" UND Y496G

## (undated) DEVICE — ENDO TROCAR SLEEVE KII Z-THREADED 05X100MM CTS02

## (undated) DEVICE — STPL RELOAD LINEAR CUT 75MM TCR75

## (undated) DEVICE — ESU LIGASURE LAPAROSCOPIC BLUNT TIP SEALER 5MMX37CM LF1837

## (undated) RX ORDER — FENTANYL CITRATE 50 UG/ML
INJECTION, SOLUTION INTRAMUSCULAR; INTRAVENOUS
Status: DISPENSED
Start: 2019-12-31

## (undated) RX ORDER — ONDANSETRON 2 MG/ML
INJECTION INTRAMUSCULAR; INTRAVENOUS
Status: DISPENSED
Start: 2020-07-19

## (undated) RX ORDER — PROPOFOL 10 MG/ML
INJECTION, EMULSION INTRAVENOUS
Status: DISPENSED
Start: 2020-07-19

## (undated) RX ORDER — DEXAMETHASONE SODIUM PHOSPHATE 4 MG/ML
INJECTION, SOLUTION INTRA-ARTICULAR; INTRALESIONAL; INTRAMUSCULAR; INTRAVENOUS; SOFT TISSUE
Status: DISPENSED
Start: 2020-07-19

## (undated) RX ORDER — FENTANYL CITRATE 50 UG/ML
INJECTION, SOLUTION INTRAMUSCULAR; INTRAVENOUS
Status: DISPENSED
Start: 2020-02-12

## (undated) RX ORDER — KETOROLAC TROMETHAMINE 30 MG/ML
INJECTION, SOLUTION INTRAMUSCULAR; INTRAVENOUS
Status: DISPENSED
Start: 2020-07-19

## (undated) RX ORDER — HYDRALAZINE HYDROCHLORIDE 20 MG/ML
INJECTION INTRAMUSCULAR; INTRAVENOUS
Status: DISPENSED
Start: 2020-07-19

## (undated) RX ORDER — BUPIVACAINE HYDROCHLORIDE AND EPINEPHRINE 2.5; 5 MG/ML; UG/ML
INJECTION, SOLUTION EPIDURAL; INFILTRATION; INTRACAUDAL; PERINEURAL
Status: DISPENSED
Start: 2020-07-19

## (undated) RX ORDER — NEOSTIGMINE METHYLSULFATE 1 MG/ML
VIAL (ML) INJECTION
Status: DISPENSED
Start: 2020-07-19

## (undated) RX ORDER — HYDROMORPHONE HYDROCHLORIDE 1 MG/ML
INJECTION, SOLUTION INTRAMUSCULAR; INTRAVENOUS; SUBCUTANEOUS
Status: DISPENSED
Start: 2020-07-19

## (undated) RX ORDER — GLYCOPYRROLATE 0.2 MG/ML
INJECTION, SOLUTION INTRAMUSCULAR; INTRAVENOUS
Status: DISPENSED
Start: 2020-07-19

## (undated) RX ORDER — FENTANYL CITRATE 50 UG/ML
INJECTION, SOLUTION INTRAMUSCULAR; INTRAVENOUS
Status: DISPENSED
Start: 2020-07-19